# Patient Record
Sex: MALE | Race: WHITE | Employment: FULL TIME | URBAN - METROPOLITAN AREA
[De-identification: names, ages, dates, MRNs, and addresses within clinical notes are randomized per-mention and may not be internally consistent; named-entity substitution may affect disease eponyms.]

---

## 2019-11-05 ENCOUNTER — OFFICE VISIT (OUTPATIENT)
Dept: FAMILY MEDICINE CLINIC | Age: 54
End: 2019-11-05
Payer: COMMERCIAL

## 2019-11-05 VITALS
OXYGEN SATURATION: 98 % | WEIGHT: 232 LBS | SYSTOLIC BLOOD PRESSURE: 130 MMHG | BODY MASS INDEX: 30.75 KG/M2 | DIASTOLIC BLOOD PRESSURE: 70 MMHG | HEART RATE: 76 BPM | HEIGHT: 73 IN | RESPIRATION RATE: 18 BRPM

## 2019-11-05 DIAGNOSIS — Z23 NEED FOR SHINGLES VACCINE: ICD-10-CM

## 2019-11-05 DIAGNOSIS — R53.83 FATIGUE, UNSPECIFIED TYPE: Primary | ICD-10-CM

## 2019-11-05 DIAGNOSIS — R35.0 FREQUENT URINATION: ICD-10-CM

## 2019-11-05 PROCEDURE — 99213 OFFICE O/P EST LOW 20 MIN: CPT | Performed by: FAMILY MEDICINE

## 2019-11-05 PROCEDURE — 81003 URINALYSIS AUTO W/O SCOPE: CPT | Performed by: FAMILY MEDICINE

## 2019-11-05 ASSESSMENT — PATIENT HEALTH QUESTIONNAIRE - PHQ9
2. FEELING DOWN, DEPRESSED OR HOPELESS: 0
SUM OF ALL RESPONSES TO PHQ QUESTIONS 1-9: 0
1. LITTLE INTEREST OR PLEASURE IN DOING THINGS: 0
SUM OF ALL RESPONSES TO PHQ9 QUESTIONS 1 & 2: 0
SUM OF ALL RESPONSES TO PHQ QUESTIONS 1-9: 0

## 2019-11-06 ASSESSMENT — ENCOUNTER SYMPTOMS
SINUS PRESSURE: 0
RHINORRHEA: 0
VOICE CHANGE: 0
EYE REDNESS: 0
COUGH: 0
STRIDOR: 0
EYE PAIN: 0
COLOR CHANGE: 0
SORE THROAT: 0
CHEST TIGHTNESS: 0
ANAL BLEEDING: 0
PHOTOPHOBIA: 0
EYE DISCHARGE: 0
EYE ITCHING: 0
CHOKING: 0
DIARRHEA: 0
RECTAL PAIN: 0
VOMITING: 0
WHEEZING: 0
BLOOD IN STOOL: 0
ABDOMINAL DISTENTION: 0
FACIAL SWELLING: 0
TROUBLE SWALLOWING: 0
APNEA: 0
NAUSEA: 0
BACK PAIN: 0
CONSTIPATION: 0
SHORTNESS OF BREATH: 0
ABDOMINAL PAIN: 0

## 2019-12-20 ENCOUNTER — APPOINTMENT (OUTPATIENT)
Dept: ULTRASOUND IMAGING | Age: 54
End: 2019-12-20
Payer: COMMERCIAL

## 2019-12-20 ENCOUNTER — HOSPITAL ENCOUNTER (EMERGENCY)
Age: 54
Discharge: HOME | End: 2019-12-20
Attending: NURSE PRACTITIONER
Payer: COMMERCIAL

## 2019-12-20 VITALS
HEART RATE: 92 BPM | OXYGEN SATURATION: 98 % | DIASTOLIC BLOOD PRESSURE: 85 MMHG | BODY MASS INDEX: 30.48 KG/M2 | RESPIRATION RATE: 18 BRPM | WEIGHT: 230 LBS | SYSTOLIC BLOOD PRESSURE: 136 MMHG | HEIGHT: 73 IN | TEMPERATURE: 97.9 F

## 2019-12-20 DIAGNOSIS — M79.661 RIGHT CALF PAIN: Primary | ICD-10-CM

## 2019-12-20 PROCEDURE — 99212 OFFICE O/P EST SF 10 MIN: CPT

## 2019-12-20 PROCEDURE — 93971 EXTREMITY STUDY: CPT

## 2019-12-20 ASSESSMENT — PAIN DESCRIPTION - ONSET: ONSET: GRADUAL

## 2019-12-20 ASSESSMENT — PAIN SCALES - GENERAL: PAINLEVEL_OUTOF10: 7

## 2019-12-20 ASSESSMENT — PAIN DESCRIPTION - FREQUENCY: FREQUENCY: CONTINUOUS

## 2019-12-20 ASSESSMENT — PAIN DESCRIPTION - LOCATION: LOCATION: LEG

## 2019-12-20 ASSESSMENT — PAIN DESCRIPTION - ORIENTATION: ORIENTATION: RIGHT

## 2019-12-20 ASSESSMENT — PAIN DESCRIPTION - PAIN TYPE: TYPE: ACUTE PAIN

## 2019-12-20 ASSESSMENT — PAIN DESCRIPTION - PROGRESSION: CLINICAL_PROGRESSION: GRADUALLY WORSENING

## 2019-12-20 ASSESSMENT — PAIN DESCRIPTION - DESCRIPTORS: DESCRIPTORS: ACHING;SORE;RADIATING

## 2020-02-03 ENCOUNTER — HOSPITAL ENCOUNTER (OUTPATIENT)
Age: 55
Discharge: HOME | End: 2020-02-05
Payer: COMMERCIAL

## 2020-02-03 LAB
ALBUMIN SERPL-MCNC: 4.2 G/DL (ref 3.5–5.2)
ALP BLD-CCNC: 73 U/L (ref 40–129)
ALT SERPL-CCNC: 23 U/L (ref 0–40)
ANION GAP SERPL CALCULATED.3IONS-SCNC: 17 MMOL/L (ref 7–16)
AST SERPL-CCNC: 24 U/L (ref 0–39)
BASOPHILS ABSOLUTE: 0.07 E9/L (ref 0–0.2)
BASOPHILS RELATIVE PERCENT: 1.1 % (ref 0–2)
BILIRUB SERPL-MCNC: 1.1 MG/DL (ref 0–1.2)
BILIRUBIN URINE: NEGATIVE
BLOOD, URINE: NEGATIVE
BUN BLDV-MCNC: 16 MG/DL (ref 6–20)
CALCIUM SERPL-MCNC: 9.5 MG/DL (ref 8.6–10.2)
CHLORIDE BLD-SCNC: 108 MMOL/L (ref 98–107)
CHOLESTEROL, TOTAL: 171 MG/DL (ref 0–199)
CLARITY: CLEAR
CO2: 19 MMOL/L (ref 22–29)
COLOR: YELLOW
CREAT SERPL-MCNC: 1 MG/DL (ref 0.7–1.2)
EOSINOPHILS ABSOLUTE: 0.22 E9/L (ref 0.05–0.5)
EOSINOPHILS RELATIVE PERCENT: 3.5 % (ref 0–6)
GFR AFRICAN AMERICAN: >60
GFR NON-AFRICAN AMERICAN: >60 ML/MIN/1.73
GLUCOSE BLD-MCNC: 96 MG/DL (ref 74–99)
GLUCOSE URINE: NEGATIVE MG/DL
HBA1C MFR BLD: 6.2 % (ref 4–5.6)
HCT VFR BLD CALC: 47 % (ref 37–54)
HDLC SERPL-MCNC: 39 MG/DL
HEMOGLOBIN: 14.6 G/DL (ref 12.5–16.5)
IMMATURE GRANULOCYTES #: 0.02 E9/L
IMMATURE GRANULOCYTES %: 0.3 % (ref 0–5)
KETONES, URINE: NEGATIVE MG/DL
LDL CHOLESTEROL CALCULATED: 116 MG/DL (ref 0–99)
LEUKOCYTE ESTERASE, URINE: NEGATIVE
LYMPHOCYTES ABSOLUTE: 1.86 E9/L (ref 1.5–4)
LYMPHOCYTES RELATIVE PERCENT: 29.2 % (ref 20–42)
MCH RBC QN AUTO: 26.7 PG (ref 26–35)
MCHC RBC AUTO-ENTMCNC: 31.1 % (ref 32–34.5)
MCV RBC AUTO: 86.1 FL (ref 80–99.9)
MONOCYTES ABSOLUTE: 0.69 E9/L (ref 0.1–0.95)
MONOCYTES RELATIVE PERCENT: 10.8 % (ref 2–12)
NEUTROPHILS ABSOLUTE: 3.5 E9/L (ref 1.8–7.3)
NEUTROPHILS RELATIVE PERCENT: 55.1 % (ref 43–80)
NITRITE, URINE: NEGATIVE
PDW BLD-RTO: 13.2 FL (ref 11.5–15)
PH UA: 7 (ref 5–9)
PLATELET # BLD: 325 E9/L (ref 130–450)
PMV BLD AUTO: 10.7 FL (ref 7–12)
POTASSIUM SERPL-SCNC: 4.4 MMOL/L (ref 3.5–5)
PROSTATE SPECIFIC ANTIGEN: 4.47 NG/ML (ref 0–4)
PROTEIN UA: NEGATIVE MG/DL
RBC # BLD: 5.46 E12/L (ref 3.8–5.8)
RHEUMATOID FACTOR: <10 IU/ML (ref 0–13)
SODIUM BLD-SCNC: 144 MMOL/L (ref 132–146)
SPECIFIC GRAVITY UA: 1.02 (ref 1–1.03)
T4 FREE: 1.08 NG/DL (ref 0.93–1.7)
TOTAL PROTEIN: 7.4 G/DL (ref 6.4–8.3)
TRIGL SERPL-MCNC: 81 MG/DL (ref 0–149)
TSH SERPL DL<=0.05 MIU/L-ACNC: 1.52 UIU/ML (ref 0.27–4.2)
UROBILINOGEN, URINE: 0.2 E.U./DL
VLDLC SERPL CALC-MCNC: 16 MG/DL
WBC # BLD: 6.4 E9/L (ref 4.5–11.5)

## 2020-02-03 PROCEDURE — 36415 COLL VENOUS BLD VENIPUNCTURE: CPT

## 2020-02-03 PROCEDURE — 86431 RHEUMATOID FACTOR QUANT: CPT

## 2020-02-03 PROCEDURE — 84439 ASSAY OF FREE THYROXINE: CPT

## 2020-02-03 PROCEDURE — 84443 ASSAY THYROID STIM HORMONE: CPT

## 2020-02-03 PROCEDURE — 80053 COMPREHEN METABOLIC PANEL: CPT

## 2020-02-03 PROCEDURE — 86803 HEPATITIS C AB TEST: CPT

## 2020-02-03 PROCEDURE — 81003 URINALYSIS AUTO W/O SCOPE: CPT

## 2020-02-03 PROCEDURE — G0103 PSA SCREENING: HCPCS

## 2020-02-03 PROCEDURE — 86200 CCP ANTIBODY: CPT

## 2020-02-03 PROCEDURE — 80061 LIPID PANEL: CPT

## 2020-02-03 PROCEDURE — 83036 HEMOGLOBIN GLYCOSYLATED A1C: CPT

## 2020-02-03 PROCEDURE — 85025 COMPLETE CBC W/AUTO DIFF WBC: CPT

## 2020-02-04 LAB — HEPATITIS C ANTIBODY INTERPRETATION: NORMAL

## 2020-02-05 ENCOUNTER — OFFICE VISIT (OUTPATIENT)
Dept: FAMILY MEDICINE CLINIC | Age: 55
End: 2020-02-05
Payer: COMMERCIAL

## 2020-02-05 VITALS
SYSTOLIC BLOOD PRESSURE: 120 MMHG | HEART RATE: 60 BPM | BODY MASS INDEX: 30.88 KG/M2 | RESPIRATION RATE: 18 BRPM | WEIGHT: 233 LBS | DIASTOLIC BLOOD PRESSURE: 78 MMHG | HEIGHT: 73 IN | OXYGEN SATURATION: 97 %

## 2020-02-05 PROCEDURE — 99213 OFFICE O/P EST LOW 20 MIN: CPT | Performed by: FAMILY MEDICINE

## 2020-02-05 RX ORDER — FINASTERIDE 5 MG/1
5 TABLET, FILM COATED ORAL DAILY
Qty: 30 TABLET | Refills: 5 | Status: SHIPPED
Start: 2020-02-05 | End: 2020-09-05 | Stop reason: SDUPTHER

## 2020-02-05 RX ORDER — MONTELUKAST SODIUM 10 MG/1
10 TABLET ORAL NIGHTLY
Qty: 30 TABLET | Refills: 5 | Status: SHIPPED
Start: 2020-02-05 | End: 2020-10-13 | Stop reason: SDUPTHER

## 2020-02-05 ASSESSMENT — PATIENT HEALTH QUESTIONNAIRE - PHQ9
SUM OF ALL RESPONSES TO PHQ9 QUESTIONS 1 & 2: 0
1. LITTLE INTEREST OR PLEASURE IN DOING THINGS: 0
2. FEELING DOWN, DEPRESSED OR HOPELESS: 0
SUM OF ALL RESPONSES TO PHQ QUESTIONS 1-9: 0
SUM OF ALL RESPONSES TO PHQ QUESTIONS 1-9: 0

## 2020-02-06 LAB — CCP IGG ANTIBODIES: 6 UNITS (ref 0–19)

## 2020-02-07 ASSESSMENT — ENCOUNTER SYMPTOMS
BACK PAIN: 0
NAUSEA: 0
RHINORRHEA: 1
VOICE CHANGE: 0
SHORTNESS OF BREATH: 0
PHOTOPHOBIA: 0
ABDOMINAL PAIN: 1
CONSTIPATION: 0
COLOR CHANGE: 0
WHEEZING: 0
RECTAL PAIN: 0
CHOKING: 0
ABDOMINAL DISTENTION: 0
ANAL BLEEDING: 0
SORE THROAT: 0
STRIDOR: 0
TROUBLE SWALLOWING: 0
APNEA: 0
VOMITING: 0
CHEST TIGHTNESS: 0
SINUS PRESSURE: 1
EYE ITCHING: 0
EYE REDNESS: 0
COUGH: 1
FACIAL SWELLING: 0
EYE PAIN: 0
EYE DISCHARGE: 0
BLOOD IN STOOL: 0
SINUS PAIN: 1
DIARRHEA: 0

## 2020-02-07 NOTE — PROGRESS NOTES
Occupational History    Not on file   Social Needs    Financial resource strain: Not on file    Food insecurity:     Worry: Not on file     Inability: Not on file    Transportation needs:     Medical: Not on file     Non-medical: Not on file   Tobacco Use    Smoking status: Never Smoker    Smokeless tobacco: Never Used   Substance and Sexual Activity    Alcohol use: No     Comment: beer once a year    Drug use: No    Sexual activity: Not on file   Lifestyle    Physical activity:     Days per week: Not on file     Minutes per session: Not on file    Stress: Not on file   Relationships    Social connections:     Talks on phone: Not on file     Gets together: Not on file     Attends Sikhism service: Not on file     Active member of club or organization: Not on file     Attends meetings of clubs or organizations: Not on file     Relationship status: Not on file    Intimate partner violence:     Fear of current or ex partner: Not on file     Emotionally abused: Not on file     Physically abused: Not on file     Forced sexual activity: Not on file   Other Topics Concern    Not on file   Social History Narrative    Not on file       No family history on file. No current outpatient medications on file prior to visit. No current facility-administered medications on file prior to visit. No Known Allergies    I have reviewed his allergies, medications, problem list, medical,social and family history and have updated as needed in the electronic medical record. Objective:     Physical Exam  Vitals signs and nursing note reviewed. Constitutional:       General: He is not in acute distress. Appearance: He is well-developed. He is not diaphoretic. HENT:      Head: Normocephalic and atraumatic. Right Ear: External ear normal.      Left Ear: External ear normal.      Nose: Nose normal.      Mouth/Throat:      Pharynx: No oropharyngeal exudate. Eyes:      General: No scleral icterus. Right eye: No discharge. Left eye: No discharge. Conjunctiva/sclera: Conjunctivae normal.      Pupils: Pupils are equal, round, and reactive to light. Neck:      Musculoskeletal: Normal range of motion and neck supple. Thyroid: No thyromegaly. Vascular: No JVD. Trachea: No tracheal deviation. Cardiovascular:      Rate and Rhythm: Normal rate and regular rhythm. Heart sounds: Normal heart sounds. No murmur. No friction rub. No gallop. Pulmonary:      Effort: Pulmonary effort is normal. No respiratory distress. Breath sounds: Normal breath sounds. No stridor. No wheezing or rales. Chest:      Chest wall: No tenderness. Abdominal:      General: Bowel sounds are normal. There is no distension. Palpations: Abdomen is soft. There is no mass. Tenderness: There is no abdominal tenderness. There is no guarding or rebound. Genitourinary:     Comments: Deferred by patient  Musculoskeletal: Normal range of motion. General: No tenderness. Lymphadenopathy:      Cervical: No cervical adenopathy. Skin:     General: Skin is warm and dry. Coloration: Skin is not pale. Findings: No erythema or rash. Neurological:      Mental Status: He is alert and oriented to person, place, and time. Cranial Nerves: No cranial nerve deficit. Motor: No abnormal muscle tone. Coordination: Coordination normal.      Deep Tendon Reflexes: Reflexes are normal and symmetric. Reflexes normal.   Psychiatric:         Behavior: Behavior normal.         Thought Content: Thought content normal.         Judgment: Judgment normal.         Assessment / Plan:   Fede Garber was seen today for 3 month follow-up. Diagnoses and all orders for this visit:    Elevated PSA  -     External Referral To Urology  -     finasteride (PROSCAR) 5 MG tablet;  Take 1 tablet by mouth daily    IFG (impaired fasting glucose)  -     Hemoglobin A1C; Future    Seasonal allergic rhinitis

## 2020-05-06 ENCOUNTER — HOSPITAL ENCOUNTER (OUTPATIENT)
Age: 55
Discharge: HOME | End: 2020-05-08
Payer: COMMERCIAL

## 2020-05-06 LAB — HBA1C MFR BLD: 6 % (ref 4–5.6)

## 2020-05-06 PROCEDURE — 36415 COLL VENOUS BLD VENIPUNCTURE: CPT

## 2020-05-06 PROCEDURE — 86003 ALLG SPEC IGE CRUDE XTRC EA: CPT

## 2020-05-06 PROCEDURE — 82785 ASSAY OF IGE: CPT

## 2020-05-06 PROCEDURE — 83036 HEMOGLOBIN GLYCOSYLATED A1C: CPT

## 2020-05-11 LAB
Lab: NORMAL
REPORT: NORMAL
THIS TEST SENT TO: NORMAL

## 2020-07-09 ENCOUNTER — HOSPITAL ENCOUNTER (OUTPATIENT)
Age: 55
Discharge: HOME | End: 2020-07-11
Payer: COMMERCIAL

## 2020-07-09 LAB
CALCIUM SERPL-MCNC: 9.4 MG/DL (ref 8.6–10.2)
PHOSPHORUS: 2.3 MG/DL (ref 2.5–4.5)
URIC ACID, SERUM: 5.2 MG/DL (ref 3.4–7)

## 2020-07-09 PROCEDURE — 84550 ASSAY OF BLOOD/URIC ACID: CPT

## 2020-07-09 PROCEDURE — 84153 ASSAY OF PSA TOTAL: CPT

## 2020-07-09 PROCEDURE — 84100 ASSAY OF PHOSPHORUS: CPT

## 2020-07-09 PROCEDURE — 36415 COLL VENOUS BLD VENIPUNCTURE: CPT

## 2020-07-09 PROCEDURE — 82310 ASSAY OF CALCIUM: CPT

## 2020-07-09 PROCEDURE — G0103 PSA SCREENING: HCPCS

## 2020-07-16 LAB
PROSTATE SPECIFIC ANTIGEN: 4.28 NG/ML (ref 0–4)
PROSTATE SPECIFIC ANTIGEN: ABNORMAL NG/ML (ref 0–4)

## 2020-09-08 RX ORDER — FINASTERIDE 5 MG/1
5 TABLET, FILM COATED ORAL DAILY
Qty: 30 TABLET | Refills: 5 | Status: SHIPPED
Start: 2020-09-08 | End: 2021-03-28 | Stop reason: SDUPTHER

## 2020-09-09 ENCOUNTER — TELEPHONE (OUTPATIENT)
Dept: ADMINISTRATIVE | Age: 55
End: 2020-09-09

## 2020-09-09 NOTE — TELEPHONE ENCOUNTER
Called and spoke with wife and told her that Dr. Eusebio Gallegos advises ER, may be cardiac related. She said she will call her  and tell him. She had additional questions about pt not going to ER if he is no longer having any problems. Informed her that encounter would be sent back to the office. Please have a staff member call pt's wife as we are unable to answer any clinical questions.

## 2020-09-09 NOTE — TELEPHONE ENCOUNTER
Pt's wife called and requested to schedule an appt, no availability this week or next week. Pt is having abdominal discomfort under his sternum x 1 week. Most times it is a dull ache, but sometimes is a strong pain. It is not constant, but he feels pressure. Please contact pt's wife with further recommendations.

## 2020-09-10 NOTE — TELEPHONE ENCOUNTER
Per Dr. Janel Paulino patient has to be evaluated by emergency room and if having no problems now then needs to schedule next available to discuss with Dr. Janel Paulino

## 2020-10-06 ENCOUNTER — HOSPITAL ENCOUNTER (OUTPATIENT)
Age: 55
Discharge: HOME | End: 2020-10-08
Payer: COMMERCIAL

## 2020-10-06 LAB — PROSTATE SPECIFIC ANTIGEN: 3.66 NG/ML (ref 0–4)

## 2020-10-06 PROCEDURE — 84153 ASSAY OF PSA TOTAL: CPT

## 2020-10-06 PROCEDURE — 36415 COLL VENOUS BLD VENIPUNCTURE: CPT

## 2020-10-13 ENCOUNTER — OFFICE VISIT (OUTPATIENT)
Dept: FAMILY MEDICINE CLINIC | Age: 55
End: 2020-10-13
Payer: COMMERCIAL

## 2020-10-13 VITALS
BODY MASS INDEX: 30.22 KG/M2 | SYSTOLIC BLOOD PRESSURE: 126 MMHG | OXYGEN SATURATION: 99 % | WEIGHT: 228 LBS | HEIGHT: 73 IN | DIASTOLIC BLOOD PRESSURE: 80 MMHG | RESPIRATION RATE: 18 BRPM | HEART RATE: 56 BPM

## 2020-10-13 PROCEDURE — 99213 OFFICE O/P EST LOW 20 MIN: CPT | Performed by: FAMILY MEDICINE

## 2020-10-13 PROCEDURE — 90686 IIV4 VACC NO PRSV 0.5 ML IM: CPT | Performed by: FAMILY MEDICINE

## 2020-10-13 PROCEDURE — 90471 IMMUNIZATION ADMIN: CPT | Performed by: FAMILY MEDICINE

## 2020-10-13 RX ORDER — MONTELUKAST SODIUM 10 MG/1
10 TABLET ORAL NIGHTLY
Qty: 30 TABLET | Refills: 5 | Status: SHIPPED
Start: 2020-10-13 | End: 2021-04-22 | Stop reason: SINTOL

## 2020-10-17 ASSESSMENT — ENCOUNTER SYMPTOMS
DIARRHEA: 0
RHINORRHEA: 0
EYE DISCHARGE: 0
WHEEZING: 0
APNEA: 0
STRIDOR: 0
TROUBLE SWALLOWING: 0
COLOR CHANGE: 0
VOICE CHANGE: 0
ABDOMINAL PAIN: 0
VOMITING: 0
RECTAL PAIN: 0
SINUS COMPLAINT: 1
BACK PAIN: 0
EYE REDNESS: 0
CHOKING: 0
CHEST TIGHTNESS: 0
PHOTOPHOBIA: 0
EYE PAIN: 0
SORE THROAT: 0
EYE ITCHING: 0
ABDOMINAL DISTENTION: 0
FACIAL SWELLING: 0
NAUSEA: 0
COUGH: 0
BLOOD IN STOOL: 0
SHORTNESS OF BREATH: 0
SINUS PRESSURE: 0
ANAL BLEEDING: 0
CONSTIPATION: 0

## 2020-10-18 NOTE — PROGRESS NOTES
Valorie Martinez is a 63 y.o. male  . Subjective:      Shoulder Pain    The pain is present in the right shoulder. This is a recurrent problem. The current episode started more than 1 month ago. The problem occurs daily. The problem has been gradually worsening. The quality of the pain is described as aching and burning. The pain is at a severity of 5/10. The pain is moderate. Pertinent negatives include no fever or numbness. He has tried NSAIDS for the symptoms. The treatment provided mild relief. His past medical history is significant for osteoarthritis. Hand Pain    The incident occurred more than 1 week ago (left trigger finger , ring). There was no injury mechanism. The pain is present in the left fingers and left hand. The quality of the pain is described as aching and burning. The pain does not radiate. The pain is at a severity of 2/10. The pain is mild. The pain has been intermittent since the incident. Pertinent negatives include no chest pain or numbness. Nothing aggravates the symptoms. He has tried nothing for the symptoms. The treatment provided no relief. Sinus Problem   This is a recurrent (seasonal allergic rhinitis) problem. The current episode started more than 1 month ago. The problem has been waxing and waning since onset. Pertinent negatives include no chills, congestion, coughing, diaphoresis, ear pain, headaches, neck pain, shortness of breath, sinus pressure, sneezing or sore throat. Past treatments include nothing. The treatment provided no relief. Review of Systems   Constitutional: Negative for activity change, appetite change, chills, diaphoresis, fatigue, fever and unexpected weight change. HENT: Negative for congestion, dental problem, drooling, ear discharge, ear pain, facial swelling, hearing loss, mouth sores, nosebleeds, postnasal drip, rhinorrhea, sinus pressure, sneezing, sore throat, tinnitus, trouble swallowing and voice change.     Eyes: Negative for photophobia, pain, discharge, redness, itching and visual disturbance. Respiratory: Negative for apnea, cough, choking, chest tightness, shortness of breath, wheezing and stridor. Cardiovascular: Negative for chest pain, palpitations and leg swelling. Gastrointestinal: Negative for abdominal distention, abdominal pain, anal bleeding, blood in stool, constipation, diarrhea, nausea, rectal pain and vomiting. Endocrine: Negative for cold intolerance, heat intolerance, polydipsia, polyphagia and polyuria. Genitourinary: Negative for decreased urine volume, difficulty urinating, discharge, dysuria, enuresis, flank pain, frequency, genital sores, hematuria, penile pain, penile swelling, scrotal swelling, testicular pain and urgency. Musculoskeletal: Positive for arthralgias and myalgias. Negative for back pain, gait problem, joint swelling, neck pain and neck stiffness. Skin: Negative for color change, pallor, rash and wound. Allergic/Immunologic: Negative for environmental allergies, food allergies and immunocompromised state. Neurological: Negative for dizziness, tremors, seizures, syncope, facial asymmetry, speech difficulty, weakness, light-headedness, numbness and headaches. Hematological: Negative for adenopathy. Does not bruise/bleed easily. Psychiatric/Behavioral: Negative for agitation, behavioral problems, confusion, decreased concentration, dysphoric mood, hallucinations, self-injury, sleep disturbance and suicidal ideas. The patient is not nervous/anxious and is not hyperactive. No past medical history on file.     Social History     Socioeconomic History    Marital status:      Spouse name: Not on file    Number of children: Not on file    Years of education: Not on file    Highest education level: Not on file   Occupational History    Not on file   Social Needs    Financial resource strain: Not on file    Food insecurity     Worry: Not on file     Inability: Not on file   Annalee Lopez Transportation needs     Medical: Not on file     Non-medical: Not on file   Tobacco Use    Smoking status: Never Smoker    Smokeless tobacco: Never Used   Substance and Sexual Activity    Alcohol use: No     Comment: beer once a year    Drug use: No    Sexual activity: Not on file   Lifestyle    Physical activity     Days per week: Not on file     Minutes per session: Not on file    Stress: Not on file   Relationships    Social connections     Talks on phone: Not on file     Gets together: Not on file     Attends Uatsdin service: Not on file     Active member of club or organization: Not on file     Attends meetings of clubs or organizations: Not on file     Relationship status: Not on file    Intimate partner violence     Fear of current or ex partner: Not on file     Emotionally abused: Not on file     Physically abused: Not on file     Forced sexual activity: Not on file   Other Topics Concern    Not on file   Social History Narrative    Not on file       No family history on file. Current Outpatient Medications on File Prior to Visit   Medication Sig Dispense Refill    finasteride (PROSCAR) 5 MG tablet Take 1 tablet by mouth daily 30 tablet 5     No current facility-administered medications on file prior to visit. No Known Allergies    I have reviewed his allergies, medications, problem list, medical,social and family history and have updated as needed in the electronic medical record. Objective:     Physical Exam  Vitals signs and nursing note reviewed. Constitutional:       General: He is not in acute distress. Appearance: He is well-developed. He is not diaphoretic. HENT:      Head: Normocephalic and atraumatic. Right Ear: External ear normal.      Left Ear: External ear normal.      Nose: Nose normal.      Mouth/Throat:      Pharynx: No oropharyngeal exudate. Eyes:      General: No scleral icterus. Right eye: No discharge. Left eye: No discharge. MICROALBUMIN, UR; Future    Seasonal allergic rhinitis due to pollen  -     montelukast (SINGULAIR) 10 MG tablet; Take 1 tablet by mouth nightly  -     CBC Auto Differential; Future  -     Comprehensive Metabolic Panel; Future  -     TSH without Reflex; Future  -     Hemoglobin A1C; Future  -     Lipid Panel; Future  -     MICROALBUMIN, UR; Future    IFG (impaired fasting glucose)  -     CBC Auto Differential; Future  -     Comprehensive Metabolic Panel; Future  -     TSH without Reflex; Future  -     Hemoglobin A1C; Future  -     Lipid Panel; Future  -     MICROALBUMIN, UR; Future    Mixed hyperlipidemia  -     CBC Auto Differential; Future  -     Comprehensive Metabolic Panel; Future  -     TSH without Reflex; Future  -     Hemoglobin A1C; Future  -     Lipid Panel; Future  -     MICROALBUMIN, UR; Future    Trigger finger, left ring finger  -     Fernanda Mancuso DO, [facility] [place]    Chronic right shoulder pain  -     Nino Davila, DO, [facility], [place]    Need for influenza vaccination  -     INFLUENZA, QUADV, 3 YRS AND OLDER, IM PF, PREFILL SYR OR SDV, 0.5ML (AFLURIA QUADV, PF)        Reviewed healthmaintenance report. Patient is aware of deficiencies and suggested preventative tests.

## 2021-02-12 DIAGNOSIS — M65.342 TRIGGER FINGER, LEFT RING FINGER: Primary | ICD-10-CM

## 2021-02-27 DIAGNOSIS — M72.2 PLANTAR FASCIITIS: Primary | ICD-10-CM

## 2021-03-04 ENCOUNTER — OFFICE VISIT (OUTPATIENT)
Dept: PODIATRY | Age: 56
End: 2021-03-04
Payer: COMMERCIAL

## 2021-03-04 VITALS — BODY MASS INDEX: 30.48 KG/M2 | HEIGHT: 73 IN | WEIGHT: 230 LBS | RESPIRATION RATE: 18 BRPM

## 2021-03-04 DIAGNOSIS — R26.2 DIFFICULTY WALKING: ICD-10-CM

## 2021-03-04 DIAGNOSIS — M79.672 PAIN IN BOTH FEET: ICD-10-CM

## 2021-03-04 DIAGNOSIS — M79.671 PAIN IN BOTH FEET: ICD-10-CM

## 2021-03-04 DIAGNOSIS — M72.2 PLANTAR FASCIITIS: Primary | ICD-10-CM

## 2021-03-04 PROCEDURE — 99243 OFF/OP CNSLTJ NEW/EST LOW 30: CPT | Performed by: PODIATRIST

## 2021-03-04 NOTE — LETTER
[address] [place] [address] [place]   Phone: [phone]  Fax: [phone]    Kayleigh [place] Christy  <[id number]>   1965  3/5/2021      Dear Sonia Fay,    I would like to thank you for the kind referral of Ebenezer Christy. He presented to the office today for evaluation regarding plantar fascial issues to both lower extremities. We did discuss multiple conservative treatment options with patient in detail today for care. We did discuss shoe gear recommendations, appropriate OTC insoles to purchase, and daily stretching and strengthening exercises. We will have continued follow-up until issues are improved. If you should have any questions concerning his visit today, please do not hesitate to contact me.     Sincerely,    Adilia Harrington DPM

## 2021-03-04 NOTE — PROGRESS NOTES
Patient is here for bilateral plantar fasciitis. More so on left. Patient was referred by Jessy Dye DO. Patient got orthotics insoles and slippers. Patient also rolled feet on water bottles. Patient has a split on left great toe. Would like it checked out.      LOV  Jessy Dye DO was 6 months ago         Electronically signed by Carie Vidal LPN on 1/4/8179 at 76:49 AM

## 2021-03-05 PROBLEM — M79.671 PAIN IN BOTH FEET: Status: ACTIVE | Noted: 2021-03-05

## 2021-03-05 PROBLEM — M79.672 PAIN IN BOTH FEET: Status: ACTIVE | Noted: 2021-03-05

## 2021-03-05 PROBLEM — M72.2 PLANTAR FASCIITIS: Status: ACTIVE | Noted: 2021-03-05

## 2021-03-05 PROBLEM — R26.2 DIFFICULTY WALKING: Status: ACTIVE | Noted: 2021-03-05

## 2021-03-05 NOTE — PROGRESS NOTES
3/5/21     Lili Powell    : 1965 Sex: male   Age: 63 y.o. Patient was referred by: Terrance Dobbs DO  Patient's PCP/Provider is:  Terrance Dobbs DO    Subjective:    Patient is seen today for evaluation regarding bilateral heel and arch pain. Chief Complaint   Patient presents with    Other     plantar fasciitis     Nail Problem       HPI: Patient stated that the issues have been going on for several months and have progressively getting worse. He has difficulty upon awakening in the morning and after periods of rest starting to walk comfortably. Patient has tried some store-bought insoles which have not helped his current symptoms. He denies any recent injuries or changes in activities. No other additional abnormalities noted at this time. ROS:  Const: Positives and pertinent negatives as per HPI. Musculo: Denies symptoms other than stated above. Neuro: Denies symptoms other than stated above. Skin: Denies symptoms other than stated above. Current Medications:    Current Outpatient Medications:     montelukast (SINGULAIR) 10 MG tablet, Take 1 tablet by mouth nightly, Disp: 30 tablet, Rfl: 5    finasteride (PROSCAR) 5 MG tablet, Take 1 tablet by mouth daily, Disp: 30 tablet, Rfl: 5    Allergies:  No Known Allergies    Vitals:    21 1127   Resp: 18   Weight: 230 lb (104.3 kg)   Height: 6' 1\" (1.854 m)        No past medical history on file. No family history on file. Past Surgical History:   Procedure Laterality Date    CYST REMOVAL Left     as kid ganglion    HERNIA REPAIR      TONSILLECTOMY       Social History     Tobacco Use    Smoking status: Never Smoker    Smokeless tobacco: Never Used   Substance Use Topics    Alcohol use: No     Comment: beer once a year    Drug use: No           Diagnostic studies:    No results found. Procedures:    None    Exam:  VASCULAR: Pedal pulses palpable bilateral foot.   Capillary fill time brisk digits 1 through 5 bilateral No acute transfusion indication and no active bleeding  Monitor daily CBC and cont folic acid  Conservative transfusion protocol   foot.  NEUROLOGICAL: Epicritic sensations intact and symmetrical.  No paresthesias noted upon percussion tarsal tunnel region bilateral foot. DERMATOLOGICAL: No edema or ecchymotic skin changes present present plantar aspect right heel and arch region. MUSCULOSKELETAL: Tenderness noted to palpation into the plantar heel and arch regions to palpation left greater than right. Mild antalgic gait noted upon evaluation. Plan Per Assessment  Beth Matos was seen today for other and nail problem. Diagnoses and all orders for this visit:    Plantar fasciitis  -     Amb External Referral For Orthotics    Pain in both feet    Difficulty walking        1. New patient consultation and management  2. We did review previous x-ray studies and visit notes prior to patient's appointment today. No acute bone abnormalities noted. 3. We did recommend purchasing OTC insoles which were recommended for patient to purchase. We did discuss shoe gear options for both work and at home to accommodate the insoles and reduce current issues. Appropriate stretching and strengthening exercises were discussed in detail today as well. 4. Patient will be followed up in 1 month's time or sooner if needed for continued evaluation and management. He was advised to call the office with any questions or concerns in the interim. Seen By:    Marlyn Barraza DPM    Electronically signed by Marlyn Barraza DPM on 3/5/2021 at 7:44 AM      This note was created using voice recognition software. The note was reviewed however may contain grammatical errors.

## 2021-03-08 ENCOUNTER — OFFICE VISIT (OUTPATIENT)
Dept: ORTHOPEDIC SURGERY | Age: 56
End: 2021-03-08
Payer: COMMERCIAL

## 2021-03-08 VITALS — WEIGHT: 230 LBS | HEIGHT: 73 IN | BODY MASS INDEX: 30.48 KG/M2

## 2021-03-08 DIAGNOSIS — M25.511 RIGHT SHOULDER PAIN, UNSPECIFIED CHRONICITY: Primary | ICD-10-CM

## 2021-03-08 DIAGNOSIS — M65.342 TRIGGER FINGER, LEFT RING FINGER: ICD-10-CM

## 2021-03-08 DIAGNOSIS — M75.41 SHOULDER IMPINGEMENT, RIGHT: ICD-10-CM

## 2021-03-08 PROCEDURE — 99204 OFFICE O/P NEW MOD 45 MIN: CPT | Performed by: ORTHOPAEDIC SURGERY

## 2021-03-08 NOTE — PROGRESS NOTES
Chief Complaint   Patient presents with    Shoulder Pain     Right shoulder pain from a fall about a year ago.  Hand Pain     Left hand ring triggering for the past year. Panfilo Lopez returns today for follow up of his right shoulder pain. he reports that the pain in the shoulder is worse. He reports an injury when he fell about a year ago. he has not been going to physical therapy. he is also complaining of left hand trigger finger in the ring finger. The patient is right dominant. The patient's pain level is a 5/10. The patient did respond to treatment. No past medical history on file.   Past Surgical History:   Procedure Laterality Date    CYST REMOVAL Left     as kid ganglion    HERNIA REPAIR      TONSILLECTOMY         Current Outpatient Medications:     montelukast (SINGULAIR) 10 MG tablet, Take 1 tablet by mouth nightly, Disp: 30 tablet, Rfl: 5    finasteride (PROSCAR) 5 MG tablet, Take 1 tablet by mouth daily, Disp: 30 tablet, Rfl: 5  No Known Allergies  Social History     Socioeconomic History    Marital status:      Spouse name: Not on file    Number of children: Not on file    Years of education: Not on file    Highest education level: Not on file   Occupational History    Not on file   Social Needs    Financial resource strain: Not on file    Food insecurity     Worry: Not on file     Inability: Not on file    Transportation needs     Medical: Not on file     Non-medical: Not on file   Tobacco Use    Smoking status: Never Smoker    Smokeless tobacco: Never Used   Substance and Sexual Activity    Alcohol use: No     Comment: beer once a year    Drug use: No    Sexual activity: Not on file   Lifestyle    Physical activity     Days per week: Not on file     Minutes per session: Not on file    Stress: Not on file   Relationships    Social connections     Talks on phone: Not on file     Gets together: Not on file     Attends Evangelical service: Not on file     Active member of club or organization: Not on file     Attends meetings of clubs or organizations: Not on file     Relationship status: Not on file    Intimate partner violence     Fear of current or ex partner: Not on file     Emotionally abused: Not on file     Physically abused: Not on file     Forced sexual activity: Not on file   Other Topics Concern    Not on file   Social History Narrative    Not on file     No family history on file. REVIEW OF SYSTEMS:     General/Constitution:  (-)weight loss, (-)fever, (-)chills, (-)weakness. Skin: (-) rash,(-) psoriasis,(-) eczema, (-)skin cancer. Musculoskeletal: (-) fractures,  (-) dislocations,(-) collagen vascular disease, (-) fibromyalgia, (-) multiple sclerosis, (-) muscular dystrophy, (-) RSD,(-) joint pain (-)swelling, (-) joint pain,swelling. Neurologic: (-) epilepsy, (-)seizures,(-) brain tumor,(-) TIA, (-)stroke, (-)headaches, (-)Parkinson disease,(-) memory loss, (-) LOC. Cardiovascular: (-) Chest pain, (-) swelling in legs/feet, (-) SOB, (-) cramping in legs/feet with walking. Respiratory: (-) SOB, (-) Coughing, (-) night sweats. GI: (-) nausea, (-) vomiting, (-) diarrhea, (-) blood in stool, (-) gastric ulcer. Psychiatric: (-) Depression, (-) Anxiety, (-) bipolar disease, (-) Alzheimer's Disease  Allergic/Immunologic: (-) allergies latex, (-) allergies metal, (-) skin sensitivity. Hematlogic: (-) anemia, (-) blood transfusion, (-) DVT/PE, (-) Clotting disorders    SUBJECTIVE:  _Ht 6' 1\" (1.854 m)   Wt 230 lb (104.3 kg)   BMI 30.34 kg/m²  Vital signs are stable. In general, patient is awake, alert and oriented X3, in no apparent distress. Examination of HENT reveals normocephalic, atraumatic. PERRLA/EOMI sclera are white. Conjunctivae are clear. TM's are intact. Pharynx is pink and moist.  Uvula and tongue are midline. Heart: Positive S1 and positive S2 with regular rate and rhythm.   Lungs: Clear to auscultation bilaterally without rales, rhonchi or wheezes. Abdomen: soft, nontender. Positive bowel sounds. No organomegaly. No guarding or rigidity. Constitution:  The patient is alert and oriented x 3, appears to be stated age and in no distress. Ht 6' 1\" (1.854 m)   Wt 230 lb (104.3 kg)   BMI 30.34 kg/m²       Skin:  Upon inspection: the skin appears warm, dry and intact. There is not a previous scar over the affected area. There is not any cellulitis, lymphedema or cutaneous lesions noted in the lower extremities. Upon palpation there is no induration noted. Neurologic:  Gait: normal;  Motor exam of the upper extremities show: The reflexes in biceps/triceps/brachioradialis are equal and symmetric. Sensory exam C5-T1 are normal bilaterally. .        Cardiovascular: The vascular exam is normal and is well perfused to distal extremities. There are 2+ radial pulses bilaterally, and motor and sensation is intact to median, ulnar, and radial, musclocutaneus, and axillary nerve distribution and grossly symmetric bilaterally. There is cap refill noted less than two seconds in all digits. There is not edema of the bilateral upper extremities. There is not varicosities noted in the distal extremities. Lymph:  Upon palpation,  there is no lymphadenopathy noted in bilateral upper extremities. Musculoskeletal:  Gait: normal; examination of the nails and digits reveal no cyanosis or clubbing. Cervical Exam:  On physical exam, Jimmy Archuleta is well-developed, well-nourished, oriented to person, place and time. his gait is normal.  On evaluation of his cervical spine, he has full range of motion of the cervical spine without pain. There is no cervical tenderness to palpation. Shoulder Exam:   On evaluation of his bilaterally upper extremities, his right shoulder has no deformity. There is tenderness upon palpation of the anterior and lateral shoulder. There is not evidence of scapular dyskinesis.   There is not muscle atrophy in shoulder girdle. The range of motion for the Right Shoulder is 140/45/t10 and for the Left shoulder is 15/045/t8. Right shoulder Motor strength is 5/5 in the supraspinatus, 5/5 internal rotation and 5/5 in external rotation, and Left shoulder motor strength 5/5 in supraspinatus, 5/5 in internal rotation, 5/5 in external rotation. Right shoulder:  positive Impingement , positive Robertson ,negative  Speeds,negative  Apprehension ,negative Hurley Load Shift, negative Suly manuver, negative Cross arm test.     Left shoulder:  negative Impingement , negative Robertson ,negative  Speeds,negative  Apprehension ,negative Hurley Load Shift, negative Suly manuver, negative Cross arm test.     Hand exam:  The skin overlying the hand is  intact. There is not evidence of scar, lesion, laceration, or abrasion. The motion in the small joints of the hand are intact with no stiffness or deformity. The ROM in the MCP flexion 90/ extension 0 , PIP flexion 90/ extension 0, DIP flexion 70/ extension 0. There is not rotational deformity. There is no masses or adenopathy in bilateral upper extremities. Radial pulses are 2+ and symmetric bilaterally. Capillary refill is intact and < 2 seconds. Motor strength is 5/5 with flexion and extension of the small finger joints. + locking left ring finger    Right:  Phallens sign(-), Tinnells sign (-), Median nerve compression test (-),  Finklesteins (-), [facility] Grind test (-), [employer](-). Left:    Phallens sign(-), Tinnells sign (-), Median nerve compression test (-),  Finklesteins (-), [facility] Grind test (-), [employer](-). X-ray:  Mild glenohumeral arthritis    MRI:  n/a    Radiographic findings reviewed with patient        Impression:       Encounter Diagnoses   Name Primary?  Right shoulder pain, unspecified chronicity Yes    Shoulder impingement, right     Trigger finger, left ring finger        Plan:  Natural history and expected course discussed. Questions answered. Educational material distributed. Reduction in offending activity. Gentle ROM exercises  We will perform a Left trigger finger release of the ring finger, will call with date. The risks and benefits were reviewed with the patient such as:  DVT, infection,  injuries to blood vessels and nerves, non relief of symptoms, continued pain, worsening of symptoms. At least 25 minutes was spent discussing the diagnosis and treatment options with the patient with at least 50% of the time was spent with decision making and counseling the patient. The patient was counseled at length about the risks of hannah Covid-19 during their perioperative period and any recovery window from their procedure. The patient was made aware that hannah Covid-19  may worsen their prognosis for recovering from their procedure  and lend to a higher morbidity and/or mortality risk. All material risks, benefits, and reasonable alternatives including postponing the procedure were discussed. The patient does wish to proceed with the procedure at this time.     Ok to return to work quickly after surgery , will apply soft dressing

## 2021-03-09 DIAGNOSIS — B35.1 ONYCHOMYCOSIS: Primary | ICD-10-CM

## 2021-03-28 DIAGNOSIS — R97.20 ELEVATED PSA: ICD-10-CM

## 2021-03-29 RX ORDER — FINASTERIDE 5 MG/1
5 TABLET, FILM COATED ORAL DAILY
Qty: 30 TABLET | Refills: 5 | Status: SHIPPED
Start: 2021-03-29 | End: 2021-04-25 | Stop reason: SDUPTHER

## 2021-04-22 ENCOUNTER — OFFICE VISIT (OUTPATIENT)
Dept: FAMILY MEDICINE CLINIC | Age: 56
End: 2021-04-22
Payer: COMMERCIAL

## 2021-04-22 ENCOUNTER — OFFICE VISIT (OUTPATIENT)
Dept: PODIATRY | Age: 56
End: 2021-04-22
Payer: COMMERCIAL

## 2021-04-22 VITALS — BODY MASS INDEX: 31.14 KG/M2 | HEIGHT: 73 IN | WEIGHT: 235 LBS

## 2021-04-22 VITALS
RESPIRATION RATE: 18 BRPM | HEART RATE: 59 BPM | OXYGEN SATURATION: 98 % | BODY MASS INDEX: 31.14 KG/M2 | WEIGHT: 235 LBS | HEIGHT: 73 IN | SYSTOLIC BLOOD PRESSURE: 118 MMHG | DIASTOLIC BLOOD PRESSURE: 80 MMHG

## 2021-04-22 DIAGNOSIS — J30.1 SEASONAL ALLERGIC RHINITIS DUE TO POLLEN: ICD-10-CM

## 2021-04-22 DIAGNOSIS — M79.671 PAIN IN BOTH FEET: ICD-10-CM

## 2021-04-22 DIAGNOSIS — E78.2 MIXED HYPERLIPIDEMIA: ICD-10-CM

## 2021-04-22 DIAGNOSIS — R73.01 IFG (IMPAIRED FASTING GLUCOSE): ICD-10-CM

## 2021-04-22 DIAGNOSIS — R26.2 DIFFICULTY WALKING: ICD-10-CM

## 2021-04-22 DIAGNOSIS — M79.672 PAIN IN BOTH FEET: ICD-10-CM

## 2021-04-22 DIAGNOSIS — R97.20 ELEVATED PSA: ICD-10-CM

## 2021-04-22 DIAGNOSIS — M72.2 PLANTAR FASCIITIS: Primary | ICD-10-CM

## 2021-04-22 DIAGNOSIS — R53.83 FATIGUE, UNSPECIFIED TYPE: ICD-10-CM

## 2021-04-22 DIAGNOSIS — R53.83 FATIGUE, UNSPECIFIED TYPE: Primary | ICD-10-CM

## 2021-04-22 LAB
ALBUMIN SERPL-MCNC: 4.5 G/DL (ref 3.5–5.2)
ALP BLD-CCNC: 67 U/L (ref 40–129)
ALT SERPL-CCNC: 42 U/L (ref 0–40)
ANION GAP SERPL CALCULATED.3IONS-SCNC: 14 MMOL/L (ref 7–16)
AST SERPL-CCNC: 30 U/L (ref 0–39)
BASOPHILS ABSOLUTE: 0.05 E9/L (ref 0–0.2)
BASOPHILS RELATIVE PERCENT: 0.8 % (ref 0–2)
BILIRUB SERPL-MCNC: 1.2 MG/DL (ref 0–1.2)
BUN BLDV-MCNC: 16 MG/DL (ref 6–20)
CALCIUM SERPL-MCNC: 9.7 MG/DL (ref 8.6–10.2)
CHLORIDE BLD-SCNC: 110 MMOL/L (ref 98–107)
CHOLESTEROL, TOTAL: 204 MG/DL (ref 0–199)
CO2: 21 MMOL/L (ref 22–29)
CREAT SERPL-MCNC: 1 MG/DL (ref 0.7–1.2)
EOSINOPHILS ABSOLUTE: 0.17 E9/L (ref 0.05–0.5)
EOSINOPHILS RELATIVE PERCENT: 2.7 % (ref 0–6)
GFR AFRICAN AMERICAN: >60
GFR NON-AFRICAN AMERICAN: >60 ML/MIN/1.73
GLUCOSE BLD-MCNC: 113 MG/DL (ref 74–99)
HBA1C MFR BLD: 6.1 % (ref 4–5.6)
HCT VFR BLD CALC: 49.8 % (ref 37–54)
HDLC SERPL-MCNC: 41 MG/DL
HEMOGLOBIN: 16.2 G/DL (ref 12.5–16.5)
IMMATURE GRANULOCYTES #: 0.04 E9/L
IMMATURE GRANULOCYTES %: 0.6 % (ref 0–5)
LDL CHOLESTEROL CALCULATED: 137 MG/DL (ref 0–99)
LYMPHOCYTES ABSOLUTE: 1.88 E9/L (ref 1.5–4)
LYMPHOCYTES RELATIVE PERCENT: 29.3 % (ref 20–42)
MCH RBC QN AUTO: 27.9 PG (ref 26–35)
MCHC RBC AUTO-ENTMCNC: 32.5 % (ref 32–34.5)
MCV RBC AUTO: 85.7 FL (ref 80–99.9)
MONOCYTES ABSOLUTE: 0.59 E9/L (ref 0.1–0.95)
MONOCYTES RELATIVE PERCENT: 9.2 % (ref 2–12)
NEUTROPHILS ABSOLUTE: 3.68 E9/L (ref 1.8–7.3)
NEUTROPHILS RELATIVE PERCENT: 57.4 % (ref 43–80)
PDW BLD-RTO: 13.5 FL (ref 11.5–15)
PLATELET # BLD: 302 E9/L (ref 130–450)
PMV BLD AUTO: 10.6 FL (ref 7–12)
POTASSIUM SERPL-SCNC: 4.8 MMOL/L (ref 3.5–5)
PROSTATE SPECIFIC ANTIGEN: 3.41 NG/ML (ref 0–4)
RBC # BLD: 5.81 E12/L (ref 3.8–5.8)
SODIUM BLD-SCNC: 145 MMOL/L (ref 132–146)
T4 FREE: 1.12 NG/DL (ref 0.93–1.7)
TOTAL PROTEIN: 7.3 G/DL (ref 6.4–8.3)
TRIGL SERPL-MCNC: 130 MG/DL (ref 0–149)
TSH SERPL DL<=0.05 MIU/L-ACNC: 1.59 UIU/ML (ref 0.27–4.2)
URIC ACID, SERUM: 6.1 MG/DL (ref 3.4–7)
VLDLC SERPL CALC-MCNC: 26 MG/DL
WBC # BLD: 6.4 E9/L (ref 4.5–11.5)

## 2021-04-22 PROCEDURE — 99213 OFFICE O/P EST LOW 20 MIN: CPT | Performed by: FAMILY MEDICINE

## 2021-04-22 PROCEDURE — 99213 OFFICE O/P EST LOW 20 MIN: CPT | Performed by: PODIATRIST

## 2021-04-22 RX ORDER — FLUTICASONE PROPIONATE 50 MCG
2 SPRAY, SUSPENSION (ML) NASAL DAILY
Qty: 1 BOTTLE | Refills: 5 | Status: SHIPPED
Start: 2021-04-22 | End: 2021-10-31 | Stop reason: SDUPTHER

## 2021-04-22 ASSESSMENT — PATIENT HEALTH QUESTIONNAIRE - PHQ9
2. FEELING DOWN, DEPRESSED OR HOPELESS: 0
SUM OF ALL RESPONSES TO PHQ9 QUESTIONS 1 & 2: 0
SUM OF ALL RESPONSES TO PHQ QUESTIONS 1-9: 0

## 2021-04-22 NOTE — PROGRESS NOTES
Patient is here for follow up insoles. Patient states that they are great!  Ragini Vick DO 4/22/2021  Electronically signed by Donald Smith LPN on 9/96/9806 at 6:51 PM

## 2021-04-23 NOTE — PROGRESS NOTES
21     Christine Leon    : 1965   Sex: male    Age: 63 y.o. Patient's PCP/Provider is:  Eugenio Billingsley DO    Subjective:  Patient is seen today for follow-up regarding continued evaluation regarding plantar fascial issues bilateral foot. Stated the OTC insoles have helped out a lot with his previous symptoms. Patient did purchase new shoe gear which have also helped out with his previous issues. He denies any additional issues at this time. Chief Complaint   Patient presents with    Follow-up     insoles       ROS:  Const: Positives and pertinent negatives as per HPI. Musculo: Denies symptoms other than stated above. Neuro: Denies symptoms other than stated above. Skin: Denies symptoms other than stated above. Current Medications:    Current Outpatient Medications:     fluticasone (FLONASE) 50 MCG/ACT nasal spray, 2 sprays by Each Nostril route daily, Disp: 1 Bottle, Rfl: 5    finasteride (PROSCAR) 5 MG tablet, Take 1 tablet by mouth daily, Disp: 30 tablet, Rfl: 5    ciclopirox (PENLAC) 8 % solution, Apply topically daily to affected nails. , Disp: 6 mL, Rfl: 2    Allergies:  No Known Allergies    Vitals:    21 1359   Weight: 235 lb (106.6 kg)   Height: 6' 1\" (1.854 m)       Exam:  Vascular status unchanged. Minimal tenderness noted into the plantar heel and arch regions bilaterally. No other additional abnormalities noted at this time. Increased range of motion noted ankle, subtalar joint, and MTPJ regions bilateral lower extremities. Diagnostic Studies:     No results found. Procedures:    None    Plan Per Assessment  Jaye So was seen today for follow-up. Diagnoses and all orders for this visit:    Plantar fasciitis    Pain in both feet    Difficulty walking      1. Evaluation and management  2. We did discuss continued treatment options with patient in detail today regarding continued use of his OTC insoles and supportive shoe gear.   3. Patient was advised daily stretching and strengthening exercises to prevent reoccurrence of symptoms. 4. Patient will be followed up at a later date if any further Podiatric issues arise. Seen By:    Ana Montes DPM    Electronically signed by Ana Montes DPM on 4/23/2021 at 10:18 AM    This note was created using voice recognition software. The note was reviewed however may contain grammatical errors.

## 2021-04-25 RX ORDER — FINASTERIDE 5 MG/1
5 TABLET, FILM COATED ORAL DAILY
Qty: 30 TABLET | Refills: 5 | Status: SHIPPED
Start: 2021-04-25 | End: 2021-10-31 | Stop reason: SDUPTHER

## 2021-04-25 ASSESSMENT — ENCOUNTER SYMPTOMS
STRIDOR: 0
VOICE CHANGE: 0
ABDOMINAL DISTENTION: 0
VOMITING: 0
COUGH: 0
EYE PAIN: 0
EYE ITCHING: 0
CHOKING: 0
ANAL BLEEDING: 0
COLOR CHANGE: 0
SORE THROAT: 0
BACK PAIN: 0
NAUSEA: 0
APNEA: 0
FACIAL SWELLING: 0
EYE DISCHARGE: 0
ABDOMINAL PAIN: 0
WHEEZING: 0
CHEST TIGHTNESS: 0
RECTAL PAIN: 0
RHINORRHEA: 0
PHOTOPHOBIA: 0
EYE REDNESS: 0
TROUBLE SWALLOWING: 0
BLOOD IN STOOL: 0
SHORTNESS OF BREATH: 0
DIARRHEA: 0
SINUS PRESSURE: 0
CONSTIPATION: 0

## 2021-04-25 NOTE — PROGRESS NOTES
Earl Guevara is a 63 y.o. male  . Subjective:      Benign Prostatic Hypertrophy  This is a new problem. The current episode started more than 1 month ago. The problem has been waxing and waning since onset. Irritative symptoms include frequency. Irritative symptoms do not include urgency. Obstructive symptoms include a weak stream. Associated symptoms include hesitancy. Pertinent negatives include no chills, dysuria, hematuria, nausea or vomiting. AUA score is 0-7. Nothing aggravates the symptoms. Past treatments include nothing. The treatment provided no relief. Review of Systems   Constitutional: Negative for activity change, appetite change, chills, diaphoresis, fatigue, fever and unexpected weight change. HENT: Negative for congestion, dental problem, drooling, ear discharge, ear pain, facial swelling, hearing loss, mouth sores, nosebleeds, postnasal drip, rhinorrhea, sinus pressure, sneezing, sore throat, tinnitus, trouble swallowing and voice change. Eyes: Negative for photophobia, pain, discharge, redness, itching and visual disturbance. Respiratory: Negative for apnea, cough, choking, chest tightness, shortness of breath, wheezing and stridor. Cardiovascular: Negative for chest pain, palpitations and leg swelling. Gastrointestinal: Negative for abdominal distention, abdominal pain, anal bleeding, blood in stool, constipation, diarrhea, nausea, rectal pain and vomiting. Endocrine: Negative for cold intolerance, heat intolerance, polydipsia, polyphagia and polyuria. Genitourinary: Positive for frequency and hesitancy. Negative for decreased urine volume, difficulty urinating, discharge, dysuria, enuresis, flank pain, genital sores, hematuria, penile pain, penile swelling, scrotal swelling, testicular pain and urgency. Musculoskeletal: Negative for arthralgias, back pain, gait problem, joint swelling, myalgias, neck pain and neck stiffness.    Skin: Negative for color change, pallor, rash and wound. Allergic/Immunologic: Negative for environmental allergies, food allergies and immunocompromised state. Neurological: Negative for dizziness, tremors, seizures, syncope, facial asymmetry, speech difficulty, weakness, light-headedness, numbness and headaches. Hematological: Negative for adenopathy. Does not bruise/bleed easily. Psychiatric/Behavioral: Negative for agitation, behavioral problems, confusion, decreased concentration, dysphoric mood, hallucinations, self-injury, sleep disturbance and suicidal ideas. The patient is not nervous/anxious and is not hyperactive. No past medical history on file.     Social History     Socioeconomic History    Marital status:      Spouse name: Not on file    Number of children: Not on file    Years of education: Not on file    Highest education level: Not on file   Occupational History    Not on file   Social Needs    Financial resource strain: Not on file    Food insecurity     Worry: Not on file     Inability: Not on file    Transportation needs     Medical: Not on file     Non-medical: Not on file   Tobacco Use    Smoking status: Never Smoker    Smokeless tobacco: Never Used   Substance and Sexual Activity    Alcohol use: No     Comment: beer once a year    Drug use: No    Sexual activity: Not on file   Lifestyle    Physical activity     Days per week: Not on file     Minutes per session: Not on file    Stress: Not on file   Relationships    Social connections     Talks on phone: Not on file     Gets together: Not on file     Attends Anabaptist service: Not on file     Active member of club or organization: Not on file     Attends meetings of clubs or organizations: Not on file     Relationship status: Not on file    Intimate partner violence     Fear of current or ex partner: Not on file     Emotionally abused: Not on file     Physically abused: Not on file     Forced sexual activity: Not on file   Other Topics Concern  Not on file   Social History Narrative    Not on file       No family history on file. Current Outpatient Medications on File Prior to Visit   Medication Sig Dispense Refill    finasteride (PROSCAR) 5 MG tablet Take 1 tablet by mouth daily 30 tablet 5    ciclopirox (PENLAC) 8 % solution Apply topically daily to affected nails. 6 mL 2     No current facility-administered medications on file prior to visit. No Known Allergies    I have reviewed his allergies, medications, problem list, medical,social and family history and have updated as needed in the electronic medical record. Objective:     Physical Exam  Constitutional:       General: He is not in acute distress. Appearance: Normal appearance. He is normal weight. He is not ill-appearing, toxic-appearing or diaphoretic. HENT:      Head: Normocephalic and atraumatic. Right Ear: Tympanic membrane normal.      Left Ear: Tympanic membrane normal.      Nose: Nose normal.      Mouth/Throat:      Mouth: Mucous membranes are dry. Pharynx: Oropharynx is clear. Eyes:      Extraocular Movements: Extraocular movements intact. Conjunctiva/sclera: Conjunctivae normal.      Pupils: Pupils are equal, round, and reactive to light. Neck:      Musculoskeletal: Normal range of motion and neck supple. Cardiovascular:      Rate and Rhythm: Normal rate and regular rhythm. Pulses: Normal pulses. Heart sounds: Normal heart sounds. Pulmonary:      Effort: Pulmonary effort is normal. No respiratory distress. Breath sounds: Normal breath sounds. No stridor. No wheezing, rhonchi or rales. Chest:      Chest wall: No tenderness. Genitourinary:     Comments: Deferred by patient  Musculoskeletal: Normal range of motion. Skin:     General: Skin is warm and dry. Neurological:      General: No focal deficit present. Mental Status: He is alert and oriented to person, place, and time.    Psychiatric:         Mood and Affect:

## 2021-05-28 DIAGNOSIS — R53.83 FATIGUE, UNSPECIFIED TYPE: ICD-10-CM

## 2021-05-28 DIAGNOSIS — E78.2 MIXED HYPERLIPIDEMIA: ICD-10-CM

## 2021-05-28 DIAGNOSIS — J30.1 SEASONAL ALLERGIC RHINITIS DUE TO POLLEN: ICD-10-CM

## 2021-05-28 DIAGNOSIS — R73.01 IFG (IMPAIRED FASTING GLUCOSE): ICD-10-CM

## 2021-05-28 LAB
ALBUMIN SERPL-MCNC: 4.5 G/DL (ref 3.5–5.2)
ALP BLD-CCNC: 69 U/L (ref 40–129)
ALT SERPL-CCNC: 30 U/L (ref 0–40)
ANION GAP SERPL CALCULATED.3IONS-SCNC: 13 MMOL/L (ref 7–16)
AST SERPL-CCNC: 27 U/L (ref 0–39)
BASOPHILS ABSOLUTE: 0.04 E9/L (ref 0–0.2)
BASOPHILS RELATIVE PERCENT: 0.6 % (ref 0–2)
BILIRUB SERPL-MCNC: 1.4 MG/DL (ref 0–1.2)
BUN BLDV-MCNC: 17 MG/DL (ref 6–20)
CALCIUM SERPL-MCNC: 9.7 MG/DL (ref 8.6–10.2)
CHLORIDE BLD-SCNC: 105 MMOL/L (ref 98–107)
CHOLESTEROL, TOTAL: 166 MG/DL (ref 0–199)
CO2: 24 MMOL/L (ref 22–29)
CREAT SERPL-MCNC: 0.9 MG/DL (ref 0.7–1.2)
EOSINOPHILS ABSOLUTE: 0.18 E9/L (ref 0.05–0.5)
EOSINOPHILS RELATIVE PERCENT: 2.9 % (ref 0–6)
GFR AFRICAN AMERICAN: >60
GFR NON-AFRICAN AMERICAN: >60 ML/MIN/1.73
GLUCOSE BLD-MCNC: 104 MG/DL (ref 74–99)
HBA1C MFR BLD: 6.2 % (ref 4–5.6)
HCT VFR BLD CALC: 49.4 % (ref 37–54)
HDLC SERPL-MCNC: 42 MG/DL
HEMOGLOBIN: 15.6 G/DL (ref 12.5–16.5)
IMMATURE GRANULOCYTES #: 0.02 E9/L
IMMATURE GRANULOCYTES %: 0.3 % (ref 0–5)
LDL CHOLESTEROL CALCULATED: 104 MG/DL (ref 0–99)
LYMPHOCYTES ABSOLUTE: 2.14 E9/L (ref 1.5–4)
LYMPHOCYTES RELATIVE PERCENT: 34.3 % (ref 20–42)
MCH RBC QN AUTO: 28 PG (ref 26–35)
MCHC RBC AUTO-ENTMCNC: 31.6 % (ref 32–34.5)
MCV RBC AUTO: 88.5 FL (ref 80–99.9)
MICROALBUMIN UR-MCNC: 20 MG/L
MONOCYTES ABSOLUTE: 0.53 E9/L (ref 0.1–0.95)
MONOCYTES RELATIVE PERCENT: 8.5 % (ref 2–12)
NEUTROPHILS ABSOLUTE: 3.33 E9/L (ref 1.8–7.3)
NEUTROPHILS RELATIVE PERCENT: 53.4 % (ref 43–80)
PDW BLD-RTO: 13.4 FL (ref 11.5–15)
PLATELET # BLD: 282 E9/L (ref 130–450)
PMV BLD AUTO: 10.9 FL (ref 7–12)
POTASSIUM SERPL-SCNC: 4.7 MMOL/L (ref 3.5–5)
RBC # BLD: 5.58 E12/L (ref 3.8–5.8)
SODIUM BLD-SCNC: 142 MMOL/L (ref 132–146)
TOTAL PROTEIN: 7.3 G/DL (ref 6.4–8.3)
TRIGL SERPL-MCNC: 102 MG/DL (ref 0–149)
TSH SERPL DL<=0.05 MIU/L-ACNC: 1.48 UIU/ML (ref 0.27–4.2)
VLDLC SERPL CALC-MCNC: 20 MG/DL
WBC # BLD: 6.2 E9/L (ref 4.5–11.5)

## 2021-06-03 ENCOUNTER — OFFICE VISIT (OUTPATIENT)
Dept: PODIATRY | Age: 56
End: 2021-06-03
Payer: COMMERCIAL

## 2021-06-03 DIAGNOSIS — M72.2 PLANTAR FASCIITIS: Primary | ICD-10-CM

## 2021-06-03 DIAGNOSIS — R26.2 DIFFICULTY WALKING: ICD-10-CM

## 2021-06-03 PROBLEM — M79.672 PAIN IN BOTH FEET: Status: RESOLVED | Noted: 2021-03-05 | Resolved: 2021-06-03

## 2021-06-03 PROBLEM — M79.671 PAIN IN BOTH FEET: Status: RESOLVED | Noted: 2021-03-05 | Resolved: 2021-06-03

## 2021-06-03 PROCEDURE — 99213 OFFICE O/P EST LOW 20 MIN: CPT | Performed by: PODIATRIST

## 2021-06-03 NOTE — PROGRESS NOTES
Patient is in today for 6 week follow up. Patient says his feet are doing much better and has been wearing his inserts and better shoes when walking long distances. Patient also has been using the Penlac for the split toenail.  pcp is Eugenio Billingsley DO  Last ov 4/22/21

## 2021-06-03 NOTE — PROGRESS NOTES
6/3/21     Tita Odor    : 1965   Sex: male    Age: 63 y.o. Patient's PCP/Provider is:  Suresh Patel DO    Subjective:  Patient is seen today for follow-up regarding continued evaluation regarding plantar fascial issues to both feet. Patient also presents to discuss treatment regarding a fungal nail issue to his left great toe. Patient is doing much better at this time with use of the OTC insoles recommended and shoe gear recommendations. Patient has noticed improvement in the left great toenail as well with current use of the topical nail lacquer. No other additional abnormalities noted at this time. Chief Complaint   Patient presents with    Foot Pain     bilateral     Nail Problem     left great toenail        ROS:  Const: Positives and pertinent negatives as per HPI. Musculo: Denies symptoms other than stated above. Neuro: Denies symptoms other than stated above. Skin: Denies symptoms other than stated above. Current Medications:    Current Outpatient Medications:     finasteride (PROSCAR) 5 MG tablet, Take 1 tablet by mouth daily, Disp: 30 tablet, Rfl: 5    fluticasone (FLONASE) 50 MCG/ACT nasal spray, 2 sprays by Each Nostril route daily, Disp: 1 Bottle, Rfl: 5    ciclopirox (PENLAC) 8 % solution, Apply topically daily to affected nails. , Disp: 6 mL, Rfl: 2    Allergies:  No Known Allergies    There were no vitals filed for this visit. Exam:  Neurovascular status unchanged. Increased range of motion noted bilateral ankle and subtalar joints. No paresthesias noted upon percussion tarsal tunnel regions bilateral foot. No edema or ecchymotic skin changes present into the plantar heel and arch regions bilaterally. Left great toe digital nail is improved to the nail bed region. No ingrown nail issues noted. No signs of infection noted left great toe. Diagnostic Studies:     No results found.       Procedures:    None    Plan Per Assessment  Halima Salinas was seen today for foot pain and nail problem. Diagnoses and all orders for this visit:    Plantar fasciitis    Difficulty walking      1. Evaluation and management  2. We did discuss continued treatment options for patient in detail today. Patient was to continue with his current OTC insoles and new shoe gear with all ambulatory activities. 3. We did discuss additional nail care techniques to prevent reoccurrence. 4. Patient will be followed up at a later date if any further Podiatric issues arise. Seen By:    Matt Hadley DPM    Electronically signed by Matt Hadley DPM on 6/3/2021 at 1:45 PM    This note was created using voice recognition software. The note was reviewed however may contain grammatical errors.

## 2021-06-04 LAB — PROSTATE SPECIFIC ANTIGEN: 5.9 NG/ML (ref 0–4)

## 2021-08-11 DIAGNOSIS — E78.2 MIXED HYPERLIPIDEMIA: ICD-10-CM

## 2021-08-11 DIAGNOSIS — R73.01 IFG (IMPAIRED FASTING GLUCOSE): ICD-10-CM

## 2021-08-11 DIAGNOSIS — R97.20 PSA ELEVATION: Primary | ICD-10-CM

## 2021-09-17 LAB
ALBUMIN SERPL-MCNC: 4.3 G/DL (ref 3.5–5.2)
ALP BLD-CCNC: 61 U/L (ref 40–129)
ALT SERPL-CCNC: 30 U/L (ref 0–40)
ANION GAP SERPL CALCULATED.3IONS-SCNC: 14 MMOL/L (ref 7–16)
AST SERPL-CCNC: 29 U/L (ref 0–39)
BASOPHILS ABSOLUTE: 0.05 E9/L (ref 0–0.2)
BASOPHILS RELATIVE PERCENT: 0.9 % (ref 0–2)
BILIRUB SERPL-MCNC: 2 MG/DL (ref 0–1.2)
BUN BLDV-MCNC: 16 MG/DL (ref 6–20)
CALCIUM SERPL-MCNC: 9.3 MG/DL (ref 8.6–10.2)
CHLORIDE BLD-SCNC: 103 MMOL/L (ref 98–107)
CHOLESTEROL, TOTAL: 189 MG/DL (ref 0–199)
CO2: 22 MMOL/L (ref 22–29)
CREAT SERPL-MCNC: 0.9 MG/DL (ref 0.7–1.2)
EOSINOPHILS ABSOLUTE: 0.21 E9/L (ref 0.05–0.5)
EOSINOPHILS RELATIVE PERCENT: 3.6 % (ref 0–6)
GFR AFRICAN AMERICAN: >60
GFR NON-AFRICAN AMERICAN: >60 ML/MIN/1.73
GLUCOSE BLD-MCNC: 102 MG/DL (ref 74–99)
HBA1C MFR BLD: 5.9 % (ref 4–5.6)
HCT VFR BLD CALC: 45 % (ref 37–54)
HDLC SERPL-MCNC: 41 MG/DL
HEMOGLOBIN: 14.8 G/DL (ref 12.5–16.5)
IMMATURE GRANULOCYTES #: 0.03 E9/L
IMMATURE GRANULOCYTES %: 0.5 % (ref 0–5)
LDL CHOLESTEROL CALCULATED: 131 MG/DL (ref 0–99)
LYMPHOCYTES ABSOLUTE: 1.95 E9/L (ref 1.5–4)
LYMPHOCYTES RELATIVE PERCENT: 33.4 % (ref 20–42)
MCH RBC QN AUTO: 28.2 PG (ref 26–35)
MCHC RBC AUTO-ENTMCNC: 32.9 % (ref 32–34.5)
MCV RBC AUTO: 85.7 FL (ref 80–99.9)
MONOCYTES ABSOLUTE: 0.58 E9/L (ref 0.1–0.95)
MONOCYTES RELATIVE PERCENT: 9.9 % (ref 2–12)
NEUTROPHILS ABSOLUTE: 3.02 E9/L (ref 1.8–7.3)
NEUTROPHILS RELATIVE PERCENT: 51.7 % (ref 43–80)
PDW BLD-RTO: 13.8 FL (ref 11.5–15)
PLATELET # BLD: 278 E9/L (ref 130–450)
PMV BLD AUTO: 10.6 FL (ref 7–12)
POTASSIUM SERPL-SCNC: 4.5 MMOL/L (ref 3.5–5)
PROSTATE SPECIFIC ANTIGEN: 3.79 NG/ML (ref 0–4)
RBC # BLD: 5.25 E12/L (ref 3.8–5.8)
SODIUM BLD-SCNC: 139 MMOL/L (ref 132–146)
TOTAL PROTEIN: 6.8 G/DL (ref 6.4–8.3)
TRIGL SERPL-MCNC: 84 MG/DL (ref 0–149)
TSH SERPL DL<=0.05 MIU/L-ACNC: 1.56 UIU/ML (ref 0.27–4.2)
VLDLC SERPL CALC-MCNC: 17 MG/DL
WBC # BLD: 5.8 E9/L (ref 4.5–11.5)

## 2021-10-01 ENCOUNTER — HOSPITAL ENCOUNTER (EMERGENCY)
Age: 56
Discharge: HOME | DRG: 871 | End: 2021-10-01
Payer: COMMERCIAL

## 2021-10-01 ENCOUNTER — APPOINTMENT (OUTPATIENT)
Dept: GENERAL RADIOLOGY | Age: 56
DRG: 871 | End: 2021-10-01
Payer: COMMERCIAL

## 2021-10-01 VITALS
OXYGEN SATURATION: 97 % | DIASTOLIC BLOOD PRESSURE: 59 MMHG | SYSTOLIC BLOOD PRESSURE: 100 MMHG | BODY MASS INDEX: 28.89 KG/M2 | HEART RATE: 82 BPM | HEIGHT: 73 IN | RESPIRATION RATE: 16 BRPM | TEMPERATURE: 99.2 F | WEIGHT: 218 LBS

## 2021-10-01 DIAGNOSIS — J18.9 PNEUMONIA DUE TO INFECTIOUS ORGANISM, UNSPECIFIED LATERALITY, UNSPECIFIED PART OF LUNG: Primary | ICD-10-CM

## 2021-10-01 LAB
ALBUMIN SERPL-MCNC: 4.1 G/DL (ref 3.5–5.2)
ALP BLD-CCNC: 54 U/L (ref 40–129)
ALT SERPL-CCNC: 36 U/L (ref 0–40)
ANION GAP SERPL CALCULATED.3IONS-SCNC: 10 MMOL/L (ref 7–16)
AST SERPL-CCNC: 55 U/L (ref 0–39)
BACTERIA: ABNORMAL /HPF
BASOPHILS ABSOLUTE: 0.02 E9/L (ref 0–0.2)
BASOPHILS RELATIVE PERCENT: 0.3 % (ref 0–2)
BILIRUB SERPL-MCNC: 0.7 MG/DL (ref 0–1.2)
BILIRUBIN URINE: NEGATIVE
BLOOD, URINE: ABNORMAL
BUN BLDV-MCNC: 15 MG/DL (ref 6–20)
CALCIUM SERPL-MCNC: 8.6 MG/DL (ref 8.6–10.2)
CHLORIDE BLD-SCNC: 102 MMOL/L (ref 98–107)
CLARITY: CLEAR
CO2: 27 MMOL/L (ref 22–29)
COLOR: YELLOW
CREAT SERPL-MCNC: 1 MG/DL (ref 0.7–1.2)
EOSINOPHILS ABSOLUTE: 0 E9/L (ref 0.05–0.5)
EOSINOPHILS RELATIVE PERCENT: 0 % (ref 0–6)
GFR AFRICAN AMERICAN: >60
GFR NON-AFRICAN AMERICAN: >60 ML/MIN/1.73
GLUCOSE BLD-MCNC: 122 MG/DL (ref 74–99)
GLUCOSE URINE: NEGATIVE MG/DL
HCT VFR BLD CALC: 49.5 % (ref 37–54)
HEMOGLOBIN: 16 G/DL (ref 12.5–16.5)
IMMATURE GRANULOCYTES #: 0.02 E9/L
IMMATURE GRANULOCYTES %: 0.3 % (ref 0–5)
KETONES, URINE: NEGATIVE MG/DL
LACTIC ACID: 1.7 MMOL/L (ref 0.5–2.2)
LACTIC ACID: 2.8 MMOL/L (ref 0.5–2.2)
LEUKOCYTE ESTERASE, URINE: NEGATIVE
LIPASE: 39 U/L (ref 13–60)
LYMPHOCYTES ABSOLUTE: 2.39 E9/L (ref 1.5–4)
LYMPHOCYTES RELATIVE PERCENT: 36.2 % (ref 20–42)
MCH RBC QN AUTO: 28.2 PG (ref 26–35)
MCHC RBC AUTO-ENTMCNC: 32.3 % (ref 32–34.5)
MCV RBC AUTO: 87.3 FL (ref 80–99.9)
MONOCYTES ABSOLUTE: 0.41 E9/L (ref 0.1–0.95)
MONOCYTES RELATIVE PERCENT: 6.2 % (ref 2–12)
NEUTROPHILS ABSOLUTE: 3.77 E9/L (ref 1.8–7.3)
NEUTROPHILS RELATIVE PERCENT: 57 % (ref 43–80)
NITRITE, URINE: NEGATIVE
PDW BLD-RTO: 13.9 FL (ref 11.5–15)
PH UA: 5 (ref 5–9)
PLATELET # BLD: 194 E9/L (ref 130–450)
PMV BLD AUTO: 10.1 FL (ref 7–12)
POTASSIUM REFLEX MAGNESIUM: 4.5 MMOL/L (ref 3.5–5)
PROTEIN UA: 100 MG/DL
RBC # BLD: 5.67 E12/L (ref 3.8–5.8)
RBC UA: ABNORMAL /HPF (ref 0–2)
SODIUM BLD-SCNC: 139 MMOL/L (ref 132–146)
SPECIFIC GRAVITY UA: >=1.03 (ref 1–1.03)
TOTAL PROTEIN: 7.1 G/DL (ref 6.4–8.3)
UROBILINOGEN, URINE: 0.2 E.U./DL
WBC # BLD: 6.6 E9/L (ref 4.5–11.5)
WBC UA: ABNORMAL /HPF (ref 0–5)

## 2021-10-01 PROCEDURE — 71046 X-RAY EXAM CHEST 2 VIEWS: CPT

## 2021-10-01 PROCEDURE — 85025 COMPLETE CBC W/AUTO DIFF WBC: CPT

## 2021-10-01 PROCEDURE — 2580000003 HC RX 258: Performed by: NURSE PRACTITIONER

## 2021-10-01 PROCEDURE — 99284 EMERGENCY DEPT VISIT MOD MDM: CPT

## 2021-10-01 PROCEDURE — 6360000002 HC RX W HCPCS: Performed by: NURSE PRACTITIONER

## 2021-10-01 PROCEDURE — 96374 THER/PROPH/DIAG INJ IV PUSH: CPT

## 2021-10-01 PROCEDURE — 83605 ASSAY OF LACTIC ACID: CPT

## 2021-10-01 PROCEDURE — 81001 URINALYSIS AUTO W/SCOPE: CPT

## 2021-10-01 PROCEDURE — 80053 COMPREHEN METABOLIC PANEL: CPT

## 2021-10-01 PROCEDURE — 94664 DEMO&/EVAL PT USE INHALER: CPT

## 2021-10-01 PROCEDURE — 83690 ASSAY OF LIPASE: CPT

## 2021-10-01 PROCEDURE — 6370000000 HC RX 637 (ALT 250 FOR IP): Performed by: NURSE PRACTITIONER

## 2021-10-01 RX ORDER — 0.9 % SODIUM CHLORIDE 0.9 %
1000 INTRAVENOUS SOLUTION INTRAVENOUS ONCE
Status: COMPLETED | OUTPATIENT
Start: 2021-10-01 | End: 2021-10-01

## 2021-10-01 RX ORDER — KETOROLAC TROMETHAMINE 30 MG/ML
30 INJECTION, SOLUTION INTRAMUSCULAR; INTRAVENOUS ONCE
Status: COMPLETED | OUTPATIENT
Start: 2021-10-01 | End: 2021-10-01

## 2021-10-01 RX ORDER — BENZONATATE 100 MG/1
100 CAPSULE ORAL 2 TIMES DAILY PRN
Qty: 10 CAPSULE | Refills: 0 | Status: SHIPPED | OUTPATIENT
Start: 2021-10-01 | End: 2021-10-06

## 2021-10-01 RX ORDER — DOXYCYCLINE HYCLATE 100 MG
100 TABLET ORAL 2 TIMES DAILY
Qty: 20 TABLET | Refills: 0 | Status: ON HOLD | OUTPATIENT
Start: 2021-10-01 | End: 2021-10-06 | Stop reason: HOSPADM

## 2021-10-01 RX ORDER — IPRATROPIUM BROMIDE AND ALBUTEROL SULFATE 2.5; .5 MG/3ML; MG/3ML
1 SOLUTION RESPIRATORY (INHALATION) ONCE
Status: COMPLETED | OUTPATIENT
Start: 2021-10-01 | End: 2021-10-01

## 2021-10-01 RX ADMIN — IPRATROPIUM BROMIDE AND ALBUTEROL SULFATE 1 AMPULE: .5; 3 SOLUTION RESPIRATORY (INHALATION) at 16:03

## 2021-10-01 RX ADMIN — KETOROLAC TROMETHAMINE 30 MG: 30 INJECTION, SOLUTION INTRAMUSCULAR at 16:42

## 2021-10-01 RX ADMIN — SODIUM CHLORIDE 1000 ML: 9 INJECTION, SOLUTION INTRAVENOUS at 16:43

## 2021-10-01 RX ADMIN — SODIUM CHLORIDE 1000 ML: 9 INJECTION, SOLUTION INTRAVENOUS at 17:32

## 2021-10-01 ASSESSMENT — PAIN SCALES - GENERAL: PAINLEVEL_OUTOF10: 8

## 2021-10-01 NOTE — ED PROVIDER NOTES
[facility]     Department of Emergency Medicine   ED  Provider Note  Admit Date/RoomTime: 10/1/2021  3:28 PM  ED Room: DES/DES      10/1/21  3:56 PM EDT      HPI: Carlos Massey is a 61 y.o. male with a past medical history of  has no past medical history on file. presenting with complaints of a cough, diarrhea, vomiting rib cage pain anteriorly, and decreased urine output. Patient reports his symptoms have been going on for about the last 3 days. Patient reports about 10 episodes of diarrhea within the last 2 days. He states that he had one episode of vomiting. Patient reports that he is drinking fluids somewhat adequately and able to eat solid foods. The patient states that these symptoms began gradually. The history is obtained from the patient. Patient does complain of a mild cough associated with it that is nonproductive. Patient denies excessive fatigue or sleeping greater than 18 hours a day. Patient denies exposure to mononucleosis. The patient denies any abdominal pain, nausea, left upper quadrant fullness, or early satiety. The patient also denies difficulty breathing, hemoptysis, neck pain/stiffness, or blurry vision, fever, chills, shortness of breath or chest pain. Denies ageusia or anosmia. Denies exposure to any sick contacts. Denies any other complaints today. Patient states that he was tested for COVID-19 at a pharmacy just prior to arrival however his results would not be ready for 2 to 3 days. Patient states he has tried over-the-counter cough syrup which has not relieved his symptoms. Patient has no other complaints currently. Review of Systems:   Pertinent positives and negatives are stated within HPI, all other systems reviewed and are negative.           --------------------------------------------- PAST HISTORY ---------------------------------------------  Past Medical History:  has no past medical history on file.     Past Surgical History:  has a past surgical history that includes hernia repair; Tonsillectomy; and cyst removal (Left). Social History:  reports that he has never smoked. He has never used smokeless tobacco. He reports that he does not drink alcohol and does not use drugs. Family History: family history is not on file. The patients home medications have been reviewed. Allergies: Patient has no known allergies. Physical exam:  Constitutional: Vital signs are reviewed the patient is comfortable. The patient is alert and oriented and conversant. Pleasant & cooperative. Well appearing & nontoxic. Speech clear. Head: The head is atraumatic and normocephalic. Face symmetrical. No audible nasal congestion. Eyes: No discharge or tearing from the eyes. The sclera are normal.  Ears: TM pearly gray bilaterally demonstrate no erythema, no bulging and no evidence of infection. Ear canal without cerumen. Mouth: Mucus membranes moist without ulcerations. No tongue/lip swelling. The oropharynx without erythema. There is no tonsillar enlargement nor is there any exudate present. No uvular deviation or edema. No tonsillary asymmetry. Floor of the mouth soft, no trismus, handling secretions. Neck: Normal range of motion is achieved in the neck. There is no JVD present. No meningeal signs are present. No cervical adenopathy. Respiratory: The chest is nontender. Lungs with the wheezing to right lobe. No rales or rhonchi. There is no respiratory distress. Respirations easy and unlabored. No audible wheezing. No stridor. Audible dry cough. Cardiovascular: S1S2, RRR, without murmurs, rubs, clicks or gallops. Abdominal exam: The abdomen is non tender without evidence of peritoneal signs. Specific attention to the left upper quadrant with palpation of the spleen demonstrates no organomegaly or tenderness. Non distended. BS x 4 quadrants. No palpable masses. Musculoskeletal: Moves all extremities x 4. Warm and well perfused. No joint swelling.   Skin: warm and dry, without rash. No lesions or open wounds. No cyanosis, jaundice or ecchymosis noted.   Neurologic: GCS 15, no tremor, no focal deficits  Psych: Normal Affect  -------------------------------------------------- RESULTS -------------------------------------------------    LABS:  Results for orders placed or performed during the hospital encounter of 10/01/21   CBC Auto Differential   Result Value Ref Range    WBC 6.6 4.5 - 11.5 E9/L    RBC 5.67 3.80 - 5.80 E12/L    Hemoglobin 16.0 12.5 - 16.5 g/dL    Hematocrit 49.5 37.0 - 54.0 %    MCV 87.3 80.0 - 99.9 fL    MCH 28.2 26.0 - 35.0 pg    MCHC 32.3 32.0 - 34.5 %    RDW 13.9 11.5 - 15.0 fL    Platelets 974 628 - 801 E9/L    MPV 10.1 7.0 - 12.0 fL    Neutrophils % 57.0 43.0 - 80.0 %    Immature Granulocytes % 0.3 0.0 - 5.0 %    Lymphocytes % 36.2 20.0 - 42.0 %    Monocytes % 6.2 2.0 - 12.0 %    Eosinophils % 0.0 0.0 - 6.0 %    Basophils % 0.3 0.0 - 2.0 %    Neutrophils Absolute 3.77 1.80 - 7.30 E9/L    Immature Granulocytes # 0.02 E9/L    Lymphocytes Absolute 2.39 1.50 - 4.00 E9/L    Monocytes Absolute 0.41 0.10 - 0.95 E9/L    Eosinophils Absolute 0.00 (L) 0.05 - 0.50 E9/L    Basophils Absolute 0.02 0.00 - 0.20 E9/L   Comprehensive Metabolic Panel w/ Reflex to MG   Result Value Ref Range    Sodium 139 132 - 146 mmol/L    Potassium reflex Magnesium 4.5 3.5 - 5.0 mmol/L    Chloride 102 98 - 107 mmol/L    CO2 27 22 - 29 mmol/L    Anion Gap 10 7 - 16 mmol/L    Glucose 122 (H) 74 - 99 mg/dL    BUN 15 6 - 20 mg/dL    CREATININE 1.0 0.7 - 1.2 mg/dL    GFR Non-African American >60 >=60 mL/min/1.73    GFR African American >60     Calcium 8.6 8.6 - 10.2 mg/dL    Total Protein 7.1 6.4 - 8.3 g/dL    Albumin 4.1 3.5 - 5.2 g/dL    Total Bilirubin 0.7 0.0 - 1.2 mg/dL    Alkaline Phosphatase 54 40 - 129 U/L    ALT 36 0 - 40 U/L    AST 55 (H) 0 - 39 U/L   Lipase   Result Value Ref Range    Lipase 39 13 - 60 U/L   Urinalysis, reflex to microscopic   Result Value Ref Range    Color, UA Yellow Straw/Yellow    Clarity, UA Clear Clear    Glucose, Ur Negative Negative mg/dL    Bilirubin Urine Negative Negative    Ketones, Urine Negative Negative mg/dL    Specific Gravity, UA >=1.030 1.005 - 1.030    Blood, Urine LARGE (A) Negative    pH, UA 5.0 5.0 - 9.0    Protein,  (A) Negative mg/dL    Urobilinogen, Urine 0.2 <2.0 E.U./dL    Nitrite, Urine Negative Negative    Leukocyte Esterase, Urine Negative Negative   Lactic Acid, Plasma   Result Value Ref Range    Lactic Acid 2.8 (H) 0.5 - 2.2 mmol/L   Microscopic Urinalysis   Result Value Ref Range    WBC, UA NONE 0 - 5 /HPF    RBC, UA NONE 0 - 2 /HPF    Bacteria, UA RARE (A) None Seen /HPF   LACTIC ACID, PLASMA   Result Value Ref Range    Lactic Acid 1.7 0.5 - 2.2 mmol/L       RADIOLOGY:  Interpreted by Radiologist.  XR CHEST (2 VW)   Final Result   Findings consistent with multifocal pneumonia.                   ------------------------- NURSING NOTES AND VITALS REVIEWED ---------------------------   The nursing notes within the ED encounter and vital signs as below have been reviewed. BP (!) 100/59   Pulse 82   Temp 99.2 °F (37.3 °C)   Resp 16   Ht 6' 1\" (1.854 m)   Wt 218 lb (98.9 kg)   SpO2 97%   BMI 28.76 kg/m²   Oxygen Saturation Interpretation: Normal    The patients available past medical records and past encounters were reviewed.         ------------------------------ ED COURSE/MEDICAL DECISION MAKING----------------------  Medications   0.9 % sodium chloride bolus (0 mLs IntraVENous Stopped 10/1/21 1732)   ketorolac (TORADOL) injection 30 mg (30 mg IntraVENous Given 10/1/21 1642)   ipratropium-albuterol (DUONEB) nebulizer solution 1 ampule (1 ampule Inhalation Given 10/1/21 1603)   0.9 % sodium chloride bolus (0 mLs IntraVENous Stopped 10/1/21 1816)       Ed Course:  Lactic elevated at 2.8, will give an additional liter of IV normal saline and repeat lactic acid  Repeat lactic 1.7  Patient feeling better, wants to go home      Medical Decision Making: Patient presenting to the emergency department today for cough, diarrhea, and possible dehydration. Patient is well appearing, without hypoxia, and appears to be non toxic. Physical examination within normal limits. XRay obtained which reveals multi focal pneumonia. Patient will be treated with Doxycycline. Patient's Covid test is pending through [address] and he should have the results within the next 2 to 3 days. Patient educated to remain on isolation until those results are obtained, per current cdc guidelines. Encouraged ample fluid intake. Patient can take tylenol/motrin for pain relief or fever if necessary. Patient will return to ED for any new symptoms or worsening of symptoms. Patient to follow up with PCP if symptoms persist.              This patient's ED course included: a personal history and physicial eaxmination and re-evaluation prior to disposition    This patient has remained hemodynamically stable during their ED course. Counseling: The emergency provider has spoken with the patient and discussed todays results, in addition to providing specific details for the plan of care and counseling regarding the diagnosis and prognosis. Questions are answered at this time and they are agreeable with the plan.       --------------------------------- IMPRESSION AND DISPOSITION ---------------------------------    IMPRESSION  1.  Pneumonia due to infectious organism, unspecified laterality, unspecified part of lung        DISPOSITION  Disposition: Discharge to home  Patient condition is good         Robert Whitmore, DEBRA - CNP  10/02/21 6894

## 2021-10-03 ENCOUNTER — APPOINTMENT (OUTPATIENT)
Dept: CT IMAGING | Age: 56
DRG: 871 | End: 2021-10-03
Payer: COMMERCIAL

## 2021-10-03 ENCOUNTER — HOSPITAL ENCOUNTER (INPATIENT)
Age: 56
LOS: 3 days | Discharge: HOME | DRG: 871 | End: 2021-10-06
Attending: EMERGENCY MEDICINE | Admitting: INTERNAL MEDICINE
Payer: COMMERCIAL

## 2021-10-03 DIAGNOSIS — U07.1 COVID: ICD-10-CM

## 2021-10-03 DIAGNOSIS — U07.1 COVID-19: ICD-10-CM

## 2021-10-03 DIAGNOSIS — J96.01 ACUTE RESPIRATORY FAILURE WITH HYPOXIA (HCC): Primary | ICD-10-CM

## 2021-10-03 PROBLEM — A08.39 DIARRHEA DUE TO COVID-19: Status: ACTIVE | Noted: 2021-10-03

## 2021-10-03 LAB
ALBUMIN SERPL-MCNC: 3.3 G/DL (ref 3.5–5.2)
ALP BLD-CCNC: 43 U/L (ref 40–129)
ALT SERPL-CCNC: 37 U/L (ref 0–40)
ANION GAP SERPL CALCULATED.3IONS-SCNC: 9 MMOL/L (ref 7–16)
AST SERPL-CCNC: 61 U/L (ref 0–39)
BASOPHILS ABSOLUTE: 0 E9/L (ref 0–0.2)
BASOPHILS RELATIVE PERCENT: 0.3 % (ref 0–2)
BILIRUB SERPL-MCNC: 0.7 MG/DL (ref 0–1.2)
BUN BLDV-MCNC: 14 MG/DL (ref 6–20)
C-REACTIVE PROTEIN: 7.6 MG/DL (ref 0–0.4)
CALCIUM SERPL-MCNC: 8.4 MG/DL (ref 8.6–10.2)
CHLORIDE BLD-SCNC: 105 MMOL/L (ref 98–107)
CO2: 23 MMOL/L (ref 22–29)
CREAT SERPL-MCNC: 0.7 MG/DL (ref 0.7–1.2)
D DIMER: 890 NG/ML DDU
EOSINOPHILS ABSOLUTE: 0 E9/L (ref 0.05–0.5)
EOSINOPHILS RELATIVE PERCENT: 0 % (ref 0–6)
FERRITIN: 720 NG/ML
FIBRINOGEN: 688 MG/DL (ref 225–540)
GFR AFRICAN AMERICAN: >60
GFR NON-AFRICAN AMERICAN: >60 ML/MIN/1.73
GLUCOSE BLD-MCNC: 118 MG/DL (ref 74–99)
HCT VFR BLD CALC: 42.6 % (ref 37–54)
HEMOGLOBIN: 14 G/DL (ref 12.5–16.5)
LACTATE DEHYDROGENASE: 444 U/L (ref 135–225)
LACTIC ACID: 1 MMOL/L (ref 0.5–2.2)
LYMPHOCYTES ABSOLUTE: 0.41 E9/L (ref 1.5–4)
LYMPHOCYTES RELATIVE PERCENT: 7 % (ref 20–42)
MCH RBC QN AUTO: 28.4 PG (ref 26–35)
MCHC RBC AUTO-ENTMCNC: 32.9 % (ref 32–34.5)
MCV RBC AUTO: 86.4 FL (ref 80–99.9)
METAMYELOCYTES RELATIVE PERCENT: 0.9 % (ref 0–1)
MONOCYTES ABSOLUTE: 0.12 E9/L (ref 0.1–0.95)
MONOCYTES RELATIVE PERCENT: 1.7 % (ref 2–12)
NEUTROPHILS ABSOLUTE: 5.28 E9/L (ref 1.8–7.3)
NEUTROPHILS RELATIVE PERCENT: 90.4 % (ref 43–80)
PDW BLD-RTO: 13.8 FL (ref 11.5–15)
PLATELET # BLD: 193 E9/L (ref 130–450)
PMV BLD AUTO: 10.2 FL (ref 7–12)
POTASSIUM REFLEX MAGNESIUM: 3.6 MMOL/L (ref 3.5–5)
PRO-BNP: 274 PG/ML (ref 0–125)
PROCALCITONIN: 0.19 NG/ML (ref 0–0.08)
PROCALCITONIN: 0.19 NG/ML (ref 0–0.08)
RBC # BLD: 4.93 E12/L (ref 3.8–5.8)
SARS-COV-2, NAAT: DETECTED
SODIUM BLD-SCNC: 137 MMOL/L (ref 132–146)
TOTAL PROTEIN: 5.9 G/DL (ref 6.4–8.3)
TROPONIN, HIGH SENSITIVITY: 7 NG/L (ref 0–11)
WBC # BLD: 5.8 E9/L (ref 4.5–11.5)

## 2021-10-03 PROCEDURE — 83615 LACTATE (LD) (LDH) ENZYME: CPT

## 2021-10-03 PROCEDURE — 36415 COLL VENOUS BLD VENIPUNCTURE: CPT

## 2021-10-03 PROCEDURE — 82728 ASSAY OF FERRITIN: CPT

## 2021-10-03 PROCEDURE — 87635 SARS-COV-2 COVID-19 AMP PRB: CPT

## 2021-10-03 PROCEDURE — 83880 ASSAY OF NATRIURETIC PEPTIDE: CPT

## 2021-10-03 PROCEDURE — 6360000004 HC RX CONTRAST MEDICATION: Performed by: RADIOLOGY

## 2021-10-03 PROCEDURE — 93005 ELECTROCARDIOGRAM TRACING: CPT | Performed by: STUDENT IN AN ORGANIZED HEALTH CARE EDUCATION/TRAINING PROGRAM

## 2021-10-03 PROCEDURE — 6360000002 HC RX W HCPCS: Performed by: STUDENT IN AN ORGANIZED HEALTH CARE EDUCATION/TRAINING PROGRAM

## 2021-10-03 PROCEDURE — 99284 EMERGENCY DEPT VISIT MOD MDM: CPT

## 2021-10-03 PROCEDURE — 6370000000 HC RX 637 (ALT 250 FOR IP): Performed by: INTERNAL MEDICINE

## 2021-10-03 PROCEDURE — 84145 PROCALCITONIN (PCT): CPT

## 2021-10-03 PROCEDURE — 6360000002 HC RX W HCPCS: Performed by: INTERNAL MEDICINE

## 2021-10-03 PROCEDURE — 71275 CT ANGIOGRAPHY CHEST: CPT

## 2021-10-03 PROCEDURE — 2060000000 HC ICU INTERMEDIATE R&B

## 2021-10-03 PROCEDURE — 84484 ASSAY OF TROPONIN QUANT: CPT

## 2021-10-03 PROCEDURE — 6370000000 HC RX 637 (ALT 250 FOR IP): Performed by: STUDENT IN AN ORGANIZED HEALTH CARE EDUCATION/TRAINING PROGRAM

## 2021-10-03 PROCEDURE — 85378 FIBRIN DEGRADE SEMIQUANT: CPT

## 2021-10-03 PROCEDURE — 80053 COMPREHEN METABOLIC PANEL: CPT

## 2021-10-03 PROCEDURE — 85025 COMPLETE CBC W/AUTO DIFF WBC: CPT

## 2021-10-03 PROCEDURE — 85384 FIBRINOGEN ACTIVITY: CPT

## 2021-10-03 PROCEDURE — 2580000003 HC RX 258: Performed by: INTERNAL MEDICINE

## 2021-10-03 PROCEDURE — 86140 C-REACTIVE PROTEIN: CPT

## 2021-10-03 PROCEDURE — 87449 NOS EACH ORGANISM AG IA: CPT

## 2021-10-03 PROCEDURE — 83605 ASSAY OF LACTIC ACID: CPT

## 2021-10-03 RX ORDER — DEXAMETHASONE SODIUM PHOSPHATE 10 MG/ML
6 INJECTION INTRAMUSCULAR; INTRAVENOUS ONCE
Status: COMPLETED | OUTPATIENT
Start: 2021-10-03 | End: 2021-10-03

## 2021-10-03 RX ORDER — ONDANSETRON 2 MG/ML
4 INJECTION INTRAMUSCULAR; INTRAVENOUS EVERY 6 HOURS PRN
Status: DISCONTINUED | OUTPATIENT
Start: 2021-10-03 | End: 2021-10-06 | Stop reason: HOSPADM

## 2021-10-03 RX ORDER — ACETAMINOPHEN 325 MG/1
650 TABLET ORAL EVERY 6 HOURS PRN
Status: DISCONTINUED | OUTPATIENT
Start: 2021-10-03 | End: 2021-10-06 | Stop reason: HOSPADM

## 2021-10-03 RX ORDER — SODIUM CHLORIDE 0.9 % (FLUSH) 0.9 %
5-40 SYRINGE (ML) INJECTION EVERY 12 HOURS SCHEDULED
Status: DISCONTINUED | OUTPATIENT
Start: 2021-10-03 | End: 2021-10-06 | Stop reason: HOSPADM

## 2021-10-03 RX ORDER — SODIUM CHLORIDE 0.9 % (FLUSH) 0.9 %
5-40 SYRINGE (ML) INJECTION PRN
Status: DISCONTINUED | OUTPATIENT
Start: 2021-10-03 | End: 2021-10-06 | Stop reason: HOSPADM

## 2021-10-03 RX ORDER — ZINC SULFATE 50(220)MG
50 CAPSULE ORAL DAILY
Status: DISCONTINUED | OUTPATIENT
Start: 2021-10-03 | End: 2021-10-06 | Stop reason: HOSPADM

## 2021-10-03 RX ORDER — DEXAMETHASONE SODIUM PHOSPHATE 10 MG/ML
6 INJECTION INTRAMUSCULAR; INTRAVENOUS EVERY 24 HOURS
Status: DISCONTINUED | OUTPATIENT
Start: 2021-10-04 | End: 2021-10-06 | Stop reason: HOSPADM

## 2021-10-03 RX ORDER — POLYETHYLENE GLYCOL 3350 17 G/17G
17 POWDER, FOR SOLUTION ORAL DAILY PRN
Status: DISCONTINUED | OUTPATIENT
Start: 2021-10-03 | End: 2021-10-06 | Stop reason: HOSPADM

## 2021-10-03 RX ORDER — ASCORBIC ACID 500 MG
500 TABLET ORAL DAILY
Status: DISCONTINUED | OUTPATIENT
Start: 2021-10-03 | End: 2021-10-06 | Stop reason: HOSPADM

## 2021-10-03 RX ORDER — ONDANSETRON 4 MG/1
4 TABLET, ORALLY DISINTEGRATING ORAL EVERY 8 HOURS PRN
Status: DISCONTINUED | OUTPATIENT
Start: 2021-10-03 | End: 2021-10-06 | Stop reason: HOSPADM

## 2021-10-03 RX ORDER — BUDESONIDE AND FORMOTEROL FUMARATE DIHYDRATE 160; 4.5 UG/1; UG/1
2 AEROSOL RESPIRATORY (INHALATION) 2 TIMES DAILY
Status: DISCONTINUED | OUTPATIENT
Start: 2021-10-03 | End: 2021-10-06 | Stop reason: HOSPADM

## 2021-10-03 RX ORDER — ACETAMINOPHEN 650 MG/1
650 SUPPOSITORY RECTAL EVERY 6 HOURS PRN
Status: DISCONTINUED | OUTPATIENT
Start: 2021-10-03 | End: 2021-10-06 | Stop reason: HOSPADM

## 2021-10-03 RX ORDER — FLUTICASONE PROPIONATE 50 MCG
2 SPRAY, SUSPENSION (ML) NASAL DAILY
Status: DISCONTINUED | OUTPATIENT
Start: 2021-10-04 | End: 2021-10-06 | Stop reason: HOSPADM

## 2021-10-03 RX ORDER — FINASTERIDE 5 MG/1
5 TABLET, FILM COATED ORAL DAILY
Status: DISCONTINUED | OUTPATIENT
Start: 2021-10-04 | End: 2021-10-06 | Stop reason: HOSPADM

## 2021-10-03 RX ORDER — BENZONATATE 100 MG/1
100 CAPSULE ORAL 2 TIMES DAILY PRN
Status: DISCONTINUED | OUTPATIENT
Start: 2021-10-03 | End: 2021-10-06 | Stop reason: HOSPADM

## 2021-10-03 RX ORDER — SODIUM CHLORIDE 9 MG/ML
25 INJECTION, SOLUTION INTRAVENOUS PRN
Status: DISCONTINUED | OUTPATIENT
Start: 2021-10-03 | End: 2021-10-06 | Stop reason: HOSPADM

## 2021-10-03 RX ORDER — VITAMIN E 268 MG
400 CAPSULE ORAL DAILY
Status: DISCONTINUED | OUTPATIENT
Start: 2021-10-03 | End: 2021-10-04 | Stop reason: CLARIF

## 2021-10-03 RX ADMIN — BARICITINIB 4 MG: 2 TABLET, FILM COATED ORAL at 22:53

## 2021-10-03 RX ADMIN — ENOXAPARIN SODIUM 30 MG: 30 INJECTION SUBCUTANEOUS at 21:29

## 2021-10-03 RX ADMIN — DEXAMETHASONE SODIUM PHOSPHATE 6 MG: 10 INJECTION INTRAMUSCULAR; INTRAVENOUS at 14:13

## 2021-10-03 RX ADMIN — Medication 10 ML: at 21:36

## 2021-10-03 RX ADMIN — OXYCODONE HYDROCHLORIDE AND ACETAMINOPHEN 500 MG: 500 TABLET ORAL at 17:52

## 2021-10-03 RX ADMIN — ZINC SULFATE 220 MG (50 MG) CAPSULE 50 MG: CAPSULE at 17:52

## 2021-10-03 RX ADMIN — IOPAMIDOL 75 ML: 755 INJECTION, SOLUTION INTRAVENOUS at 11:45

## 2021-10-03 RX ADMIN — Medication 400 UNITS: at 17:52

## 2021-10-03 ASSESSMENT — ENCOUNTER SYMPTOMS
ABDOMINAL PAIN: 0
EYE PAIN: 0
WHEEZING: 0
EYE REDNESS: 0
SHORTNESS OF BREATH: 1
NAUSEA: 1
EYE DISCHARGE: 0
DIARRHEA: 0
VOMITING: 0
SORE THROAT: 0
BACK PAIN: 0
COUGH: 1
SINUS PRESSURE: 0

## 2021-10-03 NOTE — H&P
Department of Internal Medicine  History and Physical    Primary Care Physician: Naveed Simmons DO   Admitting Physician:  Elijah Camejo DO  Admission date and time: 10/3/2021  9:56 AM    Room:  [address] [place] [address]  Admitting diagnosis: Acute respiratory failure with hypoxia ([facility] [J96.01]  COVID [U07.1]    Patient Name: Romero Bertrand  MRN: [id number]    Date of Service: 10/3/2021       CHIEF COMPLAINT: Shortness of breath, fatigue, and myalgias    HISTORY OF PRESENT ILLNESS:    The patient is a 58-year-old male who presented to the emergency department with a complaint of cough, shortness of breath, generalized fatigue and myalgias that it started approximately 5 to 7 days ago. Patient states that on approximately October 1 he came to the emergency department for the same complaints as well as diarrhea. He states that since that visit that the diarrhea has resolved. Patient states that over the past couple of days that all of his symptoms have worsened. Denies fever but admits to chills. Again patient states that nausea and diarrhea have resolved. Does admit to loss of appetite. Patient reports he has not had the Covid vaccination. As a result of worsening symptoms he presented to the emergency department for further evaluation. After evaluation in the emergency department-see emergency room notes-it was determined that the patient needed further evaluation and treatment was admitted to the services of Dr. Ovi Gamble and Dr. Justin Nowak. PAST MEDICAL Hx:  History reviewed. No pertinent past medical history. PAST SURGICAL Hx:   Past Surgical History:   Procedure Laterality Date    CYST REMOVAL Left     as kid ganglion    HERNIA REPAIR Left     Inguinal    TONSILLECTOMY         FAMILY Hx:  History reviewed. No pertinent family history. HOME MEDICATIONS:  Prior to Admission medications    Medication Sig Start Date End Date Taking?  Authorizing Provider   doxycycline hyclate (VIBRA-TABS) 100 MG tablet Take 1 tablet by mouth 2 times daily for 10 days 10/1/21 10/11/21 Yes Meri Herzoglexie Leonard, APRN - CNP   benzonatate (TESSALON) 100 MG capsule Take 1 capsule by mouth 2 times daily as needed for Cough 10/1/21 10/6/21 Yes DEBRA Ritchie CNP   finasteride (PROSCAR) 5 MG tablet Take 1 tablet by mouth daily 4/25/21  Yes Maurice To DO   fluticasone [facility]) 50 MCG/ACT nasal spray 2 sprays by Each Nostril route daily 4/22/21  Yes Maurice To DO   ciclopirox (PENLAC) 8 % solution Apply topically daily to affected nails. 3/9/21   Angela Aguilar DPM       ALLERGIES:  Patient has no known allergies. SOCIAL Hx:  Social History     Socioeconomic History    Marital status:      Spouse name: Not on file    Number of children: 0    Years of education: Not on file    Highest education level: Not on file   Occupational History    Not on file   Tobacco Use    Smoking status: Never Smoker    Smokeless tobacco: Never Used   Vaping Use    Vaping Use: Never used   Substance and Sexual Activity    Alcohol use: No     Comment: beer once a year    Drug use: No    Sexual activity: Yes     Partners: Female   Other Topics Concern    Not on file   Social History Narrative    Not on file     Social Determinants of Health     Financial Resource Strain:     Difficulty of Paying Living Expenses:    Food Insecurity:     Worried About [employer] [employer] in the Last Year:     Ran Out of Food in the Last Year:    Transportation Needs:     Lack of Transportation (Medical):     Lack of Transportation (Non-Medical):    Physical Activity:     Days of Exercise per Week:     Minutes of Exercise per Session:    Stress:     Feeling of Stress :    Social Connections:     Frequency of Communication with Friends and Family:     Frequency of Social Gatherings with Friends and Family:     Attends Jainism Services:      Active Member of Clubs or Organizations:     Attends Club or Organization Meetings:     Marital Status:    Intimate Partner Violence:     Fear of Current or Ex-Partner:     Emotionally Abused:     Physically Abused:     Sexually Abused:        ROS:  General:   Positive for chills, fatigue, denies fever, positive for malaise, denies night sweats or weight loss    Psychological:   Denies anxiety, disorientation or hallucinations    ENT:    Denies epistaxis, headaches, vertigo or visual changes    Cardiovascular:   Denies any chest pain, irregular heartbeats, or palpitations. No paroxysmal nocturnal dyspnea. Respiratory:   Positive for shortness of breath, coughing, denies sputum production, hemoptysis, or wheezing. No orthopnea. Gastrointestinal:   Resolved nausea, vomiting, diarrhea. Denies constipation. Denies any abdominal pain. Genito-Urinary:    Denies any urgency, frequency, hematuria. Voiding without difficulty. Musculoskeletal:   Denies joint pain, joint stiffness, joint swelling    Neurology:    Denies any headache or focal neurological deficits. No weakness or paresthesia. Derm:    Denies any rashes, ulcers, or excoriations. Denies bruising. Extremities:   Denies any lower extremity swelling or edema. PHYSICAL EXAM:  VITALS:  Blood pressure (!) 120/58, pulse 77, temperature 97.7 °F (36.5 °C), resp. rate 16, weight 218 lb (98.9 kg), SpO2 96 %. CONSTITUTIONAL:    Awake, alert, cooperative, no apparent distress, and appears stated age. Ill appearing. EYES:    PERRL, EOMI, sclera clear, conjunctiva normal    ENT:    Normocephalic, atraumatic, sinuses nontender on palpation. External ears without lesions. Oral pharynx with moist mucus membranes. Oxygen on via nasal cannula. NECK:    Supple, symmetrical, trachea midline, no adenopathy, thyroid symmetric, not enlarged and no tenderness, skin normal, no bruits, no JVD    HEMATOLOGIC/LYMPHATICS:    No cervical lymphadenopathy and no supraclavicular lymphadenopathy    LUNGS:    Symmetric.  No increased work of breathing, good air exchange, clear to auscultation bilaterally, no wheezes, rhonchi, or rales, barky dry cough with deep inspiration. CARDIOVASCULAR:    Normal apical impulse, regular rate and rhythm, normal S1 and S2, no S3 or S4, and no murmur noted    ABDOMEN:    No scars, normal bowel sounds, soft, non-distended, non-tender, no masses palpated, no hepatosplenomegaly, no rebound or guarding elicited on palpation     MUSCULOSKELETAL:    There is no redness, warmth, or swelling of the joints. Full range of motion noted. Motor strength is 5 out of 5 all extremities bilaterally. Tone is normal.    NEUROLOGIC:    Awake, alert, oriented to name, place and time. Cranial nerves II-XII are grossly intact. Motor is 5 out of 5 bilaterally. SKIN:    No bruising or bleeding. No redness, warmth, or swelling    EXTREMITIES:    Peripheral pulses present. No edema, cyanosis, or swelling. OSTEOPATHIC:    Examined in seated and supine positions. Normal thoracic kyphosis and lumbar lordosis. No acute somatic dysfunction.     LABORATORY DATA:  CBC with Differential:    Lab Results   Component Value Date    WBC 5.8 10/03/2021    RBC 4.93 10/03/2021    HGB 14.0 10/03/2021    HCT 42.6 10/03/2021     10/03/2021    MCV 86.4 10/03/2021    MCH 28.4 10/03/2021    MCHC 32.9 10/03/2021    RDW 13.8 10/03/2021    METASPCT 0.9 10/03/2021    LYMPHOPCT 7.0 10/03/2021    MONOPCT 1.7 10/03/2021    BASOPCT 0.3 10/03/2021    MONOSABS 0.12 10/03/2021    LYMPHSABS 0.41 10/03/2021    EOSABS 0.00 10/03/2021    BASOSABS 0.00 10/03/2021     CMP:    Lab Results   Component Value Date     10/03/2021    K 3.6 10/03/2021     10/03/2021    CO2 23 10/03/2021    BUN 14 10/03/2021    CREATININE 0.7 10/03/2021    GFRAA >60 10/03/2021    LABGLOM >60 10/03/2021    GLUCOSE 118 10/03/2021    GLUCOSE 104 10/18/2010    PROT 5.9 10/03/2021    LABALBU 3.3 10/03/2021    LABALBU 4.4 10/18/2010    CALCIUM 8.4 10/03/2021    BILITOT 0.7 10/03/2021    ALKPHOS 43 10/03/2021    AST 61 10/03/2021    ALT 37 10/03/2021       ASSESSMENT:    Acute respiratory failure with hypoxia  COVID-19 pneumonia  Diarrhea due to Covid 19    Plan: We will admit the patient to [facility]. Supportive oxygen therapy to maintain O2 saturation above 90%-wean/titrate as warranted. Dexamethasone as prescribed. Monitor inflammatory markers. Anticoagulation: Lovenox. Tonics for GI prophylaxis. Antibiotic regimen: As per infectious disease. Activity as tolerated  Incentive spirometry as instructed. Sit up in chair as often as tolerated and prone as often as tolerated but at least every 2 hours. Continue current respiratory treatments. Maximum COVID protocol is being undertaken  Monitor blood glucose levels and treat accordingly  We will continue to address underlying comorbidities during the hospitalization. Continue current therapy. See orders for further plan of care. The patient was seen, examined and then discussed with Dr. Pacheco Marshall. DEBRA Sauceda CNP,   4:12 PM  10/3/2021    Electronically signed by DEBRA Sauceda CNP on 10/3/21 at 4:12 PM EDT      I have personally participated in the history and medical decision making with the nurse practitioner on the date of service and I agree with all the pertinent clinical information unless otherwise noted. I have also reviewed and agree with the past medical, family, and social history unless otherwise noted.       Hannah Ulloa DO, D.O., FACOI  4:25 PM  10/3/2021

## 2021-10-03 NOTE — ED PROVIDER NOTES
Chief Complaint   Patient presents with    Shortness of Breath     pending Covid results from [address] , done at [facility] aid    Fatigue     with generalized weakness       Patient is a 58-year-old male presents today for cough, shortness of breath generalized fatigue and myalgias. Symptoms started 5 to 7 days ago and have continued to progress. He states he was seen here in the department several days ago, at that time had a full work-up including labs and chest x-ray, was diagnosed with multifocal pneumonia and was discharged home on antibiotics. States his symptoms have continued to worsen. He feels like he is more short of breath than usual.  No history of MI or stent. No history of asthma or COPD. Does not wear oxygen at baseline. On arrival to the department he is 88% on room air. Did not receive his Covid vaccine. Denies recent sick contacts. Endorses generalized myalgias, fatigue, cough and shortness of breath. Symptoms not made better or worse by anything specific. He has not tried any medication other than the antibiotics he was prescribed at the day. No recent travel or surgery. No history of DVT or PE. He is not on blood thinning medications. The history is provided by the patient. No  was used. Review of Systems   Constitutional: Positive for chills and fatigue. Negative for fever. HENT: Negative for ear pain, sinus pressure and sore throat. Eyes: Negative for pain, discharge and redness. Respiratory: Positive for cough and shortness of breath. Negative for wheezing. Cardiovascular: Positive for chest pain. Gastrointestinal: Positive for nausea. Negative for abdominal pain, diarrhea and vomiting. Genitourinary: Negative for dysuria and frequency. Musculoskeletal: Negative for arthralgias and back pain. Skin: Negative for rash and wound. Neurological: Negative for weakness and headaches. Hematological: Negative for adenopathy.    All other systems reviewed and are negative. Physical Exam  Vitals and nursing note reviewed. Constitutional:       Appearance: He is well-developed. He is ill-appearing. HENT:      Head: Normocephalic and atraumatic. Eyes:      Pupils: Pupils are equal, round, and reactive to light. Cardiovascular:      Rate and Rhythm: Normal rate and regular rhythm. Pulses: Normal pulses. Heart sounds: Normal heart sounds. No murmur heard. Pulmonary:      Effort: Pulmonary effort is normal. No respiratory distress. Breath sounds: Normal breath sounds. No wheezing or rales. Abdominal:      General: Bowel sounds are normal.      Palpations: Abdomen is soft. Tenderness: There is no abdominal tenderness. There is no guarding or rebound. Musculoskeletal:      Cervical back: Normal range of motion and neck supple. No rigidity or tenderness. Skin:     General: Skin is warm and dry. Capillary Refill: Capillary refill takes less than 2 seconds. Neurological:      General: No focal deficit present. Mental Status: He is alert and oriented to person, place, and time. Cranial Nerves: No cranial nerve deficit.       Coordination: Coordination normal.          Procedures     Labs Reviewed   COVID-19, RAPID - Abnormal; Notable for the following components:       Result Value    SARS-CoV-2, NAAT DETECTED (*)     All other components within normal limits   BRAIN NATRIURETIC PEPTIDE - Abnormal; Notable for the following components:    Pro- (*)     All other components within normal limits   CBC WITH AUTO DIFFERENTIAL - Abnormal; Notable for the following components:    Neutrophils % 90.4 (*)     Lymphocytes % 7.0 (*)     Monocytes % 1.7 (*)     Lymphocytes Absolute 0.41 (*)     Eosinophils Absolute 0.00 (*)     All other components within normal limits   COMPREHENSIVE METABOLIC PANEL W/ REFLEX TO MG FOR LOW K - Abnormal; Notable for the following components:    Glucose 118 (*)     Calcium 8.4 (*)     Total Protein 5.9 (*)     Albumin 3.3 (*)     AST 61 (*)     All other components within normal limits   D-DIMER, QUANTITATIVE   LACTIC ACID, PLASMA   TROPONIN   C-REACTIVE PROTEIN   LACTATE DEHYDROGENASE   FERRITIN   FIBRINOGEN   PROCALCITONIN     CTA PULMONARY W CONTRAST   Final Result   1. There is no evidence of a pulmonary embolus   2. Multifocal bilateral ground-glass and semi solid pulmonary infiltrates   most consistent with COVID pneumonia. EKG #1:  I personally interpreted this EKG  Time:  1018    Rate: 79  Rhythm: Sinus. Interpretation: Normal sinus rhythm, normal axis, no ST elevation, T wave inversion in lead III      MDM  Number of Diagnoses or Management Options  Acute respiratory failure with hypoxia (HCC)  COVID  Diagnosis management comments: Patient is a 57-year male presents today for cough, shortness of breath and fatigue. He is noted to be hypoxic on arrival with O2 saturation of 87% at rest.  Patient was placed on nasal cannula. Her lungs are clear to auscultation. Labs and imaging obtained. Covid testing is positive. Troponin of 7, EKG shows no acute ischemic changes, unlikely ACS in etiology. CTA was obtained which shows no evidence of pulmonary embolism, does note bilateral multifocal groundglass opacities consistent with COVID-19 virus. With patient being hypoxic he was given Decadron, started on oxygen. Amount and/or Complexity of Data Reviewed  Clinical lab tests: reviewed  Tests in the radiology section of CPT®: reviewed                  --------------------------------------------- PAST HISTORY ---------------------------------------------  Past Medical History:  has no past medical history on file. Past Surgical History:  has a past surgical history that includes hernia repair; Tonsillectomy; and cyst removal (Left). Social History:  reports that he has never smoked.  He has never used smokeless tobacco. He reports that he does not drink alcohol and does not use drugs. Family History: family history is not on file. The patients home medications have been reviewed. Allergies: Patient has no known allergies.     -------------------------------------------------- RESULTS -------------------------------------------------    LABS:  Results for orders placed or performed during the hospital encounter of 10/03/21   COVID-19, Rapid    Specimen: Nasopharyngeal Swab   Result Value Ref Range    SARS-CoV-2, NAAT DETECTED (A) Not Detected   Brain Natriuretic Peptide   Result Value Ref Range    Pro- (H) 0 - 125 pg/mL   CBC Auto Differential   Result Value Ref Range    WBC 5.8 4.5 - 11.5 E9/L    RBC 4.93 3.80 - 5.80 E12/L    Hemoglobin 14.0 12.5 - 16.5 g/dL    Hematocrit 42.6 37.0 - 54.0 %    MCV 86.4 80.0 - 99.9 fL    MCH 28.4 26.0 - 35.0 pg    MCHC 32.9 32.0 - 34.5 %    RDW 13.8 11.5 - 15.0 fL    Platelets 498 840 - 177 E9/L    MPV 10.2 7.0 - 12.0 fL    Neutrophils % 90.4 (H) 43.0 - 80.0 %    Lymphocytes % 7.0 (L) 20.0 - 42.0 %    Monocytes % 1.7 (L) 2.0 - 12.0 %    Eosinophils % 0.0 0.0 - 6.0 %    Basophils % 0.3 0.0 - 2.0 %    Neutrophils Absolute 5.28 1.80 - 7.30 E9/L    Lymphocytes Absolute 0.41 (L) 1.50 - 4.00 E9/L    Monocytes Absolute 0.12 0.10 - 0.95 E9/L    Eosinophils Absolute 0.00 (L) 0.05 - 0.50 E9/L    Basophils Absolute 0.00 0.00 - 0.20 E9/L    Metamyelocytes Relative 0.9 0.0 - 1.0 %   Comprehensive Metabolic Panel w/ Reflex to MG   Result Value Ref Range    Sodium 137 132 - 146 mmol/L    Potassium reflex Magnesium 3.6 3.5 - 5.0 mmol/L    Chloride 105 98 - 107 mmol/L    CO2 23 22 - 29 mmol/L    Anion Gap 9 7 - 16 mmol/L    Glucose 118 (H) 74 - 99 mg/dL    BUN 14 6 - 20 mg/dL    CREATININE 0.7 0.7 - 1.2 mg/dL    GFR Non-African American >60 >=60 mL/min/1.73    GFR African American >60     Calcium 8.4 (L) 8.6 - 10.2 mg/dL    Total Protein 5.9 (L) 6.4 - 8.3 g/dL    Albumin 3.3 (L) 3.5 - 5.2 g/dL    Total Bilirubin 0.7 0.0 - 1.2 mg/dL Alkaline Phosphatase 43 40 - 129 U/L    ALT 37 0 - 40 U/L    AST 61 (H) 0 - 39 U/L   D-Dimer, Quantitative   Result Value Ref Range    D-Dimer, Quant 890 ng/mL DDU   Lactic Acid, Plasma   Result Value Ref Range    Lactic Acid 1.0 0.5 - 2.2 mmol/L   Troponin   Result Value Ref Range    Troponin, High Sensitivity 7 0 - 11 ng/L   EKG 12 Lead   Result Value Ref Range    Ventricular Rate 79 BPM    Atrial Rate 79 BPM    P-R Interval 156 ms    QRS Duration 100 ms    Q-T Interval 356 ms    QTc Calculation (Bazett) 408 ms    P Axis 51 degrees    R Axis -173 degrees    T Axis 9 degrees       RADIOLOGY:  CTA PULMONARY W CONTRAST   Final Result   1. There is no evidence of a pulmonary embolus   2. Multifocal bilateral ground-glass and semi solid pulmonary infiltrates   most consistent with COVID pneumonia.                 ------------------------- NURSING NOTES AND VITALS REVIEWED ---------------------------  Date / Time Roomed:  10/3/2021  9:56 AM  ED Bed Assignment:  06/06    The nursing notes within the ED encounter and vital signs as below have been reviewed. Patient Vitals for the past 24 hrs:   BP Temp Pulse Resp SpO2 Weight   10/03/21 1026 135/76 -- -- (!) 34 93 % 218 lb (98.9 kg)   10/03/21 0956 -- 97.1 °F (36.2 °C) 90 -- (!) 88 % --       Oxygen Saturation Interpretation: Abnormal    ------------------------------------------ PROGRESS NOTES ------------------------------------------    Counseling:  I have spoken with the patient and discussed todays results, in addition to providing specific details for the plan of care and counseling regarding the diagnosis and prognosis. Their questions are answered at this time and they are agreeable with the plan of admission.    --------------------------------- ADDITIONAL PROVIDER NOTES ---------------------------------  Consultations:  Spoke with Dr. Alma Garcia. Discussed case. They will admit the patient.   This patient's ED course included: a personal history and physicial examination    This patient has remained hemodynamically stable during their ED course. Medications   dexamethasone (DECADRON) injection 6 mg (has no administration in time range)   iopamidol (ISOVUE-370) 76 % injection 75 mL (75 mLs IntraVENous Given 10/3/21 4456)         Diagnosis:  1. Acute respiratory failure with hypoxia (HCC)    2. COVID        Disposition:  Patient's disposition: Admit to telemetry  Patient's condition is stable.              Jonnathan Alex, DO  Resident  10/03/21 9419

## 2021-10-03 NOTE — CONSULTS
10/4/2021] fluticasone (FLONASE) 50 MCG/ACT nasal spray 2 spray, 2 spray, Each Nostril, Daily, Fabian Morgan DO    zinc sulfate (ZINCATE) capsule 50 mg, 50 mg, Oral, Daily, Fabian Morgan DO, 50 mg at 10/03/21 1752    ascorbic acid (VITAMIN C) tablet 500 mg, 500 mg, Oral, Daily, Fabian Morgan DO, 500 mg at 10/03/21 1752    vitamin E capsule 400 Units, 400 Units, Oral, Daily, Carla Marko Morgan DO, 400 Units at 10/03/21 1752    budesonide-formoterol (SYMBICORT) 160-4.5 MCG/ACT inhaler 2 puff, 2 puff, Inhalation, BID, Fabian Morgan DO    albuterol-ipratropium (COMBIVENT RESPIMAT)  MCG/ACT inhaler 1 puff, 1 puff, Inhalation, Q6H, Fabian Morgan DO  ALLERGIES     Patient has no known allergies. Immunization History   Administered Date(s) Administered    Influenza Virus Vaccine 10/30/2014    Influenza, Quadv, IM, PF (6 mo and older Fluzone, Flulaval, Fluarix, and 3 yrs and older Afluria) 11/03/2017, 10/13/2020    Pneumococcal Polysaccharide ([id number]) 09/23/2015      Internal Administration   First Dose      Second Dose           Last COVID Lab SARS-CoV-2, NAAT (no units)   Date Value   10/03/2021 DETECTED (A)            PAST MEDICAL HISTORY   History reviewed. No pertinent past medical history. SURGICAL HISTORY       Past Surgical History:   Procedure Laterality Date    CYST REMOVAL Left     as kid ganglion    HERNIA REPAIR Left     Inguinal    TONSILLECTOMY       FAMILY HISTORY     History reviewed. No pertinent family history.   SOCIAL HISTORY       Social History     Socioeconomic History    Marital status:      Spouse name: None    Number of children: 0    Years of education: None    Highest education level: None   Occupational History    None   Tobacco Use    Smoking status: Never Smoker    Smokeless tobacco: Never Used   Vaping Use    Vaping Use: Never used   Substance and Sexual Activity    Alcohol use: No     Comment: beer once a year    Drug use: No    Sexual activity: Yes     Partners: Female   Other Topics Concern    None   Social History Narrative    None     Social Determinants of Health     Financial Resource Strain:     Difficulty of Paying Living Expenses:    Food Insecurity:     Worried About Running Out of Food in the Last Year:     [address] in the Last Year:    Transportation Needs:     Lack of Transportation (Medical):  Lack of Transportation (Non-Medical):    Physical Activity:     Days of Exercise per Week:     Minutes of Exercise per Session:    Stress:     Feeling of Stress :    Social Connections:     Frequency of Communication with Friends and Family:     Frequency of Social Gatherings with Friends and Family:     Attends [employer] Services:     Active Member of Clubs or Organizations:     Attends Club or Organization Meetings:     Marital Status:    Intimate Partner Violence:     Fear of Current or Ex-Partner:     Emotionally Abused:     Physically Abused:     Sexually Abused:      ·      PHYSICAL EXAM       ED Triage Vitals   BP Temp Temp Source Pulse Resp SpO2 Height Weight   10/03/21 1026 10/03/21 0956 10/03/21 1500 10/03/21 0956 10/03/21 1026 10/03/21 0956 -- 10/03/21 1026   135/76 97.1 °F (36.2 °C) Oral 90 (!) 34 (!) 88 %  218 lb (98.9 kg)     Vitals:    Vitals:    10/03/21 1026 10/03/21 1500 10/03/21 1545 10/03/21 1745   BP: 135/76  (!) 120/58    Pulse:   77    Resp: (!) 34  16    Temp:  98.5 °F (36.9 °C) 97.7 °F (36.5 °C)    TempSrc:  Oral     SpO2: 93%  96% 90%   Weight: 218 lb (98.9 kg)        Physical Exam   Constitutional/General: Alert and oriented, NAD  Head: NC/AT  Eyes: PERRL, EOMI  Mouth: Normal mucosa, no thrush   Neck: Supple, full ROM,    Pulmonary: Lungs RALESto auscultation bilaterally. Not in respiratory distress  Cardiovascular:  Regular rate and rhythm, no murmurs, gallops, or rubs. Abdomen: Soft, + BS.    distension. Nontender. Extremities: Moves all extremities x 4.    Warm and well perfused  Pulses:  Distal pulses intact  Skin: Warm and dry without rash  Neurologic:    No focal deficits  Psych: Normal Affect     DIAGNOSTIC RESULTS   RADIOLOGY:   XR CHEST (2 VW)    Result Date: 10/1/2021  EXAMINATION: TWO XRAY VIEWS OF THE CHEST 10/1/2021 4:16 pm COMPARISON: None. HISTORY: ORDERING SYSTEM PROVIDED HISTORY: cough TECHNOLOGIST PROVIDED HISTORY: Reason for exam:->cough FINDINGS: Cardiomediastinal silhouette within normal limits. Multifocal airspace disease throughout the lungs bilaterally most consistent with multifocal pneumonia. No evidence of pleural effusions. Pulmonary vasculature is within normal limits. Findings consistent with multifocal pneumonia. CTA PULMONARY W CONTRAST    Result Date: 10/3/2021  EXAMINATION: CTA OF THE CHEST 10/3/2021 11:45 am TECHNIQUE: CTA of the chest was performed after the administration of intravenous contrast.  Multiplanar reformatted images are provided for review. MIP images are provided for review. Dose modulation, iterative reconstruction, and/or weight based adjustment of the mA/kV was utilized to reduce the radiation dose to as low as reasonably achievable. COMPARISON: None. HISTORY: ORDERING SYSTEM PROVIDED HISTORY: Hypoxia, recent diagnosis of multifocal pneumonia TECHNOLOGIST PROVIDED HISTORY: Reason for exam:->Hypoxia, recent diagnosis of multifocal pneumonia Decision Support Exception - unselect if not a suspected or confirmed emergency medical condition->Emergency Medical Condition (MA) FINDINGS: Pulmonary Arteries: Pulmonary arteries are adequately opacified for evaluation. No evidence of intraluminal filling defect to suggest pulmonary embolism. Main pulmonary artery is normal in caliber. Mediastinum: No evidence of mediastinal lymphadenopathy. The heart and pericardium demonstrate no acute abnormality. There is no acute abnormality of the thoracic aorta.  Lungs/pleura: Multifocal bilateral ground-glass and semi solid pulmonary infiltrates are noted most consistent with COVID pneumonia. There is no pleural effusion or pleural thickening. Martha Jay Upper Abdomen: Limited images of the upper abdomen are unremarkable. Soft Tissues/Bones: No acute bone or soft tissue abnormality. 1. There is no evidence of a pulmonary embolus 2. Multifocal bilateral ground-glass and semi solid pulmonary infiltrates most consistent with COVID pneumonia. LABS  Recent Labs     10/01/21  1617 10/03/21  1021   WBC 6.6 5.8   HGB 16.0 14.0   HCT 49.5 42.6   MCV 87.3 86.4    193     Recent Labs     10/01/21  1617 10/01/21  1617 10/03/21  1021     --  137   K 4.5   < > 3.6      < > 105   CO2 27   < > 23   BUN 15  --  14   CREATININE 1.0  --  0.7   GFRAA >60  --  >60   LABGLOM >60  --  >60   GLUCOSE 122*  --  118*   PROT 7.1  --  5.9*   LABALBU 4.1  --  3.3*   CALCIUM 8.6  --  8.4*   BILITOT 0.7  --  0.7   ALKPHOS 54  --  43   AST 55*  --  61*   ALT 36  --  37    < > = values in this interval not displayed. No results for input(s): PROCAL in the last 72 hours.   Lab Results   Component Value Date    CRP 7.6 (H) 10/03/2021     No results found for: SEDRATE  No results found for: [id number]  Lab Results   Component Value Date    COVID19 DETECTED 10/03/2021     COVID-19/ADITI-COV2 LABS  Recent Labs     10/01/21  1617 10/03/21  1021 10/03/21  1410   CRP  --  7.6*  --    LDH  --   --  444*   DDIMER  --  890  --    FIBRINOGEN  --   --  688*   AST 55* 61*  --    ALT 36 37  --      Lab Results   Component Value Date    CHOL 189 09/17/2021    TRIG 84 09/17/2021    HDL 41 09/17/2021    LDLCALC 131 09/17/2021    LABVLDL 17 09/17/2021         Lab Results   Component Value Date    HCVABI Non-Reactive 02/03/2020     Hep C Ab Interp   Date Value Ref Range Status   02/03/2020 Non-Reactive NON REACT Final        MICROBIOLOGY:          FINAL IMPRESSION    Patient is a 61 y.o. male who presented with   Chief Complaint   Patient presents with    Shortness of Breath pending Covid results from [address] [place] [address] Drive. , done at [place] [facility]    Fatigue     with generalized weakness    and admitted for Acute respiratory failure with hypoxia ([facility] [address] [J96.01]  COVID [U07.1] pneumonia      pharmacy consulted for optimizing covid rx      enoxaparin (LOVENOX) injection 30 mg, BID  [START ON 10/4/2021] dexamethasone (DECADRON) injection 6 mg, Q24H  zinc sulfate (ZINCATE) capsule 50 mg, Daily  ascorbic acid (VITAMIN C) tablet 500 mg, Daily  vitamin E capsule 400 Units, Daily    DISUCSSED IMPORTANCE OF PRONING/IS       Imaging and labs were reviewed per medical records and any ID pertinent labs were addressed with the patient. The patient/FAMILY  was educated about the diagnosis, prognosis, indications, risks and benefits of treatment. An opportunity to ask questions was given to the patient/FAMILY and questions were answered. The patient was evaluated during the [facility] 19 pandemic, and the diagnosis was suspected upon their initial presentation. Their evaluation, treatment and testing was consistent with this facility current covid-19 protocol.guidelines. . It should be noted that this is an evolving field and there have been multiple changes in recommendations over the past few months. We provided care with updated recommendations to the best possible; with good intention; in best interest of the patient. Thank you for involving me in the care of Lea Hernandez. Please do not hesitate to call for any questions or concerns.          Electronically signed by Elmer Wilson MD on 10/3/2021 at 8:10 PM

## 2021-10-03 NOTE — ED NOTES
Report called, patient talked to wife and she is aware of plan.       Amanda Cummings RN  10/03/21 1767

## 2021-10-04 LAB
ALBUMIN SERPL-MCNC: 3.1 G/DL (ref 3.5–5.2)
ALP BLD-CCNC: 44 U/L (ref 40–129)
ALT SERPL-CCNC: 36 U/L (ref 0–40)
ANION GAP SERPL CALCULATED.3IONS-SCNC: 9 MMOL/L (ref 7–16)
APTT: 28.4 SEC (ref 24.5–35.1)
AST SERPL-CCNC: 53 U/L (ref 0–39)
BASOPHILS ABSOLUTE: 0.01 E9/L (ref 0–0.2)
BASOPHILS ABSOLUTE: 0.01 E9/L (ref 0–0.2)
BASOPHILS RELATIVE PERCENT: 0.1 % (ref 0–2)
BASOPHILS RELATIVE PERCENT: 0.2 % (ref 0–2)
BILIRUB SERPL-MCNC: 0.5 MG/DL (ref 0–1.2)
BUN BLDV-MCNC: 15 MG/DL (ref 6–20)
C-REACTIVE PROTEIN: 8.6 MG/DL (ref 0–0.4)
CALCIUM SERPL-MCNC: 8.4 MG/DL (ref 8.6–10.2)
CHLORIDE BLD-SCNC: 106 MMOL/L (ref 98–107)
CO2: 24 MMOL/L (ref 22–29)
CREAT SERPL-MCNC: 0.7 MG/DL (ref 0.7–1.2)
D DIMER: 780 NG/ML DDU
EKG ATRIAL RATE: 79 BPM
EKG P AXIS: 51 DEGREES
EKG P-R INTERVAL: 156 MS
EKG Q-T INTERVAL: 356 MS
EKG QRS DURATION: 100 MS
EKG QTC CALCULATION (BAZETT): 408 MS
EKG R AXIS: -173 DEGREES
EKG T AXIS: 9 DEGREES
EKG VENTRICULAR RATE: 79 BPM
EOSINOPHILS ABSOLUTE: 0 E9/L (ref 0.05–0.5)
EOSINOPHILS ABSOLUTE: 0 E9/L (ref 0.05–0.5)
EOSINOPHILS RELATIVE PERCENT: 0 % (ref 0–6)
EOSINOPHILS RELATIVE PERCENT: 0 % (ref 0–6)
FERRITIN: 739 NG/ML
FIBRINOGEN: >700 MG/DL (ref 225–540)
FIBRINOGEN: >700 MG/DL (ref 225–540)
GFR AFRICAN AMERICAN: >60
GFR NON-AFRICAN AMERICAN: >60 ML/MIN/1.73
GLUCOSE BLD-MCNC: 137 MG/DL (ref 74–99)
HCT VFR BLD CALC: 42.3 % (ref 37–54)
HCT VFR BLD CALC: 44.4 % (ref 37–54)
HEMOGLOBIN: 14 G/DL (ref 12.5–16.5)
HEMOGLOBIN: 14.7 G/DL (ref 12.5–16.5)
IMMATURE GRANULOCYTES #: 0.03 E9/L
IMMATURE GRANULOCYTES #: 0.05 E9/L
IMMATURE GRANULOCYTES %: 0.6 % (ref 0–5)
IMMATURE GRANULOCYTES %: 0.6 % (ref 0–5)
INR BLD: 1.2
LACTATE DEHYDROGENASE: 426 U/L (ref 135–225)
LACTIC ACID: 1.1 MMOL/L (ref 0.5–2.2)
LYMPHOCYTES ABSOLUTE: 0.78 E9/L (ref 1.5–4)
LYMPHOCYTES ABSOLUTE: 0.89 E9/L (ref 1.5–4)
LYMPHOCYTES RELATIVE PERCENT: 10.2 % (ref 20–42)
LYMPHOCYTES RELATIVE PERCENT: 15.6 % (ref 20–42)
MCH RBC QN AUTO: 28.2 PG (ref 26–35)
MCH RBC QN AUTO: 28.5 PG (ref 26–35)
MCHC RBC AUTO-ENTMCNC: 33.1 % (ref 32–34.5)
MCHC RBC AUTO-ENTMCNC: 33.1 % (ref 32–34.5)
MCV RBC AUTO: 85.3 FL (ref 80–99.9)
MCV RBC AUTO: 86 FL (ref 80–99.9)
MONOCYTES ABSOLUTE: 0.24 E9/L (ref 0.1–0.95)
MONOCYTES ABSOLUTE: 0.27 E9/L (ref 0.1–0.95)
MONOCYTES RELATIVE PERCENT: 3.1 % (ref 2–12)
MONOCYTES RELATIVE PERCENT: 4.8 % (ref 2–12)
NEUTROPHILS ABSOLUTE: 3.94 E9/L (ref 1.8–7.3)
NEUTROPHILS ABSOLUTE: 7.5 E9/L (ref 1.8–7.3)
NEUTROPHILS RELATIVE PERCENT: 78.8 % (ref 43–80)
NEUTROPHILS RELATIVE PERCENT: 86 % (ref 43–80)
PDW BLD-RTO: 13.6 FL (ref 11.5–15)
PDW BLD-RTO: 13.7 FL (ref 11.5–15)
PLATELET # BLD: 227 E9/L (ref 130–450)
PLATELET # BLD: 272 E9/L (ref 130–450)
PMV BLD AUTO: 10.4 FL (ref 7–12)
PMV BLD AUTO: 10.5 FL (ref 7–12)
POTASSIUM REFLEX MAGNESIUM: 3.9 MMOL/L (ref 3.5–5)
PROTHROMBIN TIME: 14.2 SEC (ref 9.3–12.4)
RBC # BLD: 4.96 E12/L (ref 3.8–5.8)
RBC # BLD: 5.16 E12/L (ref 3.8–5.8)
SODIUM BLD-SCNC: 139 MMOL/L (ref 132–146)
TOTAL PROTEIN: 5.9 G/DL (ref 6.4–8.3)
TROPONIN, HIGH SENSITIVITY: <6 NG/L (ref 0–11)
VITAMIN D 25-HYDROXY: 20 NG/ML (ref 30–100)
WBC # BLD: 5 E9/L (ref 4.5–11.5)
WBC # BLD: 8.7 E9/L (ref 4.5–11.5)

## 2021-10-04 PROCEDURE — 6370000000 HC RX 637 (ALT 250 FOR IP): Performed by: STUDENT IN AN ORGANIZED HEALTH CARE EDUCATION/TRAINING PROGRAM

## 2021-10-04 PROCEDURE — 6370000000 HC RX 637 (ALT 250 FOR IP): Performed by: INTERNAL MEDICINE

## 2021-10-04 PROCEDURE — 82306 VITAMIN D 25 HYDROXY: CPT

## 2021-10-04 PROCEDURE — 85730 THROMBOPLASTIN TIME PARTIAL: CPT

## 2021-10-04 PROCEDURE — 82728 ASSAY OF FERRITIN: CPT

## 2021-10-04 PROCEDURE — 85378 FIBRIN DEGRADE SEMIQUANT: CPT

## 2021-10-04 PROCEDURE — 83605 ASSAY OF LACTIC ACID: CPT

## 2021-10-04 PROCEDURE — 94640 AIRWAY INHALATION TREATMENT: CPT

## 2021-10-04 PROCEDURE — 93010 ELECTROCARDIOGRAM REPORT: CPT | Performed by: INTERNAL MEDICINE

## 2021-10-04 PROCEDURE — 2580000003 HC RX 258: Performed by: INTERNAL MEDICINE

## 2021-10-04 PROCEDURE — 2060000000 HC ICU INTERMEDIATE R&B

## 2021-10-04 PROCEDURE — 86140 C-REACTIVE PROTEIN: CPT

## 2021-10-04 PROCEDURE — 85610 PROTHROMBIN TIME: CPT

## 2021-10-04 PROCEDURE — 85384 FIBRINOGEN ACTIVITY: CPT

## 2021-10-04 PROCEDURE — 85025 COMPLETE CBC W/AUTO DIFF WBC: CPT

## 2021-10-04 PROCEDURE — 80053 COMPREHEN METABOLIC PANEL: CPT

## 2021-10-04 PROCEDURE — 6360000002 HC RX W HCPCS: Performed by: INTERNAL MEDICINE

## 2021-10-04 PROCEDURE — 84484 ASSAY OF TROPONIN QUANT: CPT

## 2021-10-04 PROCEDURE — 36415 COLL VENOUS BLD VENIPUNCTURE: CPT

## 2021-10-04 PROCEDURE — 83615 LACTATE (LD) (LDH) ENZYME: CPT

## 2021-10-04 RX ORDER — VITAMIN E 268 MG
400 CAPSULE ORAL DAILY
Status: DISCONTINUED | OUTPATIENT
Start: 2021-10-05 | End: 2021-10-06 | Stop reason: HOSPADM

## 2021-10-04 RX ADMIN — BARICITINIB 4 MG: 2 TABLET, FILM COATED ORAL at 09:34

## 2021-10-04 RX ADMIN — ZINC SULFATE 220 MG (50 MG) CAPSULE 50 MG: CAPSULE at 09:33

## 2021-10-04 RX ADMIN — BUDESONIDE AND FORMOTEROL FUMARATE DIHYDRATE 2 PUFF: 160; 4.5 AEROSOL RESPIRATORY (INHALATION) at 19:25

## 2021-10-04 RX ADMIN — Medication 5 ML: at 09:34

## 2021-10-04 RX ADMIN — FINASTERIDE 5 MG: 5 TABLET, FILM COATED ORAL at 09:33

## 2021-10-04 RX ADMIN — Medication 10 ML: at 22:16

## 2021-10-04 RX ADMIN — ENOXAPARIN SODIUM 30 MG: 30 INJECTION SUBCUTANEOUS at 22:17

## 2021-10-04 RX ADMIN — OXYCODONE HYDROCHLORIDE AND ACETAMINOPHEN 500 MG: 500 TABLET ORAL at 09:33

## 2021-10-04 RX ADMIN — Medication 400 UNITS: at 09:33

## 2021-10-04 RX ADMIN — FLUTICASONE PROPIONATE 2 SPRAY: 50 SPRAY, METERED NASAL at 09:34

## 2021-10-04 RX ADMIN — DEXAMETHASONE SODIUM PHOSPHATE 6 MG: 10 INJECTION INTRAMUSCULAR; INTRAVENOUS at 09:34

## 2021-10-04 RX ADMIN — ENOXAPARIN SODIUM 30 MG: 30 INJECTION SUBCUTANEOUS at 09:34

## 2021-10-04 RX ADMIN — IPRATROPIUM BROMIDE AND ALBUTEROL 1 PUFF: 20; 100 SPRAY, METERED RESPIRATORY (INHALATION) at 19:25

## 2021-10-04 ASSESSMENT — ENCOUNTER SYMPTOMS: SHORTNESS OF BREATH: 1

## 2021-10-04 NOTE — PROGRESS NOTES
[facility]     [address] [address]  [place], [address]  Phone [phone]   [phone]      Admit Date: 10/3/2021  9:56 AM  Pt Name: Elizabeth Ojeda  MRN: [id number]  : 1965  Reason for Consult:    Chief Complaint   Patient presents with    Shortness of Breath     pending Covid results from [address] , done at [facility] aid    Fatigue     with generalized weakness     Requesting Physician:  Tim Browne DO  PCP: Yumi Morales DO  History Obtained From:  patient, chart   ID consulted for Acute respiratory failure with hypoxia ([facility] [address] [J96.01]  COVID [U07.1]  on hospital day [address]       Chief Complaint   Patient presents with    Shortness of Breath     pending Covid results from [address] , done at [facility] aid    Fatigue     with generalized weakness     HISTORYOF PRESENT ILLNESS   Elizabeth Ojeda is a 61 y.o. male who presents with   has no past medical history on file. ED TRIAGEVITALS  BP: 123/70, Temp: 97.2 °F (36.2 °C), Pulse: 67, Resp: 22, SpO2: 92 %  HPI  Pt came in with c/o cough. diarrhea/vomiting on 10/1  Pt ha sx for 3 days PTA  Wbc6.6 cr1 lactic acid2.8  cxry multifocal pneumonia   Pt was d/c to home  Pt comes in today with SOB/covid +  cills/fatigue/cough/sob/chst pain /nausea  UNVACCINATED   o2 sat 88%on RA  cta 1. There is no evidence of a pulmonary embolus   2. Multifocal bilateral ground-glass and semi solid pulmonary infiltrates   most consistent with COVID pneumonia.    Afebrile wbc5.8 cr0.7  PT LIVES WITH WIFE   HE HAS GUESTS AT HIS HOUSE  UNSURE OF  HIS EXPOSURE  HE WORKS A FACTORY  PT TOOK HIS O2 OFF TO EAT O2 SAT 85% PLACED BACKON 5L     CURRENT VISIT  10/4/2021  PT IN BED USING IS HE AH'S NO C/O F/C/N/V/D    REVIEW OF SYSTEMS     CONSTITUTIONAL:   No fever, chills, weight loss  ALLERGIES:    No urticaria, hay fever,    EYES:     No blurry vision, loss of vision, eye pain  ENT:      No hearing loss, sore throat  CARDIOVASCULAR:   No chest pain or palpitations  RESPIRATORY:     cough, sob  ENDOCRINE:    No increase thirst, urination   HEME-LYMPH:   No easy bruising or bleeding  GI:      nausea, vomiting or diarrhea  :     No urinary complaints  NEURO:    No seizures, stroke, HA  MUSCULOSKELETAL:  No muscle aches or pain, no joint pain  SKIN:     No rash or itch  PSYCH:    No depression or anxiety    Medications Prior to Admission: doxycycline hyclate (VIBRA-TABS) 100 MG tablet, Take 1 tablet by mouth 2 times daily for 10 days  benzonatate (TESSALON) 100 MG capsule, Take 1 capsule by mouth 2 times daily as needed for Cough  finasteride (PROSCAR) 5 MG tablet, Take 1 tablet by mouth daily  fluticasone (FLONASE) 50 MCG/ACT nasal spray, 2 sprays by Each Nostril route daily  ciclopirox (PENLAC) 8 % solution, Apply topically daily to affected nails.   CURRENT MEDICATIONS     Current Facility-Administered Medications:     sodium chloride flush 0.9 % injection 5-40 mL, 5-40 mL, IntraVENous, 2 times per day, Fabian Morgan DO, 5 mL at 10/04/21 0934    sodium chloride flush 0.9 % injection 5-40 mL, 5-40 mL, IntraVENous, PRN, Fabian Morgan DO    0.9 % sodium chloride infusion, 25 mL, IntraVENous, PRN, Fabian Morgan DO    enoxaparin (LOVENOX) injection 30 mg, 30 mg, SubCUTAneous, BID, Fabian Morgan DO, 30 mg at 10/04/21 0934    ondansetron (ZOFRAN-ODT) disintegrating tablet 4 mg, 4 mg, Oral, Q8H PRN **OR** ondansetron (ZOFRAN) injection 4 mg, 4 mg, IntraVENous, Q6H PRN, Fabian Morgan DO    polyethylene glycol (GLYCOLAX) packet 17 g, 17 g, Oral, Daily PRN, Nas Morgan DO    acetaminophen (TYLENOL) tablet 650 mg, 650 mg, Oral, Q6H PRN **OR** acetaminophen (TYLENOL) suppository 650 mg, 650 mg, Rectal, Q6H PRN, Fabian Morgan DO    dexamethasone (DECADRON) injection 6 mg, 6 mg, IntraVENous, Q24H, Fabian Morgan DO, 6 mg at 10/04/21 0934    benzonatate (TESSALON) capsule 100 mg, 100 mg, Oral, BID PRN, Nas Leigh DO Eddie    finasteride (PROSCAR) tablet 5 mg, 5 mg, Oral, Daily, Fabian Morgan DO, 5 mg at 10/04/21 0933    fluticasone (FLONASE) 50 MCG/ACT nasal spray 2 spray, 2 spray, Each Nostril, Daily, Fabian Morgan DO, 2 spray at 10/04/21 0934    zinc sulfate (ZINCATE) capsule 50 mg, 50 mg, Oral, Daily, Fabian Morgan DO, 50 mg at 10/04/21 0933    ascorbic acid (VITAMIN C) tablet 500 mg, 500 mg, Oral, Daily, Fabian Morgan DO, 500 mg at 10/04/21 6570    vitamin E capsule 400 Units, 400 Units, Oral, Daily, Radha Nguyen DO Eddie, 400 Units at 10/04/21 0933    budesonide-formoterol (SYMBICORT) 160-4.5 MCG/ACT inhaler 2 puff, 2 puff, Inhalation, BID, Fabian Morgan DO    albuterol-ipratropium (COMBIVENT RESPIMAT)  MCG/ACT inhaler 1 puff, 1 puff, Inhalation, Q6H, Fabian Morgan DO    baricitinib (OLUMIANT) tablet 4 mg, 4 mg, Oral, Daily, Tavo Short DO, 4 mg at 10/04/21 2997  ALLERGIES     Patient has no known allergies. Immunization History   Administered Date(s) Administered    Influenza Virus Vaccine 10/30/2014    Influenza, Quadv, IM, PF (6 mo and older Fluzone, Flulaval, Fluarix, and 3 yrs and older Afluria) 11/03/2017, 10/13/2020    Pneumococcal Polysaccharide ([id number]) 09/23/2015      Internal Administration   First Dose      Second Dose           Last COVID Lab SARS-CoV-2, NAAT (no units)   Date Value   10/03/2021 DETECTED (A)            PAST MEDICAL HISTORY   History reviewed. No pertinent past medical history. SURGICAL HISTORY       Past Surgical History:   Procedure Laterality Date    CYST REMOVAL Left     as kid ganglion    HERNIA REPAIR Left     Inguinal    TONSILLECTOMY       FAMILY HISTORY     History reviewed. No pertinent family history.   ·      PHYSICAL EXAM          Vitals:    10/03/21 1545 10/03/21 1745 10/04/21 0815 10/04/21 1300   BP: (!) 120/58  123/70    Pulse: 77  67    Resp: 16  22    Temp: 97.7 °F (36.5 °C)  97.2 °F (36.2 °C)    TempSrc:   Infrared    SpO2: 96% 90% 91% 92%   Weight:         Physical Exam   Constitutional/General: Alert and oriented, NAD  Head: NC/AT  Eyes: PERRL, EOMI  Pulmonary: Lungs RALES to auscultation bilaterally POST . Not in respiratory distress  Cardiovascular:  Regular rate and rhythm,    Abdomen: Soft, + BS.    distension. Nontender. Extremities: Moves all extremities x 4. Pulses:  Distal pulses intact  Skin: Warm and dry without rash  Neurologic:    No focal deficits  Psych: Normal Affect     DIAGNOSTIC RESULTS   RADIOLOGY:   XR CHEST (2 VW)    Result Date: 10/1/2021  EXAMINATION: TWO XRAY VIEWS OF THE CHEST 10/1/2021 4:16 pm COMPARISON: None. HISTORY: ORDERING SYSTEM PROVIDED HISTORY: cough TECHNOLOGIST PROVIDED HISTORY: Reason for exam:->cough FINDINGS: Cardiomediastinal silhouette within normal limits. Multifocal airspace disease throughout the lungs bilaterally most consistent with multifocal pneumonia. No evidence of pleural effusions. Pulmonary vasculature is within normal limits. Findings consistent with multifocal pneumonia. CTA PULMONARY W CONTRAST    Result Date: 10/3/2021  EXAMINATION: CTA OF THE CHEST 10/3/2021 11:45 am TECHNIQUE: CTA of the chest was performed after the administration of intravenous contrast.  Multiplanar reformatted images are provided for review. MIP images are provided for review. Dose modulation, iterative reconstruction, and/or weight based adjustment of the mA/kV was utilized to reduce the radiation dose to as low as reasonably achievable. COMPARISON: None. HISTORY: ORDERING SYSTEM PROVIDED HISTORY: Hypoxia, recent diagnosis of multifocal pneumonia TECHNOLOGIST PROVIDED HISTORY: Reason for exam:->Hypoxia, recent diagnosis of multifocal pneumonia Decision Support Exception - unselect if not a suspected or confirmed emergency medical condition->Emergency Medical Condition (MA) FINDINGS: Pulmonary Arteries: Pulmonary arteries are adequately opacified for evaluation.   No evidence of intraluminal filling defect to suggest pulmonary embolism. Main pulmonary artery is normal in caliber. Mediastinum: No evidence of mediastinal lymphadenopathy. The heart and pericardium demonstrate no acute abnormality. There is no acute abnormality of the thoracic aorta. Lungs/pleura: Multifocal bilateral ground-glass and semi solid pulmonary infiltrates are noted most consistent with COVID pneumonia. There is no pleural effusion or pleural thickening. Kiera Remedies Upper Abdomen: Limited images of the upper abdomen are unremarkable. Soft Tissues/Bones: No acute bone or soft tissue abnormality. 1. There is no evidence of a pulmonary embolus 2. Multifocal bilateral ground-glass and semi solid pulmonary infiltrates most consistent with COVID pneumonia. LABS  Recent Labs     10/03/21  1021 10/04/21  0447 10/04/21  1327   WBC 5.8 5.0 8.7   HGB 14.0 14.0 14.7   HCT 42.6 42.3 44.4   MCV 86.4 85.3 86.0    227 272     Recent Labs     10/03/21  1021 10/03/21  1021 10/04/21  0447     --  139   K 3.6   < > 3.9      < > 106   CO2 23   < > 24   BUN 14  --  15   CREATININE 0.7  --  0.7   GFRAA >60  --  >60   LABGLOM >60  --  >60   GLUCOSE 118*  --  137*   PROT 5.9*  --  5.9*   LABALBU 3.3*  --  3.1*   CALCIUM 8.4*  --  8.4*   BILITOT 0.7  --  0.5   ALKPHOS 43  --  44   AST 61*  --  53*   ALT 37  --  36    < > = values in this interval not displayed.      Recent Labs     10/03/21  1410 10/03/21  1649   PROCAL 0.19* 0.19*     Lab Results   Component Value Date    CRP 8.6 (H) 10/04/2021    CRP 7.6 (H) 10/03/2021        Lab Results   Component Value Date    COVID19 DETECTED 10/03/2021     COVID-19/ADITI-COV2 LABS  Recent Labs     10/03/21  1021 10/03/21  1410 10/03/21  1649 10/04/21  0447 10/04/21  1327   CRP 7.6*  --   --  8.6*  --    PROCAL  --  0.19* 0.19*  --   --    FERRITIN  --  720  --  739  --    LDH  --  444*  --  426*  --    DDIMER 890  --   --  780  --    FIBRINOGEN  --  688*  --  >700* >700*   INR  --   --   --  1.2  -- PROTIME  --   --   --  14.2*  --    AST 61*  --   --  53*  --    ALT 37  --   --  36  --      Lab Results   Component Value Date    CHOL 189 09/17/2021    TRIG 84 09/17/2021    HDL 41 09/17/2021    LDLCALC 131 09/17/2021    LABVLDL 17 09/17/2021     Lab Results   Component Value Date    HCVABI Non-Reactive 02/03/2020     Hep C Ab Interp   Date Value Ref Range Status   02/03/2020 Non-Reactive NON REACT Final        MICROBIOLOGY:       FINAL IMPRESSION    Patient is a 61 y.o. male who presented with   Chief Complaint   Patient presents with    Shortness of Breath     pending Covid results from [address] , done at [facility] aid    Fatigue     with generalized weakness    and admitted for Acute respiratory failure with hypoxia (HCC) [J96.01]  COVID [U07.1] pneumonia        enoxaparin (LOVENOX) injection 30 mg, BID     dexamethasone (DECADRON) injection 6 mg, Q24H     baricitinib (OLUMIANT) tablet 4 mg, Daily    FOLLOW LABS    DISUCSSED IMPORTANCE OF PRONING/IS       Imaging and labs were reviewed per medical records and any ID pertinent labs were addressed with the patient. The patient/FAMILY  was educated about the diagnosis, prognosis, indications, risks and benefits of treatment. An opportunity to ask questions was given to the patient/FAMILY and questions were answered. Thank you for involving me in the care of Kris Dahl. Please do not hesitate to call for any questions or concerns.          Electronically signed by Marlon Sin MD on 10/4/2021 at 5:45 PM

## 2021-10-04 NOTE — PROGRESS NOTES
Internal Medicine Progress Note    [facility]    Ana Harvey. Sara Pinzon., BRYANOIsaiahI. Elie Madrid D.O., ELADIO Rollins, MSN, APRN, NP-C  Cheo Tellez, MSN, APRN-CNP     Primary Care Physician: Partha Begum DO   Admitting Physician:  Marquis Parks DO  Admission date and time: 10/3/2021  9:56 AM    Room:  20/0620-01  Admitting diagnosis: Acute respiratory failure with hypoxia [facility]) [J96.01]  COVID [U07.1]    Patient Name: Kris Dahl  MRN: [id number]    Date of Service: 10/4/2021     Subjective:  Juliana Frias is a 61 y.o. male who was seen and examined today,10/4/2021, at the bedside. Juliana Frias has been weaned to 4 L of nasal cannula oxygen and seems more comfortable today. He continues to exhibit exertional dyspnea. We discussed the need to utilize the incentive spirometer more frequently. We stressed the importance of early ambulation. He is maintained on maximal Covid protocol. We discussed the perils associated with being unvaccinated. Review of System:   Constitutional:   Admits to ongoing, but improving generalized malaise and fatigue. HEENT:   Denies ear pain, sore throat, sinus or eye problems. Admits to mild irritation associated with the nasal cannula oxygen. Cardiovascular:   Denies any chest pain, irregular heartbeats, or palpitations. Respiratory:   Admits to shortness of breath that is most noticeable with exertion. Gastrointestinal:   Admits to improving abdominal symptomatology. Genitourinary:    Denies any urgency, frequency, hematuria. Voiding  without difficulty. Extremities:   Denies lower extremity swelling, edema or cyanosis. Neurology:    Denies any headache or focal neurological deficits, Denies generalized weakness or memory difficulty. Psch:   Denies being anxious or depressed. Musculoskeletal:    Denies  myalgias, joint complaints or back pain.    Integumentary:   Denies any rashes, ulcers, or excoriations. Denies bruising. Hematologic/Lymphatic:  Denies bruising or bleeding. Physical Exam:  I/O this shift:  In: 180 [P.O.:180]  Out: -     Intake/Output Summary (Last 24 hours) at 10/4/2021 1316  Last data filed at 10/4/2021 0921  Gross per 24 hour   Intake 180 ml   Output --   Net 180 ml   No intake/output data recorded. Patient Vitals for the past 96 hrs (Last 3 readings):   Weight   10/03/21 1026 218 lb (98.9 kg)     Vital Signs:   Blood pressure 123/70, pulse 67, temperature 97.2 °F (36.2 °C), temperature source Infrared, resp. rate 22, weight 218 lb (98.9 kg), SpO2 91 %. General appearance:  Alert, responsive, oriented to person, place, and time. Well preserved, alert, no distress. Head:  Normocephalic. No masses, lesions or tenderness. Eyes:  PERRLA. EOMI. Sclera clear. Buccal mucosa moist.  ENT:  Ears normal. Mucosa normal.  4 L of nasal cannula oxygen are in place. Neck:    Supple. Trachea midline. No thyromegaly. No JVD. No bruits. Heart:    Rhythm regular. Rate controlled. No murmurs. Lungs:    Symmetrical. Clear to auscultation bilaterally. No wheezes. No rhonchi. No rales. Abdomen:   Soft. Non-tender. Non-distended. Bowel sounds positive. No organomegaly or masses. No pain on palpation. Extremities:    Peripheral pulses present. No peripheral edema. No ulcers. No cyanosis. No clubbing. Neurologic:    Alert x 3. No focal deficit. Cranial nerves grossly intact. No focal weakness. Psych:   Behavior is normal. Mood appears normal. Speech is not rapid and/or pressured. Musculoskeletal:   Spine ROM normal. Muscular strength intact. Gait not assessed. Integumentary:  No rashes  Skin normal color and texture.   Genitalia/Breast:  Deferred    Medication:  Scheduled Meds:   sodium chloride flush  5-40 mL IntraVENous 2 times per day    enoxaparin  30 mg SubCUTAneous BID    dexamethasone  6 mg IntraVENous Q24H    finasteride  5 mg Oral Daily    fluticasone  2 spray Each Nostril Daily    zinc sulfate  50 mg Oral Daily    ascorbic acid  500 mg Oral Daily    vitamin E  400 Units Oral Daily    budesonide-formoterol  2 puff Inhalation BID    albuterol-ipratropium  1 puff Inhalation Q6H    baricitinib  4 mg Oral Daily     Continuous Infusions:   sodium chloride         Objective Data:  CBC with Differential:    Lab Results   Component Value Date    WBC 5.0 10/04/2021    RBC 4.96 10/04/2021    HGB 14.0 10/04/2021    HCT 42.3 10/04/2021     10/04/2021    MCV 85.3 10/04/2021    MCH 28.2 10/04/2021    MCHC 33.1 10/04/2021    RDW 13.6 10/04/2021    METASPCT 0.9 10/03/2021    LYMPHOPCT 15.6 10/04/2021    MONOPCT 4.8 10/04/2021    BASOPCT 0.2 10/04/2021    MONOSABS 0.24 10/04/2021    LYMPHSABS 0.78 10/04/2021    EOSABS 0.00 10/04/2021    BASOSABS 0.01 10/04/2021     BMP:    Lab Results   Component Value Date     10/04/2021    K 3.9 10/04/2021     10/04/2021    CO2 24 10/04/2021    BUN 15 10/04/2021    LABALBU 3.1 10/04/2021    LABALBU 4.4 10/18/2010    CREATININE 0.7 10/04/2021    CALCIUM 8.4 10/04/2021    GFRAA >60 10/04/2021    LABGLOM >60 10/04/2021    GLUCOSE 137 10/04/2021    GLUCOSE 104 10/18/2010       Assessment:  1. Sepsis secondary to Covid pneumonia resulting in acute respiratory failure with hypoxia in an unvaccinated patient    Plan:   The patient's respiratory status seems to be improving and he has now been weaned to 4 L of nasal cannula oxygen. We will continue with aggressive weaning. Maximal Covid protocol is being employed. We discussed the absolute need to utilize the incentive spirometer more frequently. We also encouraged frequent repositioning. Continue current therapy. See orders for further plan of care. Greater than 40 minutes of critical care time was spent with the patient. This time included chart review, , and discussion with those consultants involved in the patient's care.     More than 50% of my  time was spent at the bedside counseling/coordinating care with the patient and/or family with face to face contact. This time was spent reviewing notes and laboratory data as well as instructing and counseling the patient. Time I spent with the family or surrogate(s) is included only if the patient was incapable of providing the necessary information or participating in medical decisions. I also discussed the differential diagnosis and all of the proposed management plans with the patient and individuals accompanying the patient. Satish Ferreira requires this high level of physician care and nursing on the IMC/Telemetry unit due the complexity of decision management and chance of rapid decline or death. Continued cardiac monitoring and higher level of nursing are required. I am readily available for any further decision-making and intervention.      Cely Nolen DO, F.A.C.O.I.  10/4/2021  1:16 PM

## 2021-10-04 NOTE — CARE COORDINATION
CM Note: 10/4/2021 at 9:33am: COVID POSITIVE - test done on 10/3. CM called and talked to the patient on the phone in his room. States he lives with his wife in a ranch home and is independent with his ADL's and is very active - bicycling at times up to 31 miles. Currently on 4L NC - no hx of home O2 or nebulizer. DME: none. No hx of HHC or SNF. PCP: Dr. Rob Anthony. Pharmacy: [facility] in [place]. States his plan is to return home when discharged and his wife will provide transportation.  Adriel Lomeli RN

## 2021-10-04 NOTE — PROGRESS NOTES
Covid-19 Management Note      Pharmacy consulted by Dr Tigist Guardado to initiate remdesivir or baricitinib in 60 yo covid+ M per facility protocol. Patient currently requiring 3 L/min NC and has elevation of biomarkers including a CRP of 7.6. Patient meets criteria for the initiation of baricitinib therapy. Pharmacy will begin treatment course of baricitinib 4 mg daily for 14 days.       Thank you for this consult,    Helena Sloan, PharmD 10/3/2021 10:15 PM   [phone]

## 2021-10-04 NOTE — ACP (ADVANCE CARE PLANNING)
Advance Care Planning     Advance Care Planning Activator (Inpatient)  Conversation Note      Date of ACP Conversation: 10/4/2021     Conversation Conducted with: Patient with Decision Making Capacity  ACP Activator: Alicia Jason RN    Health Care Decision Maker:     Current Designated Health Care Decision Maker:     Primary Decision Maker: Skye De La Paz - [phone]    Care Preferences    Ventilation: \"If you were in your present state of health and suddenly became very ill and were unable to breathe on your own, what would your preference be about the use of a ventilator (breathing machine) if it were available to you? \"      Would the patient desire the use of ventilator (breathing machine)?: yes    \"If your health worsens and it becomes clear that your chance of recovery is unlikely, what would your preference be about the use of a ventilator (breathing machine) if it were available to you? \"     Would the patient desire the use of ventilator (breathing machine)?: States 'I don't know\"      Resuscitation  \"CPR works best to restart the heart when there is a sudden event, like a heart attack, in someone who is otherwise healthy. Unfortunately, CPR does not typically restart the heart for people who have serious health conditions or who are very sick. \"    \"In the event your heart stopped as a result of an underlying serious health condition, would you want attempts to be made to restart your heart (answer \"yes\" for attempt to resuscitate) or would you prefer a natural death (answer \"no\" for do not attempt to resuscitate)? \" yes       [] Yes   [x] No   Educated Patient / Hannah Vergara regarding differences between Advance Directives and portable DNR orders.     Length of ACP Conversation in minutes:      Conversation Outcomes:  [x] ACP discussion completed  [] Existing advance directive reviewed with patient; no changes to patient's previously recorded wishes  [] New Advance Directive completed  [] Portable Do Not Rescitate prepared for Provider review and signature  [] POLST/POST/MOLST/MOST prepared for Provider review and signature      Follow-up plan:    [] Schedule follow-up conversation to continue planning  [] Referred individual to Provider for additional questions/concerns   [] Advised patient/agent/surrogate to review completed ACP document and update if needed with changes in condition, patient preferences or care setting    [x] This note routed to one or more involved healthcare providers

## 2021-10-05 LAB
ANION GAP SERPL CALCULATED.3IONS-SCNC: 10 MMOL/L (ref 7–16)
BUN BLDV-MCNC: 23 MG/DL (ref 6–20)
CALCIUM SERPL-MCNC: 8.5 MG/DL (ref 8.6–10.2)
CHLORIDE BLD-SCNC: 105 MMOL/L (ref 98–107)
CO2: 26 MMOL/L (ref 22–29)
CREAT SERPL-MCNC: 0.8 MG/DL (ref 0.7–1.2)
GFR AFRICAN AMERICAN: >60
GFR NON-AFRICAN AMERICAN: >60 ML/MIN/1.73
GLUCOSE BLD-MCNC: 133 MG/DL (ref 74–99)
L. PNEUMOPHILA SEROGP 1 UR AG: NORMAL
MAGNESIUM: 2 MG/DL (ref 1.6–2.6)
PHOSPHORUS: 4 MG/DL (ref 2.5–4.5)
POTASSIUM SERPL-SCNC: 4 MMOL/L (ref 3.5–5)
PRO-BNP: 119 PG/ML (ref 0–125)
PROCALCITONIN: 0.12 NG/ML (ref 0–0.08)
SODIUM BLD-SCNC: 141 MMOL/L (ref 132–146)
STREP PNEUMONIAE ANTIGEN, URINE: NORMAL

## 2021-10-05 PROCEDURE — 2060000000 HC ICU INTERMEDIATE R&B

## 2021-10-05 PROCEDURE — 83880 ASSAY OF NATRIURETIC PEPTIDE: CPT

## 2021-10-05 PROCEDURE — 6360000002 HC RX W HCPCS: Performed by: INTERNAL MEDICINE

## 2021-10-05 PROCEDURE — 94640 AIRWAY INHALATION TREATMENT: CPT

## 2021-10-05 PROCEDURE — 2580000003 HC RX 258: Performed by: INTERNAL MEDICINE

## 2021-10-05 PROCEDURE — 80048 BASIC METABOLIC PNL TOTAL CA: CPT

## 2021-10-05 PROCEDURE — 83735 ASSAY OF MAGNESIUM: CPT

## 2021-10-05 PROCEDURE — 6370000000 HC RX 637 (ALT 250 FOR IP): Performed by: INTERNAL MEDICINE

## 2021-10-05 PROCEDURE — 84145 PROCALCITONIN (PCT): CPT

## 2021-10-05 PROCEDURE — 36415 COLL VENOUS BLD VENIPUNCTURE: CPT

## 2021-10-05 PROCEDURE — 6370000000 HC RX 637 (ALT 250 FOR IP): Performed by: STUDENT IN AN ORGANIZED HEALTH CARE EDUCATION/TRAINING PROGRAM

## 2021-10-05 PROCEDURE — 84100 ASSAY OF PHOSPHORUS: CPT

## 2021-10-05 RX ADMIN — FINASTERIDE 5 MG: 5 TABLET, FILM COATED ORAL at 08:55

## 2021-10-05 RX ADMIN — Medication 10 ML: at 21:49

## 2021-10-05 RX ADMIN — BUDESONIDE AND FORMOTEROL FUMARATE DIHYDRATE 2 PUFF: 160; 4.5 AEROSOL RESPIRATORY (INHALATION) at 07:04

## 2021-10-05 RX ADMIN — DEXAMETHASONE SODIUM PHOSPHATE 6 MG: 10 INJECTION INTRAMUSCULAR; INTRAVENOUS at 08:56

## 2021-10-05 RX ADMIN — IPRATROPIUM BROMIDE AND ALBUTEROL 1 PUFF: 20; 100 SPRAY, METERED RESPIRATORY (INHALATION) at 23:41

## 2021-10-05 RX ADMIN — FLUTICASONE PROPIONATE 2 SPRAY: 50 SPRAY, METERED NASAL at 08:56

## 2021-10-05 RX ADMIN — BUDESONIDE AND FORMOTEROL FUMARATE DIHYDRATE 2 PUFF: 160; 4.5 AEROSOL RESPIRATORY (INHALATION) at 18:57

## 2021-10-05 RX ADMIN — ZINC SULFATE 220 MG (50 MG) CAPSULE 50 MG: CAPSULE at 08:55

## 2021-10-05 RX ADMIN — BARICITINIB 4 MG: 2 TABLET, FILM COATED ORAL at 08:55

## 2021-10-05 RX ADMIN — IPRATROPIUM BROMIDE AND ALBUTEROL 1 PUFF: 20; 100 SPRAY, METERED RESPIRATORY (INHALATION) at 00:13

## 2021-10-05 RX ADMIN — IPRATROPIUM BROMIDE AND ALBUTEROL 1 PUFF: 20; 100 SPRAY, METERED RESPIRATORY (INHALATION) at 12:09

## 2021-10-05 RX ADMIN — ENOXAPARIN SODIUM 30 MG: 30 INJECTION SUBCUTANEOUS at 08:56

## 2021-10-05 RX ADMIN — BENZONATATE 100 MG: 100 CAPSULE ORAL at 08:55

## 2021-10-05 RX ADMIN — IPRATROPIUM BROMIDE AND ALBUTEROL 1 PUFF: 20; 100 SPRAY, METERED RESPIRATORY (INHALATION) at 07:03

## 2021-10-05 RX ADMIN — OXYCODONE HYDROCHLORIDE AND ACETAMINOPHEN 500 MG: 500 TABLET ORAL at 08:55

## 2021-10-05 RX ADMIN — Medication 10 ML: at 08:56

## 2021-10-05 RX ADMIN — ENOXAPARIN SODIUM 30 MG: 30 INJECTION SUBCUTANEOUS at 21:35

## 2021-10-05 RX ADMIN — IPRATROPIUM BROMIDE AND ALBUTEROL 1 PUFF: 20; 100 SPRAY, METERED RESPIRATORY (INHALATION) at 18:57

## 2021-10-05 NOTE — PROGRESS NOTES
Pulse ox was_90___% on room air at rest.  Ambulated patient on room air. Oxygen saturation was _86____% on room air while ambulating. Oxygen applied. Recovery pulse ox was __90___% on __2___liters of oxygen while ambulating.

## 2021-10-05 NOTE — PROGRESS NOTES
[address] [place] [address] [facility]  Progress Note    Chief complain:  covid 19    SUBJECTIVE:    HISTORY  Pt came in with c/o cough. diarrhea/vomiting on 10/1  Pt had sx for 3 days PTA  Wbc6.6 cr1 lactic acid2.8  cxry multifocal pneumonia   Pt was d/c to home  Pt comes in today with SOB/covid + 9/30 cills/fatigue/cough/sob/chst pain /nausea  UNVACCINATED   o2 sat 88%on RA  cta 1. There is no evidence of a pulmonary embolus   2. Multifocal bilateral ground-glass and semi solid pulmonary infiltrates   most consistent with COVID pneumonia.    Afebrile wbc5.8 cr0.7  PT LIVES WITH WIFE   HE HAS GUESTS AT HIS HOUSE  UNSURE OF  HIS EXPOSURE  HE WORKS A FACTORY  PT TOOK HIS O2 OFF TO EAT O2 SAT 85% PLACED BACKON 5L     10/5/21   Seen at bedside  Sitting up in chair   Awake and alert   Dry cough   No SOB with exertion   Tolerating medications, no side effects  No n/v/d/f/c/rash    ROS:  Negative except as above     OBJECTIVE:    Vitals:    10/05/21 0645 10/05/21 0704 10/05/21 0900 10/05/21 1209   BP: 126/74  128/72    Pulse: 65  69    Resp: 16  18    Temp: 97.4 °F (36.3 °C)  96.8 °F (36 °C)    TempSrc: Infrared  Infrared    SpO2: 96% 95% 92% 95%   Weight:           HEENT :         unremarkable   Heart:             RRR,  No murmurs, no gallops  Lungs:            clear to auscultation, no wheezing , no rales +2 L via NC   Abdomen:       soft, non tender, bowel sounds present  Extremites:     No edema, no ulcers,  Skin:                Normal turgor,normal texture  Lines :             piv              Current Facility-Administered Medications   Medication Dose Route Frequency Provider Last Rate Last Admin    vitamin E capsule 400 Units  400 Units Oral Daily Fabian Morgan DO        sodium chloride flush 0.9 % injection 5-40 mL  5-40 mL IntraVENous 2 times per day Fabian Morgan DO   10 mL at 10/05/21 0856    sodium chloride flush 0.9 % injection 5-40 mL  5-40 mL IntraVENous PRN Susan Morgan DO  0.9 % sodium chloride infusion  25 mL IntraVENous PRN Fabian Morgan DO        enoxaparin (LOVENOX) injection 30 mg  30 mg SubCUTAneous BID Fabian Morgan DO   30 mg at 10/05/21 0856    ondansetron (ZOFRAN-ODT) disintegrating tablet 4 mg  4 mg Oral Q8H PRN Fabian Morgan DO        Or    ondansetron (ZOFRAN) injection 4 mg  4 mg IntraVENous Q6H PRN Fabian Morgan DO        polyethylene glycol (GLYCOLAX) packet 17 g  17 g Oral Daily PRN Rodríguez Gauze Bisel, DO        acetaminophen (TYLENOL) tablet 650 mg  650 mg Oral Q6H PRN Rodríguez Gauze Bisel, DO        Or    acetaminophen (TYLENOL) suppository 650 mg  650 mg Rectal Q6H PRN Rodríguez Gauze Antoineel, DO        dexamethasone (DECADRON) injection 6 mg  6 mg IntraVENous Q24H Fabian Morgan DO   6 mg at 10/05/21 0856    benzonatate (TESSALON) capsule 100 mg  100 mg Oral BID PRN Rodríguezkiko Morgan, DO   100 mg at 10/05/21 0855    finasteride (PROSCAR) tablet 5 mg  5 mg Oral Daily Fabian Morgan DO   5 mg at 10/05/21 0855    fluticasone (FLONASE) 50 MCG/ACT nasal spray 2 spray  2 spray Each Nostril Daily Fabian Morgan DO   2 spray at 10/05/21 0856    zinc sulfate (ZINCATE) capsule 50 mg  50 mg Oral Daily Fabian Morgan DO   50 mg at 10/05/21 0855    ascorbic acid (VITAMIN C) tablet 500 mg  500 mg Oral Daily Fabian Morgan DO   500 mg at 10/05/21 0855    budesonide-formoterol (SYMBICORT) 160-4.5 MCG/ACT inhaler 2 puff  2 puff Inhalation BID Rodríguez Hector Morgan DO   2 puff at 10/05/21 0704    albuterol-ipratropium (COMBIVENT RESPIMAT)  MCG/ACT inhaler 1 puff  1 puff Inhalation Q6H Fabian Morgan DO   1 puff at 10/05/21 1209    baricitinib (OLUMIANT) tablet 4 mg  4 mg Oral Daily Pixie Lout, DO   4 mg at 10/05/21 0855        LABS    CBC:     WBC   Date Value Ref Range Status   10/04/2021 8.7 4.5 - 11.5 E9/L Final   10/04/2021 5.0 4.5 - 11.5 E9/L Final   10/03/2021 5.8 4.5 - 11.5 E9/L Final     Hematocrit   Date Value Ref Range Status   10/04/2021 44.4 37.0 - 54.0 % Final   10/04/2021 42.3 37.0 - 54.0 % Final   10/03/2021 42.6 37.0 - 54.0 % Final     Platelets   Date Value Ref Range Status   10/04/2021 272 130 - 450 E9/L Final   10/04/2021 227 130 - 450 E9/L Final   10/03/2021 193 130 - 450 E9/L Final         BMP:      Sodium   Date Value Ref Range Status   10/05/2021 141 132 - 146 mmol/L Final   10/04/2021 139 132 - 146 mmol/L Final   10/03/2021 137 132 - 146 mmol/L Final     Potassium   Date Value Ref Range Status   10/05/2021 4.0 3.5 - 5.0 mmol/L Final     Potassium reflex Magnesium   Date Value Ref Range Status   10/04/2021 3.9 3.5 - 5.0 mmol/L Final   10/03/2021 3.6 3.5 - 5.0 mmol/L Final     Chloride   Date Value Ref Range Status   10/05/2021 105 98 - 107 mmol/L Final   10/04/2021 106 98 - 107 mmol/L Final   10/03/2021 105 98 - 107 mmol/L Final     CO2   Date Value Ref Range Status   10/05/2021 26 22 - 29 mmol/L Final   10/04/2021 24 22 - 29 mmol/L Final   10/03/2021 23 22 - 29 mmol/L Final     BUN   Date Value Ref Range Status   10/05/2021 23 (H) 6 - 20 mg/dL Final   10/04/2021 15 6 - 20 mg/dL Final   10/03/2021 14 6 - 20 mg/dL Final     CREATININE   Date Value Ref Range Status   10/05/2021 0.8 0.7 - 1.2 mg/dL Final   10/04/2021 0.7 0.7 - 1.2 mg/dL Final   10/03/2021 0.7 0.7 - 1.2 mg/dL Final     Glucose   Date Value Ref Range Status   10/05/2021 133 (H) 74 - 99 mg/dL Final   10/04/2021 137 (H) 74 - 99 mg/dL Final   10/03/2021 118 (H) 74 - 99 mg/dL Final         Hepatic Function Panel:    Lab Results   Component Value Date    ALKPHOS 44 10/04/2021    ALT 36 10/04/2021    AST 53 10/04/2021    PROT 5.9 10/04/2021    BILITOT 0.5 10/04/2021    LABALBU 3.1 10/04/2021    LABALBU 4.4 10/18/2010        No results found for: SEDRATE    Lab Results   Component Value Date/Time    CRP 8.6 (H) 10/04/2021 04:47 AM       Microbiology :  No results found for: BC  No results found for: BLOODCULT2  No results found for: LABURIN  No results found for: CULTRESP  No results found for:

## 2021-10-05 NOTE — PROGRESS NOTES
Internal Medicine Progress Note    [facility]    Chung Dyson. Osa Lennox., F.A.CIsaiahOIsaiahIIsaiah Mejia D.O., EMI.CIsaiahOLEILA Barrow D.O. Zak Torre, MSN, APRN, NP-C  Yanira Garcia. Jose Alfredo Antunez, MSN, APRN-CNP     Primary Care Physician: Hillis Landau, DO   Admitting Physician:  Simon Grace DO  Admission date and time: 10/3/2021  9:56 AM    Room:  [address] [place] [address]  Admitting diagnosis: Acute respiratory failure with hypoxia [facility]) [J96.01]  COVID [U07.1]    Patient Name: Lexi Maldonado  MRN: [id number]    Date of Service: 10/5/2021     Subjective:  Charo Cid is a 61 y.o. male who was seen and examined today,10/5/2021, at the bedside. Charo Cid has been weaned to 2 L of nasal cannula oxygen and is maintaining an oxygen saturation of 90%. He is consuming lunch and we discussed the need to become more active today. He is utilizing the incentive spirometer without complication. Discharge planning has been discussed. Review of System:   Constitutional:   Improving malaise and fatigue. HEENT:   Denies ear pain, sore throat, sinus or eye problems. Admits to mild irritation associated with the nasal cannula oxygen. Cardiovascular:   Denies any chest pain, irregular heartbeats, or palpitations. Respiratory:   Admits to shortness of breath that is most noticeable with exertion. Gastrointestinal:   Admits to improving abdominal symptomatology. Genitourinary:    Denies any urgency, frequency, hematuria. Voiding  without difficulty. Extremities:   Denies lower extremity swelling, edema or cyanosis. Neurology:    Denies any headache or focal neurological deficits, Denies generalized weakness or memory difficulty. Psch:   Denies being anxious or depressed. Musculoskeletal:    Denies  myalgias, joint complaints or back pain. Integumentary:   Denies any rashes, ulcers, or excoriations. Denies bruising. Hematologic/Lymphatic:  Denies bruising or bleeding.     Physical Exam:  I/O this shift:  In: 240 [P.O.:240]  Out: -     Intake/Output Summary (Last 24 hours) at 10/5/2021 1256  Last data filed at 10/5/2021 0903  Gross per 24 hour   Intake 420 ml   Output 0 ml   Net 420 ml   I/O last 3 completed shifts: In: 360 [P.O.:360]  Out: 0   Patient Vitals for the past 96 hrs (Last 3 readings):   Weight   10/03/21 1026 218 lb (98.9 kg)     Vital Signs:   Blood pressure 128/72, pulse 69, temperature 96.8 °F (36 °C), temperature source Infrared, resp. rate 18, weight 218 lb (98.9 kg), SpO2 95 %. General appearance:  Alert, responsive, oriented to person, place, and time. Well preserved, alert, no distress. Head:  Normocephalic. No masses, lesions or tenderness. Eyes:  PERRLA. EOMI. Sclera clear. Buccal mucosa moist.  ENT:  Ears normal. Mucosa normal.  2 L of nasal cannula oxygen are in place. Neck:    Supple. Trachea midline. No thyromegaly. No JVD. No bruits. Heart:    Rhythm regular. Rate controlled. No murmurs. Lungs:    Symmetrical. Clear to auscultation bilaterally. No wheezes. No rhonchi. No rales. Abdomen:   Soft. Non-tender. Non-distended. Bowel sounds positive. No organomegaly or masses. No pain on palpation. Extremities:    Peripheral pulses present. No peripheral edema. No ulcers. No cyanosis. No clubbing. Neurologic:    Alert x 3. No focal deficit. Cranial nerves grossly intact. No focal weakness. Psych:   Behavior is normal. Mood appears normal. Speech is not rapid and/or pressured. Musculoskeletal:   Spine ROM normal. Muscular strength intact. Gait not assessed. Integumentary:  No rashes  Skin normal color and texture.   Genitalia/Breast:  Deferred    Medication:  Scheduled Meds:   vitamin E  400 Units Oral Daily    sodium chloride flush  5-40 mL IntraVENous 2 times per day    enoxaparin  30 mg SubCUTAneous BID    dexamethasone  6 mg IntraVENous Q24H    finasteride  5 mg Oral Daily    fluticasone  2 spray Each Nostril Daily    zinc sulfate  50 mg Oral Daily  ascorbic acid  500 mg Oral Daily    budesonide-formoterol  2 puff Inhalation BID    albuterol-ipratropium  1 puff Inhalation Q6H    baricitinib  4 mg Oral Daily     Continuous Infusions:   sodium chloride         Objective Data:  CBC with Differential:    Lab Results   Component Value Date    WBC 8.7 10/04/2021    RBC 5.16 10/04/2021    HGB 14.7 10/04/2021    HCT 44.4 10/04/2021     10/04/2021    MCV 86.0 10/04/2021    MCH 28.5 10/04/2021    MCHC 33.1 10/04/2021    RDW 13.7 10/04/2021    METASPCT 0.9 10/03/2021    LYMPHOPCT 10.2 10/04/2021    MONOPCT 3.1 10/04/2021    BASOPCT 0.1 10/04/2021    MONOSABS 0.27 10/04/2021    LYMPHSABS 0.89 10/04/2021    EOSABS 0.00 10/04/2021    BASOSABS 0.01 10/04/2021     BMP:    Lab Results   Component Value Date     10/05/2021    K 4.0 10/05/2021    K 3.9 10/04/2021     10/05/2021    CO2 26 10/05/2021    BUN 23 10/05/2021    LABALBU 3.1 10/04/2021    LABALBU 4.4 10/18/2010    CREATININE 0.8 10/05/2021    CALCIUM 8.5 10/05/2021    GFRAA >60 10/05/2021    LABGLOM >60 10/05/2021    GLUCOSE 133 10/05/2021    GLUCOSE 104 10/18/2010       Assessment:  1. Sepsis secondary to Covid pneumonia resulting in acute respiratory failure with hypoxia in an unvaccinated patient    Plan:   Nilson Higuera has been weaned to 2 L of nasal cannula oxygen and I anticipate continued weaning as the day progresses. We discussed the absolute need to become more ambulatory and to engage in frequent repositioning. He is doing very well utilizing the incentive spirometer. He continues to receive maximal Covid protocol. I anticipate discharging the patient home tomorrow. Greater than 40 minutes of critical care time was spent with the patient. This time included chart review, , and discussion with those consultants involved in the patient's care.     More than 50% of my  time was spent at the bedside counseling/coordinating care with the patient and/or family with face to face

## 2021-10-06 VITALS
HEART RATE: 66 BPM | WEIGHT: 218 LBS | BODY MASS INDEX: 28.76 KG/M2 | TEMPERATURE: 97.1 F | OXYGEN SATURATION: 94 % | DIASTOLIC BLOOD PRESSURE: 64 MMHG | SYSTOLIC BLOOD PRESSURE: 122 MMHG | RESPIRATION RATE: 16 BRPM

## 2021-10-06 LAB
ALBUMIN SERPL-MCNC: 3.3 G/DL (ref 3.5–5.2)
ALP BLD-CCNC: 49 U/L (ref 40–129)
ALT SERPL-CCNC: 66 U/L (ref 0–40)
ANION GAP SERPL CALCULATED.3IONS-SCNC: 10 MMOL/L (ref 7–16)
AST SERPL-CCNC: 65 U/L (ref 0–39)
BILIRUB SERPL-MCNC: 0.7 MG/DL (ref 0–1.2)
BILIRUBIN DIRECT: <0.2 MG/DL (ref 0–0.3)
BILIRUBIN, INDIRECT: ABNORMAL MG/DL (ref 0–1)
BUN BLDV-MCNC: 24 MG/DL (ref 6–20)
C-REACTIVE PROTEIN: 1.3 MG/DL (ref 0–0.4)
CALCIUM SERPL-MCNC: 9 MG/DL (ref 8.6–10.2)
CHLORIDE BLD-SCNC: 104 MMOL/L (ref 98–107)
CO2: 28 MMOL/L (ref 22–29)
CREAT SERPL-MCNC: 0.9 MG/DL (ref 0.7–1.2)
D DIMER: 666 NG/ML DDU
GFR AFRICAN AMERICAN: >60
GFR NON-AFRICAN AMERICAN: >60 ML/MIN/1.73
GLUCOSE BLD-MCNC: 117 MG/DL (ref 74–99)
POTASSIUM SERPL-SCNC: 3.8 MMOL/L (ref 3.5–5)
SODIUM BLD-SCNC: 142 MMOL/L (ref 132–146)
TOTAL PROTEIN: 6.4 G/DL (ref 6.4–8.3)

## 2021-10-06 PROCEDURE — 6370000000 HC RX 637 (ALT 250 FOR IP): Performed by: INTERNAL MEDICINE

## 2021-10-06 PROCEDURE — 94640 AIRWAY INHALATION TREATMENT: CPT

## 2021-10-06 PROCEDURE — 80076 HEPATIC FUNCTION PANEL: CPT

## 2021-10-06 PROCEDURE — 6370000000 HC RX 637 (ALT 250 FOR IP): Performed by: STUDENT IN AN ORGANIZED HEALTH CARE EDUCATION/TRAINING PROGRAM

## 2021-10-06 PROCEDURE — 2700000000 HC OXYGEN THERAPY PER DAY

## 2021-10-06 PROCEDURE — 6360000002 HC RX W HCPCS: Performed by: INTERNAL MEDICINE

## 2021-10-06 PROCEDURE — 36415 COLL VENOUS BLD VENIPUNCTURE: CPT

## 2021-10-06 PROCEDURE — 2580000003 HC RX 258: Performed by: INTERNAL MEDICINE

## 2021-10-06 PROCEDURE — 85378 FIBRIN DEGRADE SEMIQUANT: CPT

## 2021-10-06 PROCEDURE — 80048 BASIC METABOLIC PNL TOTAL CA: CPT

## 2021-10-06 PROCEDURE — 86140 C-REACTIVE PROTEIN: CPT

## 2021-10-06 RX ORDER — ASCORBIC ACID 500 MG
500 TABLET ORAL DAILY
Qty: 30 TABLET | Refills: 3 | Status: SHIPPED | OUTPATIENT
Start: 2021-10-07

## 2021-10-06 RX ORDER — BUDESONIDE AND FORMOTEROL FUMARATE DIHYDRATE 160; 4.5 UG/1; UG/1
2 AEROSOL RESPIRATORY (INHALATION) 2 TIMES DAILY
Qty: 10.2 G | Refills: 3 | Status: SHIPPED | OUTPATIENT
Start: 2021-10-06 | End: 2021-10-13

## 2021-10-06 RX ORDER — DEXAMETHASONE 6 MG/1
6 TABLET ORAL
Qty: 10 TABLET | Refills: 0 | Status: SHIPPED | OUTPATIENT
Start: 2021-10-06 | End: 2021-10-13

## 2021-10-06 RX ORDER — ZINC SULFATE 50(220)MG
50 CAPSULE ORAL DAILY
Qty: 30 CAPSULE | Refills: 3 | COMMUNITY
Start: 2021-10-07 | End: 2021-10-13 | Stop reason: SDUPTHER

## 2021-10-06 RX ORDER — MELATONIN
1000 DAILY
Qty: 30 TABLET | Refills: 0 | Status: SHIPPED | OUTPATIENT
Start: 2021-10-06 | End: 2021-10-13 | Stop reason: SDUPTHER

## 2021-10-06 RX ADMIN — FLUTICASONE PROPIONATE 2 SPRAY: 50 SPRAY, METERED NASAL at 10:43

## 2021-10-06 RX ADMIN — OXYCODONE HYDROCHLORIDE AND ACETAMINOPHEN 500 MG: 500 TABLET ORAL at 10:43

## 2021-10-06 RX ADMIN — Medication 10 ML: at 10:42

## 2021-10-06 RX ADMIN — ZINC SULFATE 220 MG (50 MG) CAPSULE 50 MG: CAPSULE at 10:42

## 2021-10-06 RX ADMIN — IPRATROPIUM BROMIDE AND ALBUTEROL 1 PUFF: 20; 100 SPRAY, METERED RESPIRATORY (INHALATION) at 05:32

## 2021-10-06 RX ADMIN — BUDESONIDE AND FORMOTEROL FUMARATE DIHYDRATE 2 PUFF: 160; 4.5 AEROSOL RESPIRATORY (INHALATION) at 05:33

## 2021-10-06 RX ADMIN — Medication 400 UNITS: at 10:43

## 2021-10-06 RX ADMIN — DEXAMETHASONE SODIUM PHOSPHATE 6 MG: 10 INJECTION INTRAMUSCULAR; INTRAVENOUS at 10:42

## 2021-10-06 RX ADMIN — ENOXAPARIN SODIUM 30 MG: 30 INJECTION SUBCUTANEOUS at 10:42

## 2021-10-06 RX ADMIN — BARICITINIB 4 MG: 2 TABLET, FILM COATED ORAL at 10:42

## 2021-10-06 RX ADMIN — IPRATROPIUM BROMIDE AND ALBUTEROL 1 PUFF: 20; 100 SPRAY, METERED RESPIRATORY (INHALATION) at 09:38

## 2021-10-06 ASSESSMENT — ENCOUNTER SYMPTOMS: SHORTNESS OF BREATH: 1

## 2021-10-06 NOTE — PROGRESS NOTES
Took O2 off while in the room was off approximately 5 minutes, sat 83% on room air at rest.  O2 reapplied at 2 liters sat 94%.

## 2021-10-06 NOTE — PROGRESS NOTES
CLINICAL PHARMACY NOTE: MEDS TO BEDS    Total # of Prescriptions Filled: 3   The following medications were delivered to the patient:  · Vitamin d3 25 mcg  · vitcamin C 500 mg  · Dexamethasone 6 mg    Additional Documentation:  Combivent and Symbicort got transferred to [facility] in [facility].

## 2021-10-06 NOTE — DISCHARGE SUMMARY
Internal Medicine Progress Note     [facility]     Latoya Gonsales. Vance Son., BRYANONAOMY. Deven Siegel D.O., BRYANOELADIO Smith, MSN, APRN, NP-C  [facility]. Karen Amor, MSN, [id number] [facility]       Internal Medicine  Discharge Summary    NAME: Raul Harp  :  1965  MRN:  [id number]  PCP:Osei Shaffer DO  ADMITTED: 10/3/2021      DISCHARGED: 10/6/21    ADMITTING PHYSICIAN: Latoya Morgan DO    CONSULTANT(S):   PHARMACY TO DOSE MEDICATION  IP CONSULT TO INFECTIOUS DISEASES     ADMITTING DIAGNOSIS:   [facility]ute [facility] with [facility] [J96.01]  COVID [U07.1]     DISCHARGE DIAGNOSES:   1. Sepsis secondary to Covid pneumonia resulting in acute respiratory failure with hypoxia in an unvaccinated patient    BRIEF HISTORY OF PRESENT ILLNESS:   The patient is a 50-year-old male who presented to the emergency department with a complaint of cough, shortness of breath, generalized fatigue and myalgias that it started approximately 5 to 7 days ago. Patient states that on approximately  he came to the emergency department for the same complaints as well as diarrhea. He states that since that visit that the diarrhea has resolved. Patient states that over the past couple of days that all of his symptoms have worsened. Denies fever but admits to chills. Again patient states that nausea and diarrhea have resolved. Does admit to loss of appetite. Patient reports he has not had the Covid vaccination. As a result of worsening symptoms he presented to the emergency department for further evaluation. After evaluation in the emergency department-see emergency room notes-it was determined that the patient needed further evaluation and treatment was admitted to the services of Dr. Rosealee Sandifer and Dr. Poncho Domingo.     LABS[de-identified]  Lab Results   Component Value Date    WBC 8.7 10/04/2021    HGB 14.7 10/04/2021    HCT 44.4 10/04/2021     10/04/2021     10/06/2021    K 3.8 10/06/2021     10/06/2021    CREATININE 0.9 10/06/2021    BUN 24 (H) 10/06/2021    CO2 28 10/06/2021    GLUCOSE 117 (H) 10/06/2021    ALT 66 (H) 10/06/2021    AST 65 (H) 10/06/2021    INR 1.2 10/04/2021     Lab Results   Component Value Date    INR 1.2 10/04/2021    PROTIME 14.2 (H) 10/04/2021      Lab Results   Component Value Date    TSH 1.560 09/17/2021     Lab Results   Component Value Date    TRIG 84 09/17/2021    TRIG 102 05/28/2021    TRIG 130 04/22/2021     Lab Results   Component Value Date    HDL 41 09/17/2021    HDL 42 05/28/2021    HDL 41 04/22/2021     Lab Results   Component Value Date    LDLCALC 131 (H) 09/17/2021    LDLCALC 104 (H) 05/28/2021    LDLCALC 137 (H) 04/22/2021     Lab Results   Component Value Date    LABA1C 5.9 (H) 09/17/2021       IMAGING:  XR CHEST (2 VW)    Result Date: 10/1/2021  EXAMINATION: TWO XRAY VIEWS OF THE CHEST 10/1/2021 4:16 pm COMPARISON: None. HISTORY: ORDERING SYSTEM PROVIDED HISTORY: cough TECHNOLOGIST PROVIDED HISTORY: Reason for exam:->cough FINDINGS: Cardiomediastinal silhouette within normal limits. Multifocal airspace disease throughout the lungs bilaterally most consistent with multifocal pneumonia. No evidence of pleural effusions. Pulmonary vasculature is within normal limits. Findings consistent with multifocal pneumonia. CTA PULMONARY W CONTRAST    Result Date: 10/3/2021  EXAMINATION: CTA OF THE CHEST 10/3/2021 11:45 am TECHNIQUE: CTA of the chest was performed after the administration of intravenous contrast.  Multiplanar reformatted images are provided for review. MIP images are provided for review. Dose modulation, iterative reconstruction, and/or weight based adjustment of the mA/kV was utilized to reduce the radiation dose to as low as reasonably achievable. COMPARISON: None.  HISTORY: ORDERING SYSTEM PROVIDED HISTORY: Hypoxia, recent diagnosis of multifocal pneumonia TECHNOLOGIST PROVIDED HISTORY: Reason for exam:->Hypoxia, recent diagnosis of multifocal pneumonia Decision Support Exception - unselect if not a suspected or confirmed emergency medical condition->Emergency Medical Condition (MA) FINDINGS: Pulmonary Arteries: Pulmonary arteries are adequately opacified for evaluation. No evidence of intraluminal filling defect to suggest pulmonary embolism. Main pulmonary artery is normal in caliber. Mediastinum: No evidence of mediastinal lymphadenopathy. The heart and pericardium demonstrate no acute abnormality. There is no acute abnormality of the thoracic aorta. Lungs/pleura: Multifocal bilateral ground-glass and semi solid pulmonary infiltrates are noted most consistent with COVID pneumonia. There is no pleural effusion or pleural thickening. Mary Melissa Upper Abdomen: Limited images of the upper abdomen are unremarkable. Soft Tissues/Bones: No acute bone or soft tissue abnormality. 1. There is no evidence of a pulmonary embolus 2. Multifocal bilateral ground-glass and semi solid pulmonary infiltrates most consistent with COVID pneumonia. HOSPITAL COURSE:   Juan Plasencia is a 65-year-old gentleman who presented to [address] [place] [address] emergency department for the evaluation of shortness of breath. He is unvaccinated and was found to be suffering from Covid pneumonia. He initially required significant oxygen support but has now been weaned to 3 L of nasal cannula oxygen. He received maximal Covid protocol. Medications were transitioned to oral.  He will complete a course of corticosteroids and will also be discharged home with appropriate vitamin supplementation and inhaler therapy. He understands the importance of utilizing the incentive spirometer and frequent repositioning. He has obtained a pulse oximeter and will continue nasal cannula oxygen weaning as an outpatient. Otherwise he improved dramatically was deemed acceptable for discharge home.      BRIEF PHYSICAL EXAMINATION AND LABORATORIES ON DAY OF DISCHARGE:  VITALS:  /64   Pulse 66   Temp 97.1 °F (36.2 °C) (Infrared)   Resp 16   Wt 218 lb (98.9 kg)   SpO2 94%   BMI 28.76 kg/m²     HEENT:  PERRLA. EOMI. Sclera clear. Buccal mucosa moist.  Nasal cannula oxygen was in place. Neck:  Supple. Trachea midline. No thyromegaly. No JVD. No bruits. Heart:  Rhythm regular, rate controlled. No murmurs. Lungs:  Symmetrical. Clear to auscultation bilaterally. No wheezes. No rhonchi. No rales. Abdomen: Soft. Non-tender. Non-distended. Bowel sounds positive. No organomegaly or masses. No pain on palpation    Extremities:  Peripheral pulses present. No peripheral edema. No ulcers. Neurologic:  Alert x 3. No focal deficit. Cranial nerves grossly intact. Skin:  No petechia. No hemorrhage. No wounds. DISPOSITION:  The patient's condition is good. At this time the patient is without objective evidence of an acute process requiring continuing hospitalization or inpatient management. They are stable for discharge with outpatient follow-up. I have spoken with the patient and discussed the results of the current hospitalization, in addition to providing specific details for the plan of care and counseling regarding the diagnosis and prognosis. The plan has been discussed in detail and they are aware of the specific conditions for emergent return, as well as the importance of follow-up.   Their questions are answered at this time and they are agreeable with the plan for discharge to home    DISCHARGE MEDICATIONS:    [place], [address] [facility] Medication Instructions RRK:[id number]    Printed on:10/06/21 1144   Medication Information                      albuterol-ipratropium (COMBIVENT RESPIMAT)  MCG/ACT AERS inhaler  Inhale 1 puff into the lungs every 6 hours             ascorbic acid (VITAMIN C) 500 MG tablet  Take 1 tablet by mouth daily             benzonatate (TESSALON) 100 MG capsule  Take 1 capsule by mouth 2 times daily as needed for Cough             budesonide-formoterol (SYMBICORT) 160-4.5 MCG/ACT AERO  Inhale 2 puffs into the lungs 2 times daily             dexamethasone (DECADRON) 6 MG tablet  Take 1 tablet by mouth daily (with breakfast) for 10 days             finasteride (PROSCAR) 5 MG tablet  Take 1 tablet by mouth daily             fluticasone (FLONASE) 50 MCG/ACT nasal spray  2 sprays by Each Nostril route daily             vitamin D3 (CHOLECALCIFEROL) 25 MCG (1000 UT) TABS tablet  Take 1 tablet by mouth daily             zinc sulfate (ZINCATE) 220 (50 Zn) MG capsule  Take 1 capsule by mouth daily                 FOLLOW UP/INSTRUCTIONS:  · This patient is instructed to follow-up with his primary care physician. · Patient is instructed to follow-up with the consults listed above as directed by them. · he is instructed to resume home medications and take new medications as indicated in the list above. · If the patient has a recurrence of symptoms, he is instructed to go to the ED. Preparing for this patient's discharge, including paperwork, orders, instructions, and meeting with patient did require > 40 minutes.     Viri Steel DO   10/6/2021  11:44 AM

## 2021-10-06 NOTE — CARE COORDINATION
CM Note: 10/6/2021 at 12:27pm: COVID POSITIVE - test done on 10/3. Ambulatory pulse ox testing done and patient did qualify for home O2. Home oxygen order written by Physician. CM talked to the patient on the phone and discussed DME companies who could provide oxygen and he selected [employer] (Phone: [phone] / Fax: [phone]). His home address was confirmed. CM called and spoke to Trinean who states they can accept - requests that CM fax demos, ambulatory pulse ox testing, O2 order and documentation supporting home O2 - done. Cricket Kwon states that [employer] will deliver and O2 tank to the hospital today.  Patient instructed to call [facility] on his way home or right when he gets home so that [employer] can

## 2021-10-06 NOTE — PROGRESS NOTES
[facility] [employer] [facility]     [address] [facility] [address]  [place], [address]  Phone [phone]   [phone]      Admit Date: 10/3/2021  9:56 AM  Pt Name: Ricki Tony  MRN: [id number]  : 1965  Reason for Consult:    Chief Complaint   Patient presents with    Shortness of Breath     pending Covid results from [address] , done at [facility]    Fatigue     with generalized weakness     Requesting Physician:  Lion Palma DO  PCP: Lachelle Wall DO  History Obtained From:  patient, chart   ID consulted for Acute respiratory failure with hypoxia ([facility] [address] [J96.01]  COVID [U07.1]  on hospital day [address] [place]       Chief Complaint   Patient presents with    Shortness of Breath     pending Covid results from [address] , done at [facility] aid    Fatigue     with generalized weakness     HISTORYOF PRESENT ILLNESS   Ricki Tony is a 61 y.o. male who presents with   has no past medical history on file. ED TRIAGEVITALS  BP: 122/64, Temp: 97.1 °F (36.2 °C), Pulse: 66, Resp: 16, SpO2: 94 %  Shortness of Breath    Fatigue  Associated symptoms include fatigue. Pt came in with c/o cough. diarrhea/vomiting on 10/1  Pt ha sx for 3 days PTA  Wbc6.6 cr1 lactic acid2.8  cxry multifocal pneumonia   Pt was d/c to home  Pt comes in today with SOB/covid +  cills/fatigue/cough/sob/chst pain /nausea  UNVACCINATED   o2 sat 88%on RA  cta 1. There is no evidence of a pulmonary embolus   2. Multifocal bilateral ground-glass and semi solid pulmonary infiltrates   most consistent with COVID pneumonia.    Afebrile wbc5.8 cr0.7  PT LIVES WITH WIFE   HE HAS GUESTS AT HIS HOUSE  UNSURE OF  HIS EXPOSURE  HE WORKS A FACTORY  PT TOOK HIS O2 OFF TO EAT O2 SAT 85% PLACED BACKON 5L     CURRENT VISIT  10/6/2021  Afebrile on 2L pt for d/c to home on o2  No f/c/n/v/d    10/4/2021  PT IN BED USING IS HE AH'S NO C/O F/C/N/V/D    REVIEW OF SYSTEMS     CONSTITUTIONAL:  As in hpi     Medications Prior to Admission: capsule 50 mg, 50 mg, Oral, Daily, Fabian Schultzel, DO, 50 mg at 10/06/21 1042    ascorbic acid (VITAMIN C) tablet 500 mg, 500 mg, Oral, Daily, Fabian DENISSE Bisel, DO, 500 mg at 10/06/21 1043    budesonide-formoterol (SYMBICORT) 160-4.5 MCG/ACT inhaler 2 puff, 2 puff, Inhalation, BID, Fabian DENISSE Schultzel, DO, 2 puff at 10/06/21 0533    albuterol-ipratropium (COMBIVENT RESPIMAT)  MCG/ACT inhaler 1 puff, 1 puff, Inhalation, Q6H, Fabian Schultzel, DO, 1 puff at 10/06/21 0938    baricitinib (OLUMIANT) tablet 4 mg, 4 mg, Oral, Daily, Ival Ghee, DO, 4 mg at 10/06/21 1042  ALLERGIES     Patient has no known allergies. Immunization History   Administered Date(s) Administered    Influenza Virus Vaccine 10/30/2014    Influenza, Quadv, IM, PF (6 mo and older Fluzone, Flulaval, Fluarix, and 3 yrs and older Afluria) 11/03/2017, 10/13/2020    Pneumococcal Polysaccharide ([id number]) 09/23/2015      Internal Administration   First Dose      Second Dose           Last COVID Lab SARS-CoV-2, NAAT (no units)   Date Value   10/03/2021 DETECTED (A)            PAST MEDICAL HISTORY   History reviewed. No pertinent past medical history. SURGICAL HISTORY       Past Surgical History:   Procedure Laterality Date    CYST REMOVAL Left     as kid ganglion    HERNIA REPAIR Left     Inguinal    TONSILLECTOMY       FAMILY HISTORY     History reviewed. No pertinent family history. ·      PHYSICAL EXAM          Vitals:    10/05/21 2130 10/06/21 0545 10/06/21 1030 10/06/21 1035   BP: 124/78 130/74 122/64    Pulse: 59 65 66    Resp: 18 18 16    Temp: 97.7 °F (36.5 °C) 97.6 °F (36.4 °C) 97.1 °F (36.2 °C)    TempSrc: Infrared Infrared Infrared    SpO2: 96% 94% (!) 83% 94%   Weight:         Physical Exam   Constitutional/General: Alert and oriented, NAD  Head: NC/AT  Eyes: PERRL   Pulmonary: Lungs dec to auscultation bilaterally POST    Cardiovascular:  Regular rate and rhythm,    Abdomen: Soft, + BS.    distension. Nontender.     Extremities: Moves all extremities x 4. Skin: Warm and dry without rash  Neurologic:    No focal deficits  Psych: Normal Affect     DIAGNOSTIC RESULTS   RADIOLOGY:   XR CHEST (2 VW)    Result Date: 10/1/2021  EXAMINATION: TWO XRAY VIEWS OF THE CHEST 10/1/2021 4:16 pm COMPARISON: None. HISTORY: ORDERING SYSTEM PROVIDED HISTORY: cough TECHNOLOGIST PROVIDED HISTORY: Reason for exam:->cough FINDINGS: Cardiomediastinal silhouette within normal limits. Multifocal airspace disease throughout the lungs bilaterally most consistent with multifocal pneumonia. No evidence of pleural effusions. Pulmonary vasculature is within normal limits. Findings consistent with multifocal pneumonia. CTA PULMONARY W CONTRAST    Result Date: 10/3/2021  EXAMINATION: CTA OF THE CHEST 10/3/2021 11:45 am TECHNIQUE: CTA of the chest was performed after the administration of intravenous contrast.  Multiplanar reformatted images are provided for review. MIP images are provided for review. Dose modulation, iterative reconstruction, and/or weight based adjustment of the mA/kV was utilized to reduce the radiation dose to as low as reasonably achievable. COMPARISON: None. HISTORY: ORDERING SYSTEM PROVIDED HISTORY: Hypoxia, recent diagnosis of multifocal pneumonia TECHNOLOGIST PROVIDED HISTORY: Reason for exam:->Hypoxia, recent diagnosis of multifocal pneumonia Decision Support Exception - unselect if not a suspected or confirmed emergency medical condition->Emergency Medical Condition (MA) FINDINGS: Pulmonary Arteries: Pulmonary arteries are adequately opacified for evaluation. No evidence of intraluminal filling defect to suggest pulmonary embolism. Main pulmonary artery is normal in caliber. Mediastinum: No evidence of mediastinal lymphadenopathy. The heart and pericardium demonstrate no acute abnormality. There is no acute abnormality of the thoracic aorta.  Lungs/pleura: Multifocal bilateral ground-glass and semi solid pulmonary infiltrates are HDL 41 09/17/2021    LDLCALC 131 09/17/2021    LABVLDL 17 09/17/2021     Lab Results   Component Value Date    HCVABI Non-Reactive 02/03/2020     Hep C Ab Interp   Date Value Ref Range Status   02/03/2020 Non-Reactive NON REACT Final        MICROBIOLOGY:       FINAL IMPRESSION    Patient is a 61 y.o. male who presented with   Chief Complaint   Patient presents with    Shortness of Breath     pending Covid results from [address] , done at [facility] aid    Fatigue     with generalized weakness    and admitted for Acute respiratory failure with hypoxia (HCC) [J96.01]  COVID [U07.1] pneumonia        enoxaparin (LOVENOX) injection 30 mg, BID discussed  Risk of dvt/pe     dexamethasone (DECADRON) injection 6 mg, Q24H for 10 days      baricitinib (OLUMIANT) tablet 4 mg, Daily can stop on d/c    FOLLOW LABS  DISCUSSED IMPORTANCE OF PRONING/IS     Can d/c   F/u prn   Imaging and labs were reviewed per medical records and any ID pertinent labs were addressed with the patient. The patient/FAMILY  was educated about the diagnosis, prognosis, indications, risks and benefits of treatment. An opportunity to ask questions was given to the patient/FAMILY and questions were answered. Thank you for involving me in the care of Arnulfo Loaiza. Please do not hesitate to call for any questions or concerns.          Electronically signed by Sarkis Ware MD on 10/6/2021 at 2:50 PM

## 2021-10-07 ENCOUNTER — CARE COORDINATION (OUTPATIENT)
Dept: CASE MANAGEMENT | Age: 56
End: 2021-10-07

## 2021-10-07 ENCOUNTER — TELEPHONE (OUTPATIENT)
Dept: FAMILY MEDICINE CLINIC | Age: 56
End: 2021-10-07

## 2021-10-07 DIAGNOSIS — U07.1 COVID-19: Primary | ICD-10-CM

## 2021-10-07 NOTE — TELEPHONE ENCOUNTER
----- Message from Joshua Evangelista sent at 10/7/2021  1:33 PM EDT -----  Subject: Appointment Request    Reason for Call: Routine Hospital Follow Up    QUESTIONS  Type of Appointment? Established Patient  Reason for appointment request? No appointments available during search  Additional Information for Provider? Pt needs to be seen for a hospital   follow up, was seen at [address] [facility] from 10/3-10/6 for covid and pneumonia,   would like to do a VV my chart visit.   ---------------------------------------------------------------------------  --------------  [address]  What is the best way for the office to contact you? OK to leave message on   voicemail  Preferred Call Back Phone Number? [phone]  ---------------------------------------------------------------------------  --------------  SCRIPT ANSWERS  Relationship to Patient? Other  Representative Name? Alex Goldberg  Additional information verified (besides Name and Date of Birth)? Address  (Patient requests to see provider urgently. )? No  (Has the patient been discharged from the hospital within 2 business days   AND does not have a Telephone Encounter - Follow Up From Hospital   documented in [employer]?)? No  Do you have any questions for your primary care provider that need to be   answered prior to your appointment? (Use RN Triage if question pertains to   anything on the red flag list)? No  (Patient needs follow up visit after hospital discharge) Book first   available appointment within 7 days OF DISCHARGE, if no appt, proceed to   book the next available time slot within 14 days OF DISCHARGE AND Send   Message to Provider. [facility] Follow Up appointment cannot be booked   beyond 14 Days and should result in a Message to Provider. ? Yes   Have you been diagnosed with, awaiting test results for, or told that you   are suspected of having COVID-19 (Coronavirus)?  (If patient has tested   negative or was tested as a requirement for work, school, or travel and   not based on symptoms, answer no)? Yes  Did your symptoms begin within the past 14 days or was your positive test   result within the past 14 days?  Yes

## 2021-10-07 NOTE — CARE COORDINATION
[facility] Transitions Initial Follow Up Call    Call within 2 business days of discharge: Yes    Patient: Arnulfo Loaiza Patient : 1965   MRN: <[id number]>  Reason for Admission: 10/3/2021 - 10/6/2021 [address] CV-19, New O2. Discharge Date: 10/6/21 RARS: Readmission Risk Score: 14  NR  CV-19    Last Discharge [facility]       Complaint Diagnosis Description Type Department Provider    10/3/21 Shortness of Breath; Fatigue Acute respiratory failure with hypoxia ([facility] [address] . .. ED to Hosp-Admission (Discharged) (ADMITTED) [facility] Vicky Chiang DO; Navjot Sanz. .. PCP 10/27 2:00    Patient contacted regarding COVID-19 diagnosis. Discussed COVID-19 related testing which was available at this time. Test results were positive. Patient informed of results, if available? Yes. Care Transition Nurse contacted the patient by telephone to perform post discharge assessment. Call within 2 business days of discharge: Yes. Verified name and  with patient as identifiers. Provided introduction to self, and explanation of the CTN/ACM role, and reason for call due to risk factors for infection and/or exposure to COVID-19. Symptoms reviewed with patient who verbalized the following symptoms: cough and shortness of breath. Alberta Blake reports he has cough worse in AM. States some exertional SOB. Sats 90% on 2L NC. Increased to 3L NC and will monitor. Pt r/u to report trending sats 90% or less/go to ED. Normal appetite and diarrhea currently resolved. No fevers, chills, or acute pain concerns. Due to no new or worsening symptoms encounter was not routed to provider for escalation. Discussed follow-up appointments. If no appointment was previously scheduled, appointment scheduling offered: Enc to move appt sooner for VV, v/u. Has PCP appt 10/27 2:00.   [address] follow up appointment(s):   Future Appointments   Date Time Provider Debra Mendoza   10/27/2021  2:00 PM Alycia Aleman DO Robyn Maldonado [facility]     Non-[facility] follow up appointment(s):     Non-face-to-face services provided:  Reviewed s/s CV-19 to report to physician/go to ED. Advance Care Planning:   Does patient have an Advance Directive:  reviewed and current. Educated patient about risk for severe COVID-19 due to risk factors according to CDC guidelines. CTN reviewed discharge instructions, medical action plan and red flag symptoms with the patient who verbalized understanding. Discussed COVID vaccination status: Yes. Education provided on COVID-19 vaccination as appropriate. Discussed exposure protocols and quarantine with CDC Guidelines. Patient was given an opportunity to verbalize any questions and concerns and agrees to contact CTN or health care provider for questions related to their healthcare. Reviewed and educated patient on any new and changed medications related to discharge diagnosis     Was patient discharged with a pulse oximeter? Yes Discussed and confirmed pulse oximeter discharge instructions and when to notify provider or seek emergency care. CTN provided contact information. Plan for follow-up call in 5-7 days based on severity of symptoms and risk factors.     Care Transitions 24 Hour Call    Do you have any ongoing symptoms?: Yes  Patient-reported symptoms: Cough, Shortness of Breath  Do you have a copy of your discharge instructions?: Yes  Do you have all of your prescriptions and are they filled?: Yes  Have you scheduled your follow up appointment?: Yes  Were you discharged with any Home Care or Post Acute Services: No  Do you feel like you have everything you need to keep you well at home?: Yes  Care Transitions Interventions         Follow Up  Future Appointments   Date Time Provider Debra Mendoza   10/27/2021  2:00 PM 431Roseann Chen RN            Discharge Instructions for [address]    Your Emergency Department provider has diagnosed you or suspects you have the COVID-19 illness. At this time, you are medically safe to be discharged home. We are giving you a Pulse Oximeter to check your blood oxygen level. Normal blood oxygen level is between 93% to 100%. For patients like you with COVID-19, your oxygen level sometimes drops below 93%.  Please use the Pulse-Oximeter at home to check your blood oxygen level twice per day or anytime you have worsening shortness of breath.  Heres how you use the Pulse-Oximeter:        -Place the Pulse-Oximeter on your index finger (remove any nail polish if present). -Direct the red light downwards towards your finger, on top of your fingernail.  -Hold your finger still while the machine reads your blood oxygen level.  If you notice that your blood oxygen level stays below 88% while being active OR stays below 90% while sitting or standing, PLEASE RETURN IMMEDIATELY TO THE EMERGENCY DEPARTMENT.  If you normally require home Oxygen (even before you got COVID-19) and you notice that you need more than 4 liters of Oxygen to keep your oxygen level above 88%, PLEASE RETURN IMMEDIATELY TO THE EMERGENCY DEPARTMENT.  If your shortness of breath is severe or getting worse, no matter what your oxygen level states, PLEASE RETURN IMMEDIATELY TO THE EMERGENCY DEPARTMENT.  If you have chest pain, fainting episodes, heart palpitations, or any other serious medical issue, PLEASE RETURN IMMEDIATELY TO THE EMERGENCY DEPARTMENT.  A [facility] Nurse will follow up with you via a phone call within 24-48 hours after your discharge from the ER to check on how you are doing, discuss pulse-oximeter use, and help you with your follow up appointments.

## 2021-10-13 ENCOUNTER — CARE COORDINATION (OUTPATIENT)
Dept: CASE MANAGEMENT | Age: 56
End: 2021-10-13

## 2021-10-13 ENCOUNTER — TELEMEDICINE (OUTPATIENT)
Dept: FAMILY MEDICINE CLINIC | Age: 56
End: 2021-10-13
Payer: COMMERCIAL

## 2021-10-13 DIAGNOSIS — U07.1 COVID-19: Primary | ICD-10-CM

## 2021-10-13 PROCEDURE — 99495 TRANSJ CARE MGMT MOD F2F 14D: CPT | Performed by: FAMILY MEDICINE

## 2021-10-13 PROCEDURE — 1111F DSCHRG MED/CURRENT MED MERGE: CPT | Performed by: FAMILY MEDICINE

## 2021-10-13 RX ORDER — BENZONATATE 200 MG/1
200 CAPSULE ORAL 3 TIMES DAILY PRN
Qty: 30 CAPSULE | Refills: 0 | Status: SHIPPED | OUTPATIENT
Start: 2021-10-13 | End: 2021-10-23

## 2021-10-13 RX ORDER — DEXAMETHASONE 4 MG/1
4 TABLET ORAL 2 TIMES DAILY WITH MEALS
Qty: 20 TABLET | Refills: 0 | Status: SHIPPED | OUTPATIENT
Start: 2021-10-13 | End: 2021-10-23

## 2021-10-13 RX ORDER — ZINC SULFATE 50(220)MG
50 CAPSULE ORAL DAILY
Qty: 30 CAPSULE | Refills: 3 | COMMUNITY
Start: 2021-10-13 | End: 2021-11-12

## 2021-10-13 RX ORDER — AZITHROMYCIN 250 MG/1
250 TABLET, FILM COATED ORAL SEE ADMIN INSTRUCTIONS
Qty: 6 TABLET | Refills: 0 | Status: SHIPPED | OUTPATIENT
Start: 2021-10-13 | End: 2021-10-18

## 2021-10-13 RX ORDER — MELATONIN
1000 DAILY
Qty: 30 TABLET | Refills: 0 | Status: SHIPPED | OUTPATIENT
Start: 2021-10-13

## 2021-10-13 NOTE — CARE COORDINATION
Tom[facility] Transitions Follow Up Call    10/13/2021    Patient: Eligio Rodriguez  Patient : 1965   MRN: <[id number]>  Reason for Admission: 10/3/2021 - 10/6/2021 [address] CV-19, New O2. Discharge Date: 10/6/21 RARS: Readmission Risk Score: 14  NR  CV-19       PCP 10/13 4:15    Patient contacted regarding COVID-19 diagnosis and pulse oximeter ordered at discharge. Discussed COVID-19 related testing which was available at this time. Test results were positive. Patient informed of results, if available? Yes    Care Transition Nurse contacted the patient by telephone to perform follow-up assessment. Verified name and  with patient as identifiers. Patient has following risk factors of: CV-19, Planter fasciitis. Symptoms reviewed with patient who verbalized the following symptoms: Vaibhav Cheri reports slight pedal and toe edema. States soles of feet ache. Has h/oplanter fasciitis. Advised some covid pts have foot and/or hand neuropathy type discomfort. Enc gentle foot/leg exercises and gentle ambulation. Enc to elevate legs when at rest. Admits exertional activities w/ desats. Enc freq rest periods and avoid strenuous activities until covid symptoms alleviated, v/u. Using 2L NC PRN daytime and at HA w/ sat range 91-95%. Routine sat in low 90's. Enc pt to use 2L NC con't w/ activities/ambulation and at HS and to monitor sats to keep 94% or greater, v/u. Reviewed slow wean attempt for next week, v/u. No elimination or appetite concerns. No fevers, chills, or flu-like symptoms. To have PCP fu today. Due to no new or worsening symptoms encounter was not routed to provider for escalation. Educated patient about risk for severe COVID-19 due to risk factors according to CDC guidelines. CTN reviewed discharge instructions, medical action plan and red flag symptoms with the patient who verbalized understanding. Discussed COVID vaccination status: Yes.  Education provided on COVID-19 vaccination as appropriate. Discussed exposure protocols and quarantine with CDC Guidelines. Patient was given an opportunity to verbalize any questions and concerns and agrees to contact CTN or health care provider for questions related to their healthcare. Was patient discharged with a pulse oximeter? Yes Discussed and confirmed pulse oximeter discharge instructions and when to notify provider or seek emergency care. CTN provided contact information. Plan for follow-up call in 5-7 days based on severity of symptoms and risk factors. Care Transitions Subsequent and Final Call    Subsequent and Final Calls  Do you have any ongoing symptoms?: Yes  Patient-reported symptoms: Cough, Shortness of Breath, Other  Have your medications changed?: No  Do you have any questions related to your medications?: No  Do you currently have any active services?: No  Do you have any needs or concerns that I can assist you with?: No  Identified Barriers: Lack of Education  Care Transitions Interventions  Other Interventions:            Follow Up  Future Appointments   Date Time Provider Debra Mendoza   10/13/2021  4:15 PM DO Gayatri Dumont [facility]   10/27/2021  2:00 PM DO Gayatri Dumont [address]

## 2021-10-18 NOTE — PROGRESS NOTES
Jorge Luis Enriquez is a 61 y.o. male  . Subjective:      Patient is post covid admit. Has been improving. Still on oxygen. We will continue . We will follow closely. Patient to have a low threshold for going to ER if symptoms worsen or are not improving. Review of Systems   Constitutional: Positive for fatigue. Negative for activity change, appetite change, chills, diaphoresis, fever and unexpected weight change. HENT: Positive for congestion, postnasal drip, rhinorrhea and sneezing. Negative for dental problem, drooling, ear discharge, ear pain, facial swelling, hearing loss, mouth sores, nosebleeds, sinus pressure, sore throat, tinnitus, trouble swallowing and voice change. Eyes: Negative for photophobia, pain, discharge, redness, itching and visual disturbance. Respiratory: Positive for wheezing. Negative for apnea, cough, choking, chest tightness, shortness of breath and stridor. Cardiovascular: Negative for chest pain, palpitations and leg swelling. Gastrointestinal: Negative for abdominal distention, abdominal pain, anal bleeding, blood in stool, constipation, diarrhea, nausea, rectal pain and vomiting. Endocrine: Negative for cold intolerance, heat intolerance, polydipsia, polyphagia and polyuria. Genitourinary: Negative for decreased urine volume, difficulty urinating, discharge, dysuria, enuresis, flank pain, frequency, genital sores, hematuria, penile pain, penile swelling, scrotal swelling, testicular pain and urgency. Musculoskeletal: Negative for arthralgias, back pain, gait problem, joint swelling, myalgias, neck pain and neck stiffness. Skin: Negative for color change, pallor, rash and wound. Allergic/Immunologic: Negative for environmental allergies, food allergies and immunocompromised state. Neurological: Negative for dizziness, tremors, seizures, syncope, facial asymmetry, speech difficulty, weakness, light-headedness, numbness and headaches.    Hematological: Negative for adenopathy. Does not bruise/bleed easily. Psychiatric/Behavioral: Negative for agitation, behavioral problems, confusion, decreased concentration, dysphoric mood, hallucinations, self-injury, sleep disturbance and suicidal ideas. The patient is not nervous/anxious and is not hyperactive. No past medical history on file. Social History     Socioeconomic History    Marital status:      Spouse name: Not on file    Number of children: 0    Years of education: Not on file    Highest education level: Not on file   Occupational History    Not on file   Tobacco Use    Smoking status: Never Smoker    Smokeless tobacco: Never Used   Vaping Use    Vaping Use: Never used   Substance and Sexual Activity    Alcohol use: No     Comment: beer once a year    Drug use: No    Sexual activity: Yes     Partners: Female   Other Topics Concern    Not on file   Social History Narrative    Not on file     Social Determinants of Health     Financial Resource Strain:     Difficulty of Paying Living Expenses:    Food Insecurity:     Worried About Running Out of Food in the Last Year:     [address] in the Last Year:    Transportation Needs:     Lack of Transportation (Medical):  Lack of Transportation (Non-Medical):    Physical Activity:     Days of Exercise per Week:     Minutes of Exercise per Session:    Stress:     Feeling of Stress :    Social Connections:     Frequency of Communication with Friends and Family:     Frequency of Social Gatherings with Friends and Family:     Attends Sikhism Services:     Active Member of Clubs or Organizations:     Attends Club or Organization Meetings:     Marital Status:    Intimate Partner Violence:     Fear of Current or Ex-Partner:     Emotionally Abused:     Physically Abused:     Sexually Abused:        No family history on file.     Current Outpatient Medications on File Prior to Visit   Medication Sig Dispense Refill    by mouth See Admin Instructions for 5 days 500mg on day 1 followed by 250mg on days 2 - 5  -     SD DISCHARGE MEDS RECONCILED W/ CURRENT OUTPATIENT MED LIST        Reviewed healthmaintenance report. Patient is aware of deficiencies and suggested preventative tests. Post-Discharge Transitional Care Management Services or Hospital Follow Up      Katlyn Jones   YOB: 1965    Date of Office Visit:  10/13/2021  Date of Hospital Admission: 10/3/21  Date of Hospital Discharge: 10/6/21  Risk of hospital readmission (high >=14%.  Medium >=10%) :Readmission Risk Score: 14      Care management risk score Rising risk (score 2-5) and Complex Care (Scores >=6): 0     Non face to face  following discharge, date last encounter closed (first attempt may have been earlier): 10/7/2021  2:12 PM    Call initiated 2 business days of discharge: Yes    Patient Active Problem List   Diagnosis    Acromioclavicular joint arthritis    Rotator cuff tendonitis    Bursitis of right shoulder    Impingement syndrome of right shoulder    Plantar fasciitis    Difficulty walking    Acute respiratory failure with hypoxia ([address]    Diarrhea due to COVID-19    COVID-19       No Known Allergies    Medications listed as ordered at the time of discharge from hospital   Jang, [address] [facility] Medication Instructions TAVARES:    Printed on:10/17/21 7817   Medication Information                      albuterol-ipratropium (COMBIVENT RESPIMAT)  MCG/ACT AERS inhaler  Inhale 1 puff into the lungs every 6 hours             ascorbic acid (VITAMIN C) 500 MG tablet  Take 1 tablet by mouth daily             azithromycin (ZITHROMAX) 250 MG tablet  Take 1 tablet by mouth See Admin Instructions for 5 days 500mg on day 1 followed by 250mg on days 2 - 5             benzonatate (TESSALON) 200 MG capsule  Take 1 capsule by mouth 3 times daily as needed for Cough             dexamethasone (DECADRON) 4 MG tablet  Take 1 tablet by mouth 2 times daily (with meals) for 10 days             finasteride (PROSCAR) 5 MG tablet  Take 1 tablet by mouth daily             fluticasone (FLONASE) 50 MCG/ACT nasal spray  2 sprays by Each Nostril route daily             mometasone-formoterol (DULERA) 200-5 MCG/ACT inhaler  Inhale 2 puffs into the lungs 2 times daily Rinse mouth after using. vitamin D3 (CHOLECALCIFEROL) 25 MCG (1000 UT) TABS tablet  Take 1 tablet by mouth daily             zinc sulfate (ZINCATE) 220 (50 Zn) MG capsule  Take 1 capsule by mouth daily                   Medications marked \"taking\" at this time  Outpatient Medications Marked as Taking for the 10/13/21 encounter (Telemedicine) with Becca Florian, DO   Medication Sig Dispense Refill    mometasone-formoterol (DULERA) 200-5 MCG/ACT inhaler Inhale 2 puffs into the lungs 2 times daily Rinse mouth after using. 1 each 5    dexamethasone (DECADRON) 4 MG tablet Take 1 tablet by mouth 2 times daily (with meals) for 10 days 20 tablet 0    zinc sulfate (ZINCATE) 220 (50 Zn) MG capsule Take 1 capsule by mouth daily 30 capsule 3    vitamin D3 (CHOLECALCIFEROL) 25 MCG (1000 UT) TABS tablet Take 1 tablet by mouth daily 30 tablet 0    benzonatate (TESSALON) 200 MG capsule Take 1 capsule by mouth 3 times daily as needed for Cough 30 capsule 0    azithromycin (ZITHROMAX) 250 MG tablet Take 1 tablet by mouth See Admin Instructions for 5 days 500mg on day 1 followed by 250mg on days 2 - 5 6 tablet 0        Medications patient taking as of now reconciled against medications ordered at time of hospital discharge: Yes    No chief complaint on file. History of Present illness - Follow up of Hospital diagnosis(es): covid 19    Inpatient course: Discharge summary reviewed- see chart. Interval history/Current status: improved    A comprehensive review of systems was negative except for what was noted in the HPI. There were no vitals filed for this visit.   There is no height or weight on file to calculate BMI.   Wt Readings from Last 3 Encounters:   10/03/21 218 lb (98.9 kg)   10/01/21 218 lb (98.9 kg)   04/22/21 235 lb (106.6 kg)     BP Readings from Last 3 Encounters:   10/06/21 122/64   10/01/21 (!) 100/59   04/22/21 118/80        Physical Exam:      Assessment/Plan:  1. COVID-19    - mometasone-formoterol (DULERA) 200-5 MCG/ACT inhaler; Inhale 2 puffs into the lungs 2 times daily Rinse mouth after using. Dispense: 1 each; Refill: 5  - dexamethasone (DECADRON) 4 MG tablet; Take 1 tablet by mouth 2 times daily (with meals) for 10 days  Dispense: 20 tablet; Refill: 0  - zinc sulfate (ZINCATE) 220 (50 Zn) MG capsule; Take 1 capsule by mouth daily  Dispense: 30 capsule; Refill: 3  - vitamin D3 (CHOLECALCIFEROL) 25 MCG (1000 UT) TABS tablet; Take 1 tablet by mouth daily  Dispense: 30 tablet; Refill: 0  - benzonatate (TESSALON) 200 MG capsule; Take 1 capsule by mouth 3 times daily as needed for Cough  Dispense: 30 capsule; Refill: 0  - azithromycin (ZITHROMAX) 250 MG tablet; Take 1 tablet by mouth See Admin Instructions for 5 days 500mg on day 1 followed by 250mg on days 2 - 5  Dispense: 6 tablet; Refill: 0  - NE DISCHARGE MEDS RECONCILED W/ CURRENT OUTPATIENT MED LIST        Medical Decision Making: moderate complexity    TeleMedicine Video Visit    Shashank Méndez, was evaluated through a synchronous (real-time) audio-video encounter. The patient (or guardian if applicable) is aware that this is a billable service. Verbal consent to proceed has been obtained within the past 12 months. The visit was conducted pursuant to the emergency declaration under the [facility], [address] [place] [address] authority and the [employer] and [employer] General Act. Patient identification was verified, and a caregiver was present when appropriate. The patient was located in a state where the provider was credentialed to provide care.      Patient identification was verified at the start of the visit, including the patient's telephone number and physical location. I discussed with the patient the nature of our telehealth visits, that:     1. Due to the nature of an audio- video modality, the only components of a physical exam that could be done are the elements supported by direct observation. 2. I would evaluate the patient and recommend diagnostics and treatments based on my assessment. 3. If it was felt that the patient should be evaluated in clinic or an emergency room setting, then they would be directed there. 4. Our sessions are not being recorded and that personal health information is protected. 5. Our team would provide follow up care in person if/when the patient needs it.            Patient's location: home address in [facility]  Physician  location other address in [facility] other people involved in call  , wife present       ,     This visit was completed virtually using My Chart/Haiku/Luli

## 2021-10-19 ASSESSMENT — ENCOUNTER SYMPTOMS
PHOTOPHOBIA: 0
COLOR CHANGE: 0
EYE DISCHARGE: 0
FACIAL SWELLING: 0
EYE REDNESS: 0
CONSTIPATION: 0
ABDOMINAL PAIN: 0
TROUBLE SWALLOWING: 0
EYE PAIN: 0
RECTAL PAIN: 0
CHEST TIGHTNESS: 0
VOMITING: 0
WHEEZING: 1
SORE THROAT: 0
BACK PAIN: 0
STRIDOR: 0
SINUS PRESSURE: 0
ANAL BLEEDING: 0
RHINORRHEA: 1
APNEA: 0
NAUSEA: 0
SHORTNESS OF BREATH: 0
EYE ITCHING: 0
CHOKING: 0
VOICE CHANGE: 0
DIARRHEA: 0
COUGH: 0
ABDOMINAL DISTENTION: 0
BLOOD IN STOOL: 0

## 2021-10-20 ENCOUNTER — CARE COORDINATION (OUTPATIENT)
Dept: CASE MANAGEMENT | Age: 56
End: 2021-10-20

## 2021-10-20 NOTE — CARE COORDINATION
[facility] Transitions Follow Up Call    10/20/2021    Patient: Faith Gore  Patient : 1965   MRN: <[id number]>  Reason for Admission: 10/3/2021 - 10/6/2021 [address] CV-19, New O2. Discharge Date: 10/6/21 RARS: Readmission Risk Score: 14  NR  CV-19                   PCP 10/13 4:15. Attended. Patient contacted regarding COVID-19 diagnosis and pulse oximeter ordered at discharge. Care Transition Nurse contacted the patient by telephone to perform follow-up assessment. Verified name and  with patient as identifiers. Patient has following risk factors of: CV-19. Symptoms reviewed with patient who verbalized the following symptoms: Janak Mendoza reports overall improvement. Reports he sats 92-94% on average. Has seen resting sats on 2L NC 95-96%. Cough produces clear mucus and he shares during his hosp stay he noted some blood-tinge to mucus and did have dk sputum after discharge home which is now clear. Reports good appetite and eating healthy food choice/hydrating well. Normal elimination patterns. Is walking daily and states it is energizing and sat tolerance at 94%. No fevers, chills, sensory changes, or acute pain concerns. States pedal edema decreased but continues to have some \"numbness\" to soles of feet. No acute foot pain. Reports stable ambulation w/ steady gait. Ambulates independently. Has inhaler and completing steroid and z-pantera. No HHC or med needs. States physician advised first O2 wean attempt can begin next wk. Does share he has trialed off O2 at rest w/no further desat concerns. Spouse also recovering from covid w/ no current health concerns. Due to no new or worsening symptoms encounter was not routed to provider for escalation. Educated patient about risk for severe COVID-19 due to risk factors according to CDC guidelines.  CTN reviewed discharge instructions, medical action plan and red flag symptoms with the patient who verbalized understanding. Discussed COVID vaccination status: Yes. Education provided on COVID-19 vaccination as appropriate. Discussed exposure protocols and quarantine with CDC Guidelines. Patient was given an opportunity to verbalize any questions and concerns and agrees to contact CTN or health care provider for questions related to their healthcare. Was patient discharged with a pulse oximeter? Yes Discussed and confirmed pulse oximeter discharge instructions and when to notify provider or seek emergency care. CTN provided contact information. Plan for follow-up call in 5-7 days based on severity of symptoms and risk factors. Care Transitions Subsequent and Final Call    Subsequent and Final Calls  Do you have any ongoing symptoms?: Yes  Patient-reported symptoms: Shortness of Breath, Cough, Other  Have your medications changed?: No  Do you have any questions related to your medications?: No  Do you currently have any active services?: No  Do you have any needs or concerns that I can assist you with?: No  Identified Barriers: Lack of Education  Care Transitions Interventions  Other Interventions:            Follow Up  Future Appointments   Date Time Provider Debra Mendoza   10/27/2021  2:00 PM [address] [place] [address] will see patient in ED/ENT

## 2021-10-27 ENCOUNTER — OFFICE VISIT (OUTPATIENT)
Dept: FAMILY MEDICINE CLINIC | Age: 56
End: 2021-10-27
Payer: COMMERCIAL

## 2021-10-27 VITALS
RESPIRATION RATE: 18 BRPM | SYSTOLIC BLOOD PRESSURE: 114 MMHG | HEIGHT: 73 IN | HEART RATE: 59 BPM | WEIGHT: 203 LBS | DIASTOLIC BLOOD PRESSURE: 70 MMHG | OXYGEN SATURATION: 98 % | BODY MASS INDEX: 26.9 KG/M2

## 2021-10-27 DIAGNOSIS — E78.2 MIXED HYPERLIPIDEMIA: ICD-10-CM

## 2021-10-27 DIAGNOSIS — R73.01 IFG (IMPAIRED FASTING GLUCOSE): ICD-10-CM

## 2021-10-27 DIAGNOSIS — U07.1 COVID-19: Primary | ICD-10-CM

## 2021-10-27 DIAGNOSIS — R53.82 CHRONIC FATIGUE: ICD-10-CM

## 2021-10-27 PROCEDURE — 99213 OFFICE O/P EST LOW 20 MIN: CPT | Performed by: FAMILY MEDICINE

## 2021-10-27 SDOH — ECONOMIC STABILITY: FOOD INSECURITY: WITHIN THE PAST 12 MONTHS, THE FOOD YOU BOUGHT JUST DIDN'T LAST AND YOU DIDN'T HAVE MONEY TO GET MORE.: NEVER TRUE

## 2021-10-27 SDOH — ECONOMIC STABILITY: FOOD INSECURITY: WITHIN THE PAST 12 MONTHS, YOU WORRIED THAT YOUR FOOD WOULD RUN OUT BEFORE YOU GOT MONEY TO BUY MORE.: NEVER TRUE

## 2021-10-27 ASSESSMENT — SOCIAL DETERMINANTS OF HEALTH (SDOH): HOW HARD IS IT FOR YOU TO PAY FOR THE VERY BASICS LIKE FOOD, HOUSING, MEDICAL CARE, AND HEATING?: NOT HARD AT ALL

## 2021-10-27 NOTE — LETTER
[address] [id number]  Phone: [phone]  Fax: [address], DO        October 27, 2021    Faith Gore  [address] [place] [address]      To Whom It May Concern,     Patient unable to work until projected date of 11/22/2021 due to medical reasons. If you have any questions or concerns, please don't hesitate to call.     Sincerely,        Jose Macedo, DO

## 2021-10-27 NOTE — PROGRESS NOTES
Negative for adenopathy. Does not bruise/bleed easily. Psychiatric/Behavioral: Negative for agitation, behavioral problems, confusion, decreased concentration, dysphoric mood, hallucinations, self-injury, sleep disturbance and suicidal ideas. The patient is not nervous/anxious and is not hyperactive. No past medical history on file. Social History     Socioeconomic History    Marital status:      Spouse name: Not on file    Number of children: 0    Years of education: Not on file    Highest education level: Not on file   Occupational History    Not on file   Tobacco Use    Smoking status: Never Smoker    Smokeless tobacco: Never Used   Vaping Use    Vaping Use: Never used   Substance and Sexual Activity    Alcohol use: No     Comment: beer once a year    Drug use: No    Sexual activity: Yes     Partners: Female   Other Topics Concern    Not on file   Social History Narrative    Not on file     Social Determinants of Health     Financial Resource Strain: Low Risk     Difficulty of Paying Living Expenses: Not hard at all   Food Insecurity: No Food Insecurity    Worried About [address] in the Last Year: Never true    [address] [employer] in the Last Year: Never true   Transportation Needs:     Lack of Transportation (Medical):  Lack of Transportation (Non-Medical):    Physical Activity:     Days of Exercise per Week:     Minutes of Exercise per Session:    Stress:     Feeling of Stress :    Social Connections:     Frequency of Communication with Friends and Family:     Frequency of Social Gatherings with Friends and Family:     Attends Confucianist Services:     Active Member of Clubs or Organizations:     Attends Club or Organization Meetings:     Marital Status:    Intimate Partner Violence:     Fear of Current or Ex-Partner:     Emotionally Abused:     Physically Abused:     Sexually Abused:        No family history on file.     Current Outpatient Medications on File Prior to Visit   Medication Sig Dispense Refill    mometasone-formoterol (DULERA) 200-5 MCG/ACT inhaler Inhale 2 puffs into the lungs 2 times daily Rinse mouth after using. 1 each 5    zinc sulfate (ZINCATE) 220 (50 Zn) MG capsule Take 1 capsule by mouth daily 30 capsule 3    vitamin D3 (CHOLECALCIFEROL) 25 MCG (1000 UT) TABS tablet Take 1 tablet by mouth daily 30 tablet 0    albuterol-ipratropium (COMBIVENT RESPIMAT)  MCG/ACT AERS inhaler Inhale 1 puff into the lungs every 6 hours 5 each 0    ascorbic acid (VITAMIN C) 500 MG tablet Take 1 tablet by mouth daily 30 tablet 3    finasteride (PROSCAR) 5 MG tablet Take 1 tablet by mouth daily 30 tablet 5    fluticasone (FLONASE) 50 MCG/ACT nasal spray 2 sprays by Each Nostril route daily 1 Bottle 5     No current facility-administered medications on file prior to visit. No Known Allergies    I have reviewed his allergies, medications, problem list, medical,social and family history and have updated as needed in the electronic medical record. Objective:     Physical Exam  Vitals and nursing note reviewed. Constitutional:       General: He is not in acute distress. Appearance: He is well-developed. He is not diaphoretic. HENT:      Head: Normocephalic and atraumatic. Right Ear: External ear normal.      Left Ear: External ear normal.      Nose: Nose normal.      Mouth/Throat:      Pharynx: No oropharyngeal exudate. Eyes:      General: No scleral icterus. Right eye: No discharge. Left eye: No discharge. Conjunctiva/sclera: Conjunctivae normal.      Pupils: Pupils are equal, round, and reactive to light. Neck:      Thyroid: No thyromegaly. Vascular: No JVD. Trachea: No tracheal deviation. Cardiovascular:      Rate and Rhythm: Normal rate and regular rhythm. Heart sounds: Normal heart sounds. No murmur heard. No friction rub. No gallop.     Pulmonary:      Effort: Pulmonary effort is normal. No respiratory distress. Breath sounds: Normal breath sounds. No stridor. No wheezing or rales. Chest:      Chest wall: No tenderness. Abdominal:      General: Bowel sounds are normal. There is no distension. Palpations: Abdomen is soft. There is no mass. Tenderness: There is no abdominal tenderness. There is no guarding or rebound. Genitourinary:     Comments: Deferred by patient  Musculoskeletal:         General: No tenderness. Normal range of motion. Cervical back: Normal range of motion and neck supple. Lymphadenopathy:      Cervical: No cervical adenopathy. Skin:     General: Skin is warm and dry. Coloration: Skin is not pale. Findings: No erythema or rash. Neurological:      Mental Status: He is alert and oriented to person, place, and time. Cranial Nerves: No cranial nerve deficit. Motor: No abnormal muscle tone. Coordination: Coordination normal.      Deep Tendon Reflexes: Reflexes are normal and symmetric. Reflexes normal.   Psychiatric:         Behavior: Behavior normal.         Thought Content: Thought content normal.         Judgment: Judgment normal.         Assessment / Plan:   Barb Lee was seen today for 6 month follow-up. Diagnoses and all orders for this visit:    COVID-19    Mixed hyperlipidemia    IFG (impaired fasting glucose)  -     Hemoglobin A1C; Future    Chronic fatigue  -     CBC Auto Differential; Future  -     Comprehensive Metabolic Panel; Future  -     TSH without Reflex; Future  -     Vitamin B12 & Folate; Future        Reviewed healthmaintenance report. Patient is aware of deficiencies and suggested preventative tests.

## 2021-10-28 ASSESSMENT — ENCOUNTER SYMPTOMS
CHEST TIGHTNESS: 0
CONSTIPATION: 0
TROUBLE SWALLOWING: 0
CHOKING: 0
EYE REDNESS: 0
PHOTOPHOBIA: 0
RECTAL PAIN: 0
ABDOMINAL PAIN: 0
EYE ITCHING: 0
BLOOD IN STOOL: 0
VOICE CHANGE: 0
DIARRHEA: 0
SORE THROAT: 0
RHINORRHEA: 0
APNEA: 0
SHORTNESS OF BREATH: 0
COUGH: 1
EYE PAIN: 0
ANAL BLEEDING: 0
STRIDOR: 0
WHEEZING: 1
BACK PAIN: 0
COLOR CHANGE: 0
EYE DISCHARGE: 0
FACIAL SWELLING: 0
ABDOMINAL DISTENTION: 0
SINUS PRESSURE: 0
VOMITING: 0
NAUSEA: 0

## 2021-10-29 ENCOUNTER — CARE COORDINATION (OUTPATIENT)
Dept: CASE MANAGEMENT | Age: 56
End: 2021-10-29

## 2021-10-29 NOTE — CARE COORDINATION
[facility] Transitions Follow Up Call    10/29/2021    Patient: Shashank Méndez  Patient : 1965   MRN: <[id number]>  Reason for Admission: 10/3/2021 - 10/6/2021 [address] CV-19, New O2. Discharge Date: 10/6/21 RARS: Readmission Risk Score: 14    Patient contacted regarding COVID-19 diagnosis. Care Transition Nurse contacted the patient by telephone to perform follow-up assessment. Verified name and  with patient as identifiers. Patient has following risk factors of: CV-19. Symptoms reviewed with patient who verbalized the following symptoms: States some sinus drainage. Sats 94-95% on 1L NC. Takes off at rest. Advied to trial oxygen wean during day to keep sats 94% or higher, v/u. Denies any SOB or cough concerns. No acute pain concerns. Continues to have some foot sole numbess. Advised to use frozen water bottle to roll foot across as therapy, v/u. Denies any home or med needs. .   Due to no new or worsening symptoms encounter was not routed to provider for escalation. Educated patient about risk for severe COVID-19 due to risk factors according to CDC guidelines. CTN reviewed discharge instructions, medical action plan and red flag symptoms with the patient who verbalized understanding. Discussed COVID vaccination status: Yes. Education provided on COVID-19 vaccination as appropriate. Discussed exposure protocols and quarantine with CDC Guidelines. Patient was given an opportunity to verbalize any questions and concerns and agrees to contact CTN or health care provider for questions related to their healthcare. CTN provided contact information. No further follow-up call identified based on severity of symptoms and risk factors. CTN s/o.      Care Transitions Subsequent and Final Call    Subsequent and Final Calls  Do you have any ongoing symptoms?: Yes  Patient-reported symptoms: Other  Have your medications changed?: No  Do you have any questions related to

## 2021-10-31 DIAGNOSIS — R97.20 ELEVATED PSA: ICD-10-CM

## 2021-10-31 DIAGNOSIS — J30.1 SEASONAL ALLERGIC RHINITIS DUE TO POLLEN: ICD-10-CM

## 2021-11-01 RX ORDER — FLUTICASONE PROPIONATE 50 MCG
2 SPRAY, SUSPENSION (ML) NASAL DAILY
Qty: 1 EACH | Refills: 5 | Status: SHIPPED
Start: 2021-11-01 | End: 2021-11-22

## 2021-11-01 RX ORDER — FINASTERIDE 5 MG/1
5 TABLET, FILM COATED ORAL DAILY
Qty: 30 TABLET | Refills: 5 | Status: SHIPPED
Start: 2021-11-01 | End: 2022-02-18 | Stop reason: SDUPTHER

## 2021-11-08 ENCOUNTER — HOSPITAL ENCOUNTER (EMERGENCY)
Age: 56
Discharge: HOME | End: 2021-11-08
Payer: COMMERCIAL

## 2021-11-08 VITALS
RESPIRATION RATE: 20 BRPM | SYSTOLIC BLOOD PRESSURE: 109 MMHG | DIASTOLIC BLOOD PRESSURE: 70 MMHG | OXYGEN SATURATION: 97 % | WEIGHT: 191 LBS | TEMPERATURE: 97 F | HEART RATE: 64 BPM | BODY MASS INDEX: 25.31 KG/M2 | HEIGHT: 73 IN
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
## 2021-11-08 PROCEDURE — 99211 OFF/OP EST MAY X REQ PHY/QHP: CPT

## 2021-11-08 RX ORDER — DOXYCYCLINE HYCLATE 100 MG
100 TABLET ORAL 2 TIMES DAILY
Qty: 28 TABLET | Refills: 0 | Status: SHIPPED | OUTPATIENT
Start: 2021-11-08 | End: 2021-11-22

## 2021-11-08 RX ORDER — DOXYCYCLINE HYCLATE 100 MG
100 TABLET ORAL 2 TIMES DAILY
Qty: 20 TABLET | Refills: 0 | Status: SHIPPED | OUTPATIENT
Start: 2021-11-08 | End: 2021-11-08 | Stop reason: SDUPTHER

## 2021-11-08 NOTE — ED PROVIDER NOTES
Department of Emergency [address] [facility]  Provider Note  Admit Date/Time: 2021 12:06 PM  Room:   NAME: Jessica Wilkerson  : 1965  MRN: [id number]     Chief Complaint:  Tick Removal (noticed  saturday    ring around  right forearm   was outside   did not see tick)    History of Present Illness        Jessica Wilkerson is a 61 y.o. male who has a past medical history of: History reviewed. No pertinent past medical history. presents to the urgent care center by private car for double tick bite. He said [place]ab[place]ance of ticks there he said he might pull 10 ticks off for you at a time he noticed on his right forearm he has a circular rash with a central clearing he thinks he probably had a tick bite there but he is not sure he did not really pull a tick off. He is not complaining of fever, body aches or flulike symptoms. But he is concerned about Lyme disease. He said he is seeing his doctor next week and will get the blood work done then. ROS    Pertinent positives and negatives are stated within HPI, all other systems reviewed and are negative. Past Surgical History:   Procedure Laterality Date    CYST REMOVAL Left     as kid ganglion    HERNIA REPAIR Left     Inguinal    TONSILLECTOMY     Social History:  reports that he has never smoked. He has never used smokeless tobacco. He reports that he does not drink alcohol and does not use drugs. Family History: family history is not on file. Allergies: Patient has no known allergies. Physical Exam   Oxygen Saturation Interpretation: Normal.   ED Triage Vitals [21 1208]   BP Temp Temp Source Pulse Resp SpO2 Height Weight   109/70 97 °F (36.1 °C) Infrared 64 20 97 % 6' 1\" (1.854 m) 191 lb (86.6 kg)       Physical Exam  · Constitutional/General: Alert and oriented x3, well appearing, non toxic in NAD  · HEENT:  NC/NT.  Clear conjunctiva,   · Neck: Supple, full ROM, · Respiratory: Lungs clear to auscultation bilaterally, no wheezes, rales, or rhonchi. Not in respiratory distress  · CV:  Regular rate. Regular rhythm. · Musculoskeletal: Moves all extremities x 4.   · Integument: skin warm and dry. In the right forearm he has a small circular rash with central clearing that appears to be erythema migrans it is about the size of a nickel. He has no rashes anywhere else. · Lymphatic: no lymphadenopathy noted  · Neurologic: GCS 15, no focal deficits,  · Psychiatric: Normal Affect    Lab / Imaging Results   (All laboratory and radiology results have been personally reviewed by myself)  Labs:  No results found for this visit on 11/08/21. Imaging: All Radiology results interpreted by Radiologist unless otherwise noted. No orders to display       ED Course / Medical Decision Making   Medications - No data to display       Consult(s):   None      MDM:   Patient is concerned with Lyme disease he is seeing his doctor next week and he said he will get the blood work drawn for Lyme disease he wants treated I did start him on the doxycycline advised if he has any worsening symptoms she should get reevaluated        Assessment      1. Tick bite, unspecified site, initial encounter      Plan   Discharge to home and advised to contact Bernie Corrales, [address] [place] [id number]  [phone]    Schedule an appointment as soon as possible for a visit      Patient condition is good    New Medications     Current Discharge Medication List      START taking these medications    Details   doxycycline hyclate (VIBRA-TABS) 100 MG tablet Take 1 tablet by mouth 2 times daily for 14 days  Qty: 28 tablet, Refills: 0           Electronically signed by DEBRA Neal CNP   DD: 11/8/21  **This report was transcribed using voice recognition software. Every effort was made to ensure accuracy; however, inadvertent computerized transcription errors may be present.   END OF ED

## 2021-11-12 DIAGNOSIS — R53.82 CHRONIC FATIGUE: ICD-10-CM

## 2021-11-12 DIAGNOSIS — R73.01 IFG (IMPAIRED FASTING GLUCOSE): ICD-10-CM

## 2021-11-12 LAB
ALBUMIN SERPL-MCNC: 3.9 G/DL (ref 3.5–5.2)
ALP BLD-CCNC: 53 U/L (ref 40–129)
ALT SERPL-CCNC: 35 U/L (ref 0–40)
ANION GAP SERPL CALCULATED.3IONS-SCNC: 15 MMOL/L (ref 7–16)
AST SERPL-CCNC: 24 U/L (ref 0–39)
BASOPHILS ABSOLUTE: 0.03 E9/L (ref 0–0.2)
BASOPHILS RELATIVE PERCENT: 0.5 % (ref 0–2)
BILIRUB SERPL-MCNC: 1.6 MG/DL (ref 0–1.2)
BUN BLDV-MCNC: 18 MG/DL (ref 6–20)
CALCIUM SERPL-MCNC: 9.4 MG/DL (ref 8.6–10.2)
CHLORIDE BLD-SCNC: 109 MMOL/L (ref 98–107)
CO2: 19 MMOL/L (ref 22–29)
CREAT SERPL-MCNC: 0.8 MG/DL (ref 0.7–1.2)
EOSINOPHILS ABSOLUTE: 0.1 E9/L (ref 0.05–0.5)
EOSINOPHILS RELATIVE PERCENT: 1.7 % (ref 0–6)
FOLATE: 16.9 NG/ML (ref 4.8–24.2)
GFR AFRICAN AMERICAN: >60
GFR NON-AFRICAN AMERICAN: >60 ML/MIN/1.73
GLUCOSE BLD-MCNC: 89 MG/DL (ref 74–99)
HBA1C MFR BLD: 5.6 % (ref 4–5.6)
HCT VFR BLD CALC: 41.5 % (ref 37–54)
HEMOGLOBIN: 13.1 G/DL (ref 12.5–16.5)
IMMATURE GRANULOCYTES #: 0.03 E9/L
IMMATURE GRANULOCYTES %: 0.5 % (ref 0–5)
LYMPHOCYTES ABSOLUTE: 1.37 E9/L (ref 1.5–4)
LYMPHOCYTES RELATIVE PERCENT: 23.3 % (ref 20–42)
MCH RBC QN AUTO: 28 PG (ref 26–35)
MCHC RBC AUTO-ENTMCNC: 31.6 % (ref 32–34.5)
MCV RBC AUTO: 88.7 FL (ref 80–99.9)
MONOCYTES ABSOLUTE: 0.61 E9/L (ref 0.1–0.95)
MONOCYTES RELATIVE PERCENT: 10.4 % (ref 2–12)
NEUTROPHILS ABSOLUTE: 3.75 E9/L (ref 1.8–7.3)
NEUTROPHILS RELATIVE PERCENT: 63.6 % (ref 43–80)
PDW BLD-RTO: 15.6 FL (ref 11.5–15)
PLATELET # BLD: 253 E9/L (ref 130–450)
PMV BLD AUTO: 10.5 FL (ref 7–12)
POTASSIUM SERPL-SCNC: 4.2 MMOL/L (ref 3.5–5)
RBC # BLD: 4.68 E12/L (ref 3.8–5.8)
SODIUM BLD-SCNC: 143 MMOL/L (ref 132–146)
TOTAL PROTEIN: 6.4 G/DL (ref 6.4–8.3)
TSH SERPL DL<=0.05 MIU/L-ACNC: 1.97 UIU/ML (ref 0.27–4.2)
VITAMIN B-12: 466 PG/ML (ref 211–946)
WBC # BLD: 5.9 E9/L (ref 4.5–11.5)

## 2021-11-22 ENCOUNTER — OFFICE VISIT (OUTPATIENT)
Dept: FAMILY MEDICINE CLINIC | Age: 56
End: 2021-11-22
Payer: COMMERCIAL

## 2021-11-22 VITALS
RESPIRATION RATE: 18 BRPM | OXYGEN SATURATION: 99 % | HEIGHT: 73 IN | BODY MASS INDEX: 26.37 KG/M2 | DIASTOLIC BLOOD PRESSURE: 70 MMHG | SYSTOLIC BLOOD PRESSURE: 116 MMHG | HEART RATE: 84 BPM | WEIGHT: 199 LBS

## 2021-11-22 DIAGNOSIS — J30.1 SEASONAL ALLERGIC RHINITIS DUE TO POLLEN: ICD-10-CM

## 2021-11-22 DIAGNOSIS — Z23 NEED FOR INFLUENZA VACCINATION: ICD-10-CM

## 2021-11-22 DIAGNOSIS — S50.861D TICK BITE OF RIGHT FOREARM, SUBSEQUENT ENCOUNTER: ICD-10-CM
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
## 2021-11-22 DIAGNOSIS — Z86.16 HISTORY OF COVID-19: ICD-10-CM

## 2021-11-22 DIAGNOSIS — Z86.16 HISTORY OF COVID-19: Primary | ICD-10-CM

## 2021-11-22 DIAGNOSIS — Z23 NEED FOR SHINGLES VACCINE: ICD-10-CM

## 2021-11-22 PROCEDURE — 99213 OFFICE O/P EST LOW 20 MIN: CPT | Performed by: FAMILY MEDICINE

## 2021-11-22 PROCEDURE — 90674 CCIIV4 VAC NO PRSV 0.5 ML IM: CPT | Performed by: FAMILY MEDICINE

## 2021-11-22 PROCEDURE — 90471 IMMUNIZATION ADMIN: CPT | Performed by: FAMILY MEDICINE

## 2021-11-22 RX ORDER — AZELASTINE 1 MG/ML
2 SPRAY, METERED NASAL 2 TIMES DAILY
Qty: 120 ML | Refills: 5 | Status: SHIPPED | OUTPATIENT
Start: 2021-11-22

## 2021-11-22 RX ORDER — ZOSTER VACCINE RECOMBINANT, ADJUVANTED 50 MCG/0.5
0.5 KIT INTRAMUSCULAR SEE ADMIN INSTRUCTIONS
Qty: 0.5 ML | Refills: 0 | Status: SHIPPED | OUTPATIENT
Start: 2021-11-22 | End: 2022-05-21

## 2021-11-22 NOTE — PROGRESS NOTES
Garth Cui is a 61 y.o. male  . Subjective:      Discussed covid vaccine. Discussed influenza vaccine. Has been two months since covid. Feeling much better. Still having a little neuropathy of feet. Sill having some post nasal drip. Discussed blood work at [facility]. Oxygen has been discontinued. Review of Systems   Constitutional: Negative for activity change, appetite change, chills, diaphoresis, fatigue, fever and unexpected weight change. HENT: Negative for congestion, dental problem, drooling, ear discharge, ear pain, facial swelling, hearing loss, mouth sores, nosebleeds, postnasal drip, rhinorrhea, sinus pressure, sneezing, sore throat, tinnitus, trouble swallowing and voice change. Eyes: Negative for photophobia, pain, discharge, redness, itching and visual disturbance. Respiratory: Negative for apnea, cough, choking, chest tightness, shortness of breath, wheezing and stridor. Cardiovascular: Negative for chest pain, palpitations and leg swelling. Gastrointestinal: Negative for abdominal distention, abdominal pain, anal bleeding, blood in stool, constipation, diarrhea, nausea, rectal pain and vomiting. Endocrine: Negative for cold intolerance, heat intolerance, polydipsia, polyphagia and polyuria. Genitourinary: Negative for decreased urine volume, difficulty urinating, dysuria, enuresis, flank pain, frequency, genital sores, hematuria, penile discharge, penile pain, penile swelling, scrotal swelling, testicular pain and urgency. Musculoskeletal: Negative for arthralgias, back pain, gait problem, joint swelling, myalgias, neck pain and neck stiffness. Skin: Negative for color change, pallor, rash and wound. Allergic/Immunologic: Negative for environmental allergies, food allergies and immunocompromised state. Neurological: Negative for dizziness, tremors, seizures, syncope, facial asymmetry, speech difficulty, weakness, light-headedness, numbness and headaches.    Hematological: Negative for adenopathy. Does not bruise/bleed easily. Psychiatric/Behavioral: Negative for agitation, behavioral problems, confusion, decreased concentration, dysphoric mood, hallucinations, self-injury, sleep disturbance and suicidal ideas. The patient is not nervous/anxious and is not hyperactive. No past medical history on file. Social History     Socioeconomic History    Marital status:      Spouse name: Not on file    Number of children: 0    Years of education: Not on file    Highest education level: Not on file   Occupational History    Not on file   Tobacco Use    Smoking status: Never Smoker    Smokeless tobacco: Never Used   Vaping Use    Vaping Use: Never used   Substance and Sexual Activity    Alcohol use: No     Comment: beer once a year    Drug use: No    Sexual activity: Yes     Partners: Female   Other Topics Concern    Not on file   Social History Narrative    Not on file     Social Determinants of Health     Financial Resource Strain: Low Risk     Difficulty of Paying Living Expenses: Not hard at all   Food Insecurity: No Food Insecurity    Worried About [address] in the Last Year: Never true    [address] in the Last Year: Never true   Transportation Needs:     Lack of Transportation (Medical): Not on file    Lack of Transportation (Non-Medical):  Not on file   Physical Activity:     Days of Exercise per Week: Not on file    Minutes of Exercise per Session: Not on file   Stress:     Feeling of Stress : Not on file   Social Connections:     Frequency of Communication with Friends and Family: Not on file    Frequency of Social Gatherings with Friends and Family: Not on file    Attends Yarsani Services: Not on file    Active Member of Clubs or Organizations: Not on file    Attends Club or Organization Meetings: Not on file    Marital Status: Not on file   Intimate Partner Violence:     Fear of Current or Ex-Partner: Not on file   Jackeline Martinez Emotionally Abused: Not on file    Physically Abused: Not on file    Sexually Abused: Not on file   Housing Stability:     Unable to Pay for Housing in the Last Year: Not on file    Number of Places Lived in the Last Year: Not on file    Unstable Housing in the Last Year: Not on file       No family history on file. Current Outpatient Medications on File Prior to Visit   Medication Sig Dispense Refill    doxycycline hyclate (VIBRA-TABS) 100 MG tablet Take 1 tablet by mouth 2 times daily for 14 days 28 tablet 0    fluticasone (FLONASE) 50 MCG/ACT nasal spray 2 sprays by Each Nostril route daily 1 each 5    finasteride (PROSCAR) 5 MG tablet Take 1 tablet by mouth daily 30 tablet 5    mometasone-formoterol (DULERA) 200-5 MCG/ACT inhaler Inhale 2 puffs into the lungs 2 times daily Rinse mouth after using. 1 each 5    vitamin D3 (CHOLECALCIFEROL) 25 MCG (1000 UT) TABS tablet Take 1 tablet by mouth daily 30 tablet 0    albuterol-ipratropium (COMBIVENT RESPIMAT)  MCG/ACT AERS inhaler Inhale 1 puff into the lungs every 6 hours 5 each 0    ascorbic acid (VITAMIN C) 500 MG tablet Take 1 tablet by mouth daily 30 tablet 3    zinc sulfate (ZINCATE) 220 (50 Zn) MG capsule Take 1 capsule by mouth daily 30 capsule 3     No current facility-administered medications on file prior to visit. No Known Allergies    I have reviewed his allergies, medications, problem list, medical,social and family history and have updated as needed in the electronic medical record. Objective:     Physical Exam  Vitals and nursing note reviewed. Constitutional:       General: He is not in acute distress. Appearance: He is well-developed. He is not diaphoretic. HENT:      Head: Normocephalic and atraumatic. Right Ear: External ear normal.      Left Ear: External ear normal.      Nose: Nose normal.      Mouth/Throat:      Pharynx: No oropharyngeal exudate. Eyes:      General: No scleral icterus.         Right eye: No discharge. Left eye: No discharge. Conjunctiva/sclera: Conjunctivae normal.      Pupils: Pupils are equal, round, and reactive to light. Neck:      Thyroid: No thyromegaly. Vascular: No JVD. Trachea: No tracheal deviation. Cardiovascular:      Rate and Rhythm: Normal rate and regular rhythm. Heart sounds: Normal heart sounds. No murmur heard. No friction rub. No gallop. Pulmonary:      Effort: Pulmonary effort is normal. No respiratory distress. Breath sounds: Normal breath sounds. No stridor. No wheezing or rales. Chest:      Chest wall: No tenderness. Abdominal:      General: Bowel sounds are normal. There is no distension. Palpations: Abdomen is soft. There is no mass. Tenderness: There is no abdominal tenderness. There is no guarding or rebound. Genitourinary:     Comments: Deferred by patient  Musculoskeletal:         General: No tenderness. Normal range of motion. Cervical back: Normal range of motion and neck supple. Lymphadenopathy:      Cervical: No cervical adenopathy. Skin:     General: Skin is warm and dry. Coloration: Skin is not pale. Findings: No erythema or rash. Neurological:      Mental Status: He is alert and oriented to person, place, and time. Cranial Nerves: No cranial nerve deficit. Motor: No abnormal muscle tone. Coordination: Coordination normal.      Deep Tendon Reflexes: Reflexes are normal and symmetric. Reflexes normal.   Psychiatric:         Behavior: Behavior normal.         Thought Content: Thought content normal.         Judgment: Judgment normal.         Assessment / Plan:   Vivian Wong was seen today for 1 month follow-up. Diagnoses and all orders for this visit:    History of COVID-19  -     Cancel: Covid-19, Antibody; Future    Tick bite of right forearm, subsequent encounter  -     Lyme Disease Acute Reflexive Panel;  Future    Need for shingles vaccine  -     zoster recombinant adjuvanted vaccine [facility]) 50 MCG/0.5ML SUSR injection; Inject 0.5 mLs into the muscle See Admin Instructions 1 dose now and repeat in 2-6 months    Seasonal allergic rhinitis due to pollen  -     azelastine (ASTELIN) 0.1 % nasal spray; 2 sprays by Nasal route 2 times daily Use in each nostril as directed    Need for influenza vaccination  -     INFLUENZA, MDCK QUADV, 2 YRS AND OLDER, IM, PF, PREFILL SYR OR SDV, 0.5ML (FLUCELVAX QUADV, PF)        Reviewed healthmaintenance report. Patient is aware of deficiencies and suggested preventative tests.

## 2021-11-23 LAB — SARS-COV-2 ANTIBODY, TOTAL: REACTIVE

## 2021-11-24 ASSESSMENT — ENCOUNTER SYMPTOMS
RECTAL PAIN: 0
NAUSEA: 0
SORE THROAT: 0
SHORTNESS OF BREATH: 0
STRIDOR: 0
SINUS PRESSURE: 0
APNEA: 0
VOMITING: 0
COLOR CHANGE: 0
CHOKING: 0
EYE ITCHING: 0
RHINORRHEA: 0
DIARRHEA: 0
FACIAL SWELLING: 0
PHOTOPHOBIA: 0
TROUBLE SWALLOWING: 0
VOICE CHANGE: 0
EYE PAIN: 0
BACK PAIN: 0
BLOOD IN STOOL: 0
WHEEZING: 0
EYE REDNESS: 0
CHEST TIGHTNESS: 0
EYE DISCHARGE: 0
COUGH: 0
ABDOMINAL DISTENTION: 0
ABDOMINAL PAIN: 0
CONSTIPATION: 0
ANAL BLEEDING: 0

## 2021-11-25 LAB — LYME, EIA: 0.08 LIV (ref 0–1.2)

## 2022-02-18 DIAGNOSIS — R97.20 ELEVATED PSA: ICD-10-CM

## 2022-02-18 RX ORDER — FINASTERIDE 5 MG/1
5 TABLET, FILM COATED ORAL DAILY
Qty: 30 TABLET | Refills: 5 | Status: SHIPPED
Start: 2022-02-18 | End: 2022-08-26

## 2022-08-03 DIAGNOSIS — N40.0 BENIGN PROSTATIC HYPERPLASIA WITHOUT LOWER URINARY TRACT SYMPTOMS: Primary | ICD-10-CM

## 2022-08-22 DIAGNOSIS — R97.20 ELEVATED PSA: ICD-10-CM

## 2022-08-26 RX ORDER — FINASTERIDE 5 MG/1
TABLET, FILM COATED ORAL
Qty: 30 TABLET | Refills: 5 | Status: SHIPPED
Start: 2022-08-26 | End: 2022-09-19 | Stop reason: SDUPTHER

## 2022-09-15 ENCOUNTER — OFFICE VISIT (OUTPATIENT)
Dept: FAMILY MEDICINE CLINIC | Age: 57
End: 2022-09-15
Payer: COMMERCIAL

## 2022-09-15 VITALS
OXYGEN SATURATION: 98 % | HEART RATE: 60 BPM | BODY MASS INDEX: 26.24 KG/M2 | SYSTOLIC BLOOD PRESSURE: 118 MMHG | WEIGHT: 198 LBS | RESPIRATION RATE: 18 BRPM | HEIGHT: 73 IN | DIASTOLIC BLOOD PRESSURE: 70 MMHG

## 2022-09-15 DIAGNOSIS — R53.82 CHRONIC FATIGUE: ICD-10-CM

## 2022-09-15 DIAGNOSIS — E55.9 VITAMIN D DEFICIENCY: ICD-10-CM

## 2022-09-15 DIAGNOSIS — E78.2 MIXED HYPERLIPIDEMIA: ICD-10-CM

## 2022-09-15 DIAGNOSIS — R73.01 IFG (IMPAIRED FASTING GLUCOSE): ICD-10-CM

## 2022-09-15 DIAGNOSIS — R97.20 ELEVATED PSA: ICD-10-CM

## 2022-09-15 DIAGNOSIS — Z12.5 SCREENING FOR MALIGNANT NEOPLASM OF PROSTATE: ICD-10-CM

## 2022-09-15 DIAGNOSIS — R73.01 IFG (IMPAIRED FASTING GLUCOSE): Primary | ICD-10-CM

## 2022-09-15 LAB
ALBUMIN SERPL-MCNC: 4.5 G/DL (ref 3.5–5.2)
ALP BLD-CCNC: 72 U/L (ref 40–129)
ALT SERPL-CCNC: 24 U/L (ref 0–40)
ANION GAP SERPL CALCULATED.3IONS-SCNC: 12 MMOL/L (ref 7–16)
AST SERPL-CCNC: 23 U/L (ref 0–39)
BILIRUB SERPL-MCNC: 1.1 MG/DL (ref 0–1.2)
BUN BLDV-MCNC: 25 MG/DL (ref 6–20)
CALCIUM SERPL-MCNC: 9.5 MG/DL (ref 8.6–10.2)
CHLORIDE BLD-SCNC: 102 MMOL/L (ref 98–107)
CHOLESTEROL, TOTAL: 182 MG/DL (ref 0–199)
CO2: 25 MMOL/L (ref 22–29)
CREAT SERPL-MCNC: 0.9 MG/DL (ref 0.7–1.2)
FOLATE: 12.2 NG/ML (ref 4.8–24.2)
GFR AFRICAN AMERICAN: >60
GFR NON-AFRICAN AMERICAN: >60 ML/MIN/1.73
GLUCOSE BLD-MCNC: 81 MG/DL (ref 74–99)
HBA1C MFR BLD: 5.8 % (ref 4–5.6)
HCT VFR BLD CALC: 46.3 % (ref 37–54)
HDLC SERPL-MCNC: 44 MG/DL
HEMOGLOBIN: 15.3 G/DL (ref 12.5–16.5)
LDL CHOLESTEROL CALCULATED: 118 MG/DL (ref 0–99)
MCH RBC QN AUTO: 29 PG (ref 26–35)
MCHC RBC AUTO-ENTMCNC: 33 % (ref 32–34.5)
MCV RBC AUTO: 87.9 FL (ref 80–99.9)
PDW BLD-RTO: 13.2 FL (ref 11.5–15)
PLATELET # BLD: 258 E9/L (ref 130–450)
PMV BLD AUTO: 10.4 FL (ref 7–12)
POTASSIUM SERPL-SCNC: 4.3 MMOL/L (ref 3.5–5)
PROSTATE SPECIFIC ANTIGEN: 3.85 NG/ML (ref 0–4)
RBC # BLD: 5.27 E12/L (ref 3.8–5.8)
SODIUM BLD-SCNC: 139 MMOL/L (ref 132–146)
T4 FREE: 1.25 NG/DL (ref 0.93–1.7)
TOTAL PROTEIN: 7.2 G/DL (ref 6.4–8.3)
TRIGL SERPL-MCNC: 99 MG/DL (ref 0–149)
TSH SERPL DL<=0.05 MIU/L-ACNC: 2.19 UIU/ML (ref 0.27–4.2)
VITAMIN B-12: 808 PG/ML (ref 211–946)
VITAMIN D 25-HYDROXY: 45 NG/ML (ref 30–100)
VLDLC SERPL CALC-MCNC: 20 MG/DL
WBC # BLD: 5.5 E9/L (ref 4.5–11.5)

## 2022-09-15 PROCEDURE — 99214 OFFICE O/P EST MOD 30 MIN: CPT | Performed by: FAMILY MEDICINE

## 2022-09-15 ASSESSMENT — PATIENT HEALTH QUESTIONNAIRE - PHQ9
SUM OF ALL RESPONSES TO PHQ9 QUESTIONS 1 & 2: 0
2. FEELING DOWN, DEPRESSED OR HOPELESS: 0
SUM OF ALL RESPONSES TO PHQ QUESTIONS 1-9: 0
1. LITTLE INTEREST OR PLEASURE IN DOING THINGS: 0
SUM OF ALL RESPONSES TO PHQ QUESTIONS 1-9: 0

## 2022-09-15 ASSESSMENT — LIFESTYLE VARIABLES: HOW OFTEN DO YOU HAVE A DRINK CONTAINING ALCOHOL: NEVER

## 2022-09-19 DIAGNOSIS — R97.20 ELEVATED PSA: ICD-10-CM

## 2022-09-19 RX ORDER — FINASTERIDE 5 MG/1
TABLET, FILM COATED ORAL
Qty: 90 TABLET | Refills: 5 | OUTPATIENT
Start: 2022-09-19

## 2022-09-19 RX ORDER — FINASTERIDE 5 MG/1
TABLET, FILM COATED ORAL
Qty: 90 TABLET | Refills: 5
Start: 2022-09-19

## 2022-09-19 ASSESSMENT — ENCOUNTER SYMPTOMS
RHINORRHEA: 0
BACK PAIN: 0
TROUBLE SWALLOWING: 0
FACIAL SWELLING: 0
STRIDOR: 0
VOMITING: 0
EYE ITCHING: 0
SINUS PRESSURE: 0
CONSTIPATION: 0
DIARRHEA: 0
EYE DISCHARGE: 0
SHORTNESS OF BREATH: 0
NAUSEA: 0
CHOKING: 0
EYE PAIN: 0
ABDOMINAL PAIN: 0
SORE THROAT: 0
ANAL BLEEDING: 0
APNEA: 0
VOICE CHANGE: 0
PHOTOPHOBIA: 0
EYE REDNESS: 0
BLOOD IN STOOL: 0
CHEST TIGHTNESS: 0
RECTAL PAIN: 0
COUGH: 0
COLOR CHANGE: 0
ABDOMINAL DISTENTION: 0
WHEEZING: 0

## 2022-09-19 NOTE — PROGRESS NOTES
Jorge Luis Enriquez is a 61 y.o. male  . Subjective:      Feeling well overall. Doing well. Has followed up with urology. Is due for blood work. Blood pressures been well controlled. Has been working out and exercising and watching diet. Review of Systems   Constitutional:  Negative for activity change, appetite change, chills, diaphoresis, fatigue, fever and unexpected weight change. HENT:  Negative for congestion, dental problem, drooling, ear discharge, ear pain, facial swelling, hearing loss, mouth sores, nosebleeds, postnasal drip, rhinorrhea, sinus pressure, sneezing, sore throat, tinnitus, trouble swallowing and voice change. Eyes:  Negative for photophobia, pain, discharge, redness, itching and visual disturbance. Respiratory:  Negative for apnea, cough, choking, chest tightness, shortness of breath, wheezing and stridor. Cardiovascular:  Negative for chest pain, palpitations and leg swelling. Gastrointestinal:  Negative for abdominal distention, abdominal pain, anal bleeding, blood in stool, constipation, diarrhea, nausea, rectal pain and vomiting. Endocrine: Negative for cold intolerance, heat intolerance, polydipsia, polyphagia and polyuria. Genitourinary:  Negative for decreased urine volume, difficulty urinating, dysuria, enuresis, flank pain, frequency, genital sores, hematuria, penile discharge, penile pain, penile swelling, scrotal swelling, testicular pain and urgency. Musculoskeletal:  Negative for arthralgias, back pain, gait problem, joint swelling, myalgias, neck pain and neck stiffness. Skin:  Negative for color change, pallor, rash and wound. Allergic/Immunologic: Negative for environmental allergies, food allergies and immunocompromised state. Neurological:  Negative for dizziness, tremors, seizures, syncope, facial asymmetry, speech difficulty, weakness, light-headedness, numbness and headaches. Hematological:  Negative for adenopathy.  Does not bruise/bleed easily. Psychiatric/Behavioral:  Negative for agitation, behavioral problems, confusion, decreased concentration, dysphoric mood, hallucinations, self-injury, sleep disturbance and suicidal ideas. The patient is not nervous/anxious and is not hyperactive. No past medical history on file. Social History     Socioeconomic History    Marital status:      Spouse name: Not on file    Number of children: 0    Years of education: Not on file    Highest education level: Not on file   Occupational History    Not on file   Tobacco Use    Smoking status: Never    Smokeless tobacco: Never   Vaping Use    Vaping Use: Never used   Substance and Sexual Activity    Alcohol use: No     Comment: beer once a year    Drug use: No    Sexual activity: Yes     Partners: Female   Other Topics Concern    Not on file   Social History Narrative    Not on file     Social Determinants of Health     Financial Resource Strain: Low Risk     Difficulty of Paying Living Expenses: Not hard at all   Food Insecurity: No Food Insecurity    Worried About Running Out of Food in the Last Year: Never true    Ran Out of Food in the Last Year: Never true   Transportation Needs: Not on file   Physical Activity: Not on file   Stress: Not on file   Social Connections: Not on file   Intimate Partner Violence: Not on file   Housing Stability: Not on file       No family history on file. Current Outpatient Medications on File Prior to Visit   Medication Sig Dispense Refill    finasteride (PROSCAR) 5 MG tablet take 1 tablet by mouth once daily 30 tablet 5    azelastine (ASTELIN) 0.1 % nasal spray 2 sprays by Nasal route 2 times daily Use in each nostril as directed 120 mL 5    mometasone-formoterol (DULERA) 200-5 MCG/ACT inhaler Inhale 2 puffs into the lungs 2 times daily Rinse mouth after using.  1 each 5    vitamin D3 (CHOLECALCIFEROL) 25 MCG (1000 UT) TABS tablet Take 1 tablet by mouth daily 30 tablet 0    albuterol-ipratropium (COMBIVENT RESPIMAT)  MCG/ACT AERS inhaler Inhale 1 puff into the lungs every 6 hours 5 each 0    ascorbic acid (VITAMIN C) 500 MG tablet Take 1 tablet by mouth daily 30 tablet 3    zinc sulfate (ZINCATE) 220 (50 Zn) MG capsule Take 1 capsule by mouth daily 30 capsule 3     No current facility-administered medications on file prior to visit. No Known Allergies    I have reviewed his allergies, medications, problem list, medical,social and family history and have updated as needed in the electronic medical record. Objective:     Physical Exam  Vitals and nursing note reviewed. Constitutional:       General: He is not in acute distress. Appearance: He is well-developed. He is not diaphoretic. HENT:      Head: Normocephalic and atraumatic. Right Ear: External ear normal.      Left Ear: External ear normal.      Nose: Nose normal.      Mouth/Throat:      Pharynx: No oropharyngeal exudate. Eyes:      General: No scleral icterus. Right eye: No discharge. Left eye: No discharge. Conjunctiva/sclera: Conjunctivae normal.      Pupils: Pupils are equal, round, and reactive to light. Neck:      Thyroid: No thyromegaly. Vascular: No JVD. Trachea: No tracheal deviation. Cardiovascular:      Rate and Rhythm: Normal rate and regular rhythm. Heart sounds: Normal heart sounds. No murmur heard. No friction rub. No gallop. Pulmonary:      Effort: Pulmonary effort is normal. No respiratory distress. Breath sounds: Normal breath sounds. No stridor. No wheezing or rales. Chest:      Chest wall: No tenderness. Abdominal:      General: Bowel sounds are normal. There is no distension. Palpations: Abdomen is soft. There is no mass. Tenderness: There is no abdominal tenderness. There is no guarding or rebound. Genitourinary:     Comments: Deferred by patient   Musculoskeletal:         General: No tenderness. Normal range of motion.       Cervical back: Normal range of motion and neck supple. Lymphadenopathy:      Cervical: No cervical adenopathy. Skin:     General: Skin is warm and dry. Coloration: Skin is not pale. Findings: No erythema or rash. Neurological:      Mental Status: He is alert and oriented to person, place, and time. Cranial Nerves: No cranial nerve deficit. Motor: No abnormal muscle tone. Coordination: Coordination normal.      Deep Tendon Reflexes: Reflexes are normal and symmetric. Reflexes normal.   Psychiatric:         Behavior: Behavior normal.         Thought Content: Thought content normal.         Judgment: Judgment normal.       Assessment / Plan:   Janak Mendoza was seen today for follow-up. Diagnoses and all orders for this visit:    IFG (impaired fasting glucose)  -     Hemoglobin A1C; Future    Mixed hyperlipidemia  -     Lipid Panel; Future    Screening for malignant neoplasm of prostate  -     PSA Screening; Future    Chronic fatigue  -     CBC; Future  -     Comprehensive Metabolic Panel; Future  -     TSH; Future  -     T4, Free; Future  -     Vitamin B12 & Folate; Future    Vitamin D deficiency  -     Vitamin D 25 Hydroxy; Future    Elevated PSA  -     finasteride (PROSCAR) 5 MG tablet; take 1 tablet by mouth once daily    This is a 33-minute visit  Reviewed healthmaintenance report. Patient is aware of deficiencies and suggested preventative tests.

## 2023-03-15 DIAGNOSIS — R73.01 IFG (IMPAIRED FASTING GLUCOSE): Primary | ICD-10-CM

## 2023-03-15 DIAGNOSIS — R53.82 CHRONIC FATIGUE: ICD-10-CM

## 2023-03-15 DIAGNOSIS — R53.83 FATIGUE, UNSPECIFIED TYPE: ICD-10-CM

## 2023-03-15 DIAGNOSIS — E78.2 MIXED HYPERLIPIDEMIA: ICD-10-CM

## 2023-03-20 ENCOUNTER — OFFICE VISIT (OUTPATIENT)
Dept: FAMILY MEDICINE CLINIC | Age: 58
End: 2023-03-20

## 2023-03-20 VITALS
WEIGHT: 212 LBS | BODY MASS INDEX: 28.1 KG/M2 | OXYGEN SATURATION: 98 % | HEART RATE: 67 BPM | RESPIRATION RATE: 18 BRPM | DIASTOLIC BLOOD PRESSURE: 76 MMHG | SYSTOLIC BLOOD PRESSURE: 120 MMHG | HEIGHT: 73 IN

## 2023-03-20 DIAGNOSIS — R73.01 IFG (IMPAIRED FASTING GLUCOSE): ICD-10-CM

## 2023-03-20 DIAGNOSIS — E78.2 MIXED HYPERLIPIDEMIA: ICD-10-CM

## 2023-03-20 DIAGNOSIS — R53.82 CHRONIC FATIGUE: ICD-10-CM

## 2023-03-20 DIAGNOSIS — Z12.5 SCREENING FOR MALIGNANT NEOPLASM OF PROSTATE: Primary | ICD-10-CM

## 2023-03-20 DIAGNOSIS — J30.1 SEASONAL ALLERGIC RHINITIS DUE TO POLLEN: ICD-10-CM

## 2023-03-20 LAB
ALBUMIN SERPL-MCNC: 4.5 G/DL (ref 3.5–5.2)
ALP SERPL-CCNC: 74 U/L (ref 40–129)
ALT SERPL-CCNC: 19 U/L (ref 0–40)
ANION GAP SERPL CALCULATED.3IONS-SCNC: 11 MMOL/L (ref 7–16)
AST SERPL-CCNC: 22 U/L (ref 0–39)
BASOPHILS # BLD: 0.06 E9/L (ref 0–0.2)
BASOPHILS NFR BLD: 1 % (ref 0–2)
BILIRUB SERPL-MCNC: 1.1 MG/DL (ref 0–1.2)
BUN SERPL-MCNC: 20 MG/DL (ref 6–20)
CALCIUM SERPL-MCNC: 9.4 MG/DL (ref 8.6–10.2)
CHLORIDE SERPL-SCNC: 105 MMOL/L (ref 98–107)
CHOLESTEROL, TOTAL: 167 MG/DL (ref 0–199)
CO2 SERPL-SCNC: 26 MMOL/L (ref 22–29)
CREAT SERPL-MCNC: 0.9 MG/DL (ref 0.7–1.2)
EOSINOPHIL # BLD: 0.19 E9/L (ref 0.05–0.5)
EOSINOPHIL NFR BLD: 3.3 % (ref 0–6)
ERYTHROCYTE [DISTWIDTH] IN BLOOD BY AUTOMATED COUNT: 13.8 FL (ref 11.5–15)
GLUCOSE SERPL-MCNC: 90 MG/DL (ref 74–99)
HBA1C MFR BLD: 5.7 % (ref 4–5.6)
HCT VFR BLD AUTO: 48.4 % (ref 37–54)
HDLC SERPL-MCNC: 39 MG/DL
HGB BLD-MCNC: 15.2 G/DL (ref 12.5–16.5)
IMM GRANULOCYTES # BLD: 0.02 E9/L
IMM GRANULOCYTES NFR BLD: 0.3 % (ref 0–5)
LDLC SERPL CALC-MCNC: 106 MG/DL (ref 0–99)
LYMPHOCYTES # BLD: 1.47 E9/L (ref 1.5–4)
LYMPHOCYTES NFR BLD: 25.5 % (ref 20–42)
MCH RBC QN AUTO: 28.5 PG (ref 26–35)
MCHC RBC AUTO-ENTMCNC: 31.4 % (ref 32–34.5)
MCV RBC AUTO: 90.6 FL (ref 80–99.9)
MONOCYTES # BLD: 0.79 E9/L (ref 0.1–0.95)
MONOCYTES NFR BLD: 13.7 % (ref 2–12)
NEUTROPHILS # BLD: 3.24 E9/L (ref 1.8–7.3)
NEUTS SEG NFR BLD: 56.2 % (ref 43–80)
PLATELET # BLD AUTO: 284 E9/L (ref 130–450)
PMV BLD AUTO: 10.7 FL (ref 7–12)
POTASSIUM SERPL-SCNC: 4.9 MMOL/L (ref 3.5–5)
PROT SERPL-MCNC: 7.2 G/DL (ref 6.4–8.3)
RBC # BLD AUTO: 5.34 E12/L (ref 3.8–5.8)
SODIUM SERPL-SCNC: 142 MMOL/L (ref 132–146)
T4 FREE SERPL-MCNC: 1.13 NG/DL (ref 0.93–1.7)
TRIGL SERPL-MCNC: 112 MG/DL (ref 0–149)
TSH SERPL-MCNC: 2.77 UIU/ML (ref 0.27–4.2)
VLDLC SERPL CALC-MCNC: 22 MG/DL
WBC # BLD: 5.8 E9/L (ref 4.5–11.5)

## 2023-03-20 RX ORDER — IPRATROPIUM BROMIDE 42 UG/1
2 SPRAY, METERED NASAL 4 TIMES DAILY
Qty: 15 ML | Refills: 4 | Status: SHIPPED | OUTPATIENT
Start: 2023-03-20

## 2023-03-20 SDOH — ECONOMIC STABILITY: FOOD INSECURITY: WITHIN THE PAST 12 MONTHS, THE FOOD YOU BOUGHT JUST DIDN'T LAST AND YOU DIDN'T HAVE MONEY TO GET MORE.: PATIENT DECLINED

## 2023-03-20 SDOH — ECONOMIC STABILITY: HOUSING INSECURITY
IN THE LAST 12 MONTHS, WAS THERE A TIME WHEN YOU DID NOT HAVE A STEADY PLACE TO SLEEP OR SLEPT IN A SHELTER (INCLUDING NOW)?: PATIENT REFUSED

## 2023-03-20 SDOH — ECONOMIC STABILITY: INCOME INSECURITY: HOW HARD IS IT FOR YOU TO PAY FOR THE VERY BASICS LIKE FOOD, HOUSING, MEDICAL CARE, AND HEATING?: PATIENT DECLINED

## 2023-03-20 SDOH — ECONOMIC STABILITY: FOOD INSECURITY: WITHIN THE PAST 12 MONTHS, YOU WORRIED THAT YOUR FOOD WOULD RUN OUT BEFORE YOU GOT MONEY TO BUY MORE.: PATIENT DECLINED

## 2023-03-20 ASSESSMENT — PATIENT HEALTH QUESTIONNAIRE - PHQ9
SUM OF ALL RESPONSES TO PHQ QUESTIONS 1-9: 0
SUM OF ALL RESPONSES TO PHQ QUESTIONS 1-9: 0
2. FEELING DOWN, DEPRESSED OR HOPELESS: 0
1. LITTLE INTEREST OR PLEASURE IN DOING THINGS: 0
SUM OF ALL RESPONSES TO PHQ9 QUESTIONS 1 & 2: 0
SUM OF ALL RESPONSES TO PHQ QUESTIONS 1-9: 0
SUM OF ALL RESPONSES TO PHQ QUESTIONS 1-9: 0

## 2023-03-20 NOTE — PROGRESS NOTES
headaches. Hematological:  Negative for adenopathy. Does not bruise/bleed easily. Psychiatric/Behavioral:  Negative for agitation, behavioral problems, confusion, decreased concentration, dysphoric mood, hallucinations, self-injury, sleep disturbance and suicidal ideas. The patient is not nervous/anxious and is not hyperactive. No past medical history on file. Social History     Socioeconomic History    Marital status:      Spouse name: Not on file    Number of children: 0    Years of education: Not on file    Highest education level: Not on file   Occupational History    Not on file   Tobacco Use    Smoking status: Never    Smokeless tobacco: Never   Vaping Use    Vaping Use: Never used   Substance and Sexual Activity    Alcohol use: No     Comment: beer once a year    Drug use: No    Sexual activity: Yes     Partners: Female   Other Topics Concern    Not on file   Social History Narrative    Not on file     Social Determinants of Health     Financial Resource Strain: Unknown    Difficulty of Paying Living Expenses: Patient refused   Food Insecurity: Unknown    Worried About [address] in the Last Year: Patient refused    [address] in the Last Year: Patient refused   Transportation Needs: Unknown    Lack of Transportation (Medical): Not on file    Lack of Transportation (Non-Medical): Patient refused   Physical Activity: Not on file   Stress: Not on file   Social Connections: Not on file   Intimate Partner Violence: Not on file   Housing Stability: Unknown    Unable to Pay for Housing in the Last Year: Not on file    Number of Places Lived in the Last Year: Not on file    Unstable Housing in the Last Year: Patient refused       No family history on file.     Current Outpatient Medications on File Prior to Visit   Medication Sig Dispense Refill    finasteride (PROSCAR) 5 MG tablet take 1 tablet by mouth once daily 90 tablet 5    azelastine (ASTELIN) 0.1 % nasal spray 2 sprays by

## 2023-03-22 ASSESSMENT — ENCOUNTER SYMPTOMS
EYE DISCHARGE: 0
COUGH: 0
RECTAL PAIN: 0
RHINORRHEA: 0
COLOR CHANGE: 0
FACIAL SWELLING: 0
SINUS PRESSURE: 0
VOICE CHANGE: 0
CONSTIPATION: 0
APNEA: 0
WHEEZING: 0
VOMITING: 0
CHEST TIGHTNESS: 0
TROUBLE SWALLOWING: 0
EYE PAIN: 0
CHOKING: 0
BACK PAIN: 0
ANAL BLEEDING: 0
NAUSEA: 0
EYE ITCHING: 0
BLOOD IN STOOL: 0
ABDOMINAL PAIN: 0
ABDOMINAL DISTENTION: 0
STRIDOR: 0
DIARRHEA: 0
SHORTNESS OF BREATH: 0
EYE REDNESS: 0
PHOTOPHOBIA: 0
SORE THROAT: 0

## 2023-04-18 DIAGNOSIS — R97.20 ELEVATED PSA: ICD-10-CM

## 2023-04-18 RX ORDER — FINASTERIDE 5 MG/1
TABLET, FILM COATED ORAL
Qty: 90 TABLET | Refills: 5 | Status: SHIPPED | OUTPATIENT
Start: 2023-04-18

## 2023-09-25 ENCOUNTER — OFFICE VISIT (OUTPATIENT)
Dept: FAMILY MEDICINE CLINIC | Age: 58
End: 2023-09-25
Payer: COMMERCIAL

## 2023-09-25 VITALS
RESPIRATION RATE: 18 BRPM | DIASTOLIC BLOOD PRESSURE: 80 MMHG | WEIGHT: 204 LBS | OXYGEN SATURATION: 98 % | HEIGHT: 73 IN | HEART RATE: 56 BPM | BODY MASS INDEX: 27.04 KG/M2 | SYSTOLIC BLOOD PRESSURE: 126 MMHG

## 2023-09-25 DIAGNOSIS — R53.83 FATIGUE, UNSPECIFIED TYPE: ICD-10-CM

## 2023-09-25 DIAGNOSIS — R31.29 OTHER MICROSCOPIC HEMATURIA: ICD-10-CM

## 2023-09-25 DIAGNOSIS — E53.8 VITAMIN B 12 DEFICIENCY: ICD-10-CM

## 2023-09-25 DIAGNOSIS — E78.2 MIXED HYPERLIPIDEMIA: ICD-10-CM

## 2023-09-25 DIAGNOSIS — R35.0 BENIGN PROSTATIC HYPERPLASIA WITH URINARY FREQUENCY: ICD-10-CM

## 2023-09-25 DIAGNOSIS — M25.551 RIGHT HIP PAIN: Primary | ICD-10-CM

## 2023-09-25 DIAGNOSIS — R10.9 FLANK PAIN: ICD-10-CM

## 2023-09-25 DIAGNOSIS — Z12.5 SCREENING FOR MALIGNANT NEOPLASM OF PROSTATE: ICD-10-CM

## 2023-09-25 DIAGNOSIS — N40.1 BENIGN PROSTATIC HYPERPLASIA WITH URINARY FREQUENCY: ICD-10-CM

## 2023-09-25 DIAGNOSIS — R73.01 IFG (IMPAIRED FASTING GLUCOSE): ICD-10-CM

## 2023-09-25 DIAGNOSIS — R97.20 ELEVATED PSA: ICD-10-CM

## 2023-09-25 PROCEDURE — 99214 OFFICE O/P EST MOD 30 MIN: CPT | Performed by: FAMILY MEDICINE

## 2023-09-26 DIAGNOSIS — Z12.5 SCREENING FOR MALIGNANT NEOPLASM OF PROSTATE: ICD-10-CM

## 2023-09-26 DIAGNOSIS — E53.8 VITAMIN B 12 DEFICIENCY: ICD-10-CM

## 2023-09-26 DIAGNOSIS — R31.29 OTHER MICROSCOPIC HEMATURIA: ICD-10-CM

## 2023-09-26 DIAGNOSIS — R53.83 FATIGUE, UNSPECIFIED TYPE: ICD-10-CM

## 2023-09-26 DIAGNOSIS — R73.01 IFG (IMPAIRED FASTING GLUCOSE): ICD-10-CM

## 2023-09-26 LAB
ABSOLUTE IMMATURE GRANULOCYTE: 0.04 K/UL (ref 0–0.58)
BASOPHILS ABSOLUTE: 0.06 K/UL (ref 0–0.2)
BASOPHILS RELATIVE PERCENT: 1 % (ref 0–2)
BILIRUBIN URINE: NEGATIVE
COLOR: YELLOW
EOSINOPHILS ABSOLUTE: 0.19 K/UL (ref 0.05–0.5)
EOSINOPHILS RELATIVE PERCENT: 3 % (ref 0–6)
GLUCOSE URINE: NEGATIVE MG/DL
HCT VFR BLD CALC: 46.2 % (ref 37–54)
HEMOGLOBIN: 14.6 G/DL (ref 12.5–16.5)
IMMATURE GRANULOCYTES: 1 % (ref 0–5)
KETONES, URINE: NEGATIVE MG/DL
LEUKOCYTE ESTERASE, URINE: NEGATIVE
LYMPHOCYTES ABSOLUTE: 1.21 K/UL (ref 1.5–4)
LYMPHOCYTES RELATIVE PERCENT: 16 % (ref 20–42)
MCH RBC QN AUTO: 28.4 PG (ref 26–35)
MCHC RBC AUTO-ENTMCNC: 31.6 G/DL (ref 32–34.5)
MCV RBC AUTO: 89.9 FL (ref 80–99.9)
MONOCYTES ABSOLUTE: 0.78 K/UL (ref 0.1–0.95)
MONOCYTES RELATIVE PERCENT: 10 % (ref 2–12)
NEUTROPHILS ABSOLUTE: 5.23 K/UL (ref 1.8–7.3)
NEUTROPHILS RELATIVE PERCENT: 70 % (ref 43–80)
NITRITE, URINE: NEGATIVE
PDW BLD-RTO: 13.4 % (ref 11.5–15)
PH UA: 6 (ref 5–9)
PLATELET # BLD: 242 K/UL (ref 130–450)
PMV BLD AUTO: 10.3 FL (ref 7–12)
PROTEIN UA: NEGATIVE MG/DL
RBC # BLD: 5.14 M/UL (ref 3.8–5.8)
RBC UA: ABNORMAL /HPF
SPECIFIC GRAVITY UA: <1.005 (ref 1–1.03)
TURBIDITY: CLEAR
URINE HGB: ABNORMAL
UROBILINOGEN, URINE: 0.2 EU/DL (ref 0–1)
WBC # BLD: 7.5 K/UL (ref 4.5–11.5)
WBC UA: ABNORMAL /HPF

## 2023-09-27 LAB
ALBUMIN SERPL-MCNC: 4.2 G/DL (ref 3.5–5.2)
ALP BLD-CCNC: 79 U/L (ref 40–129)
ALT SERPL-CCNC: 25 U/L (ref 0–40)
ANION GAP SERPL CALCULATED.3IONS-SCNC: 14 MMOL/L (ref 7–16)
AST SERPL-CCNC: 22 U/L (ref 0–39)
BILIRUB SERPL-MCNC: 0.9 MG/DL (ref 0–1.2)
BUN BLDV-MCNC: 13 MG/DL (ref 6–20)
CALCIUM SERPL-MCNC: 9.3 MG/DL (ref 8.6–10.2)
CHLORIDE BLD-SCNC: 102 MMOL/L (ref 98–107)
CO2: 22 MMOL/L (ref 22–29)
CREAT SERPL-MCNC: 1 MG/DL (ref 0.7–1.2)
FOLATE: 13.3 NG/ML (ref 4.8–24.2)
GFR SERPL CREATININE-BSD FRML MDRD: >60 ML/MIN/1.73M2
GLUCOSE BLD-MCNC: 100 MG/DL (ref 74–99)
HBA1C MFR BLD: 5.7 % (ref 4–5.6)
POTASSIUM SERPL-SCNC: 4.8 MMOL/L (ref 3.5–5)
PROSTATE SPECIFIC ANTIGEN: 2.32 NG/ML (ref 0–4)
SODIUM BLD-SCNC: 138 MMOL/L (ref 132–146)
T4 FREE: 1.5 NG/DL (ref 0.9–1.7)
TOTAL PROTEIN: 6.8 G/DL (ref 6.4–8.3)
TSH SERPL DL<=0.05 MIU/L-ACNC: 2.25 UIU/ML (ref 0.27–4.2)
VITAMIN B-12: 1464 PG/ML (ref 211–946)

## 2023-09-28 LAB
CULTURE: NO GROWTH
Lab: NORMAL
SPECIMEN DESCRIPTION: NORMAL

## 2023-09-29 RX ORDER — FINASTERIDE 5 MG/1
5 TABLET, FILM COATED ORAL DAILY
Qty: 90 TABLET | Refills: 5 | Status: SHIPPED | OUTPATIENT
Start: 2023-09-29

## 2023-09-29 ASSESSMENT — ENCOUNTER SYMPTOMS
NAUSEA: 0
SINUS PRESSURE: 0
ABDOMINAL PAIN: 0
VOMITING: 0
CHEST TIGHTNESS: 0
WHEEZING: 0
ANAL BLEEDING: 0
BLOOD IN STOOL: 0
RHINORRHEA: 0
EYE ITCHING: 0
CHOKING: 0
VOICE CHANGE: 0
COLOR CHANGE: 0
FACIAL SWELLING: 0
SORE THROAT: 0
RECTAL PAIN: 0
TROUBLE SWALLOWING: 0
EYE PAIN: 0
PHOTOPHOBIA: 0
STRIDOR: 0
EYE REDNESS: 0
BACK PAIN: 0
CONSTIPATION: 0
COUGH: 0
EYE DISCHARGE: 0
ABDOMINAL DISTENTION: 0
SHORTNESS OF BREATH: 0
DIARRHEA: 0
APNEA: 0

## 2023-09-29 NOTE — PROGRESS NOTES
Comprehensive Metabolic Panel; Future  -     TSH; Future  -     T4, Free; Future    IFG (impaired fasting glucose)  -     Hemoglobin A1C; Future    Screening for malignant neoplasm of prostate  -     PSA Screening; Future    Benign prostatic hyperplasia with urinary frequency  -     External Referral To Urology  -     External Referral To Urology  -     finasteride (PROSCAR) 5 MG tablet; Take 1 tablet by mouth daily    Elevated PSA  -     External Referral To Urology  -     External Referral To Urology        Reviewed healthmaintenance report. Patient is aware of deficiencies and suggested preventative tests.

## 2023-10-15 ENCOUNTER — APPOINTMENT (OUTPATIENT)
Dept: GENERAL RADIOLOGY | Age: 58
End: 2023-10-15
Payer: COMMERCIAL

## 2023-10-15 ENCOUNTER — HOSPITAL ENCOUNTER (OUTPATIENT)
Age: 58
Setting detail: OBSERVATION
Discharge: ANOTHER HOSPITAL | End: 2023-10-16
Attending: EMERGENCY MEDICINE | Admitting: INTERNAL MEDICINE
Payer: COMMERCIAL

## 2023-10-15 ENCOUNTER — APPOINTMENT (OUTPATIENT)
Dept: CT IMAGING | Age: 58
End: 2023-10-15
Payer: COMMERCIAL

## 2023-10-15 DIAGNOSIS — R42 ACUTE SEVERE VERTIGO: Primary | ICD-10-CM

## 2023-10-15 LAB
ALBUMIN SERPL-MCNC: 4.1 G/DL (ref 3.5–5.2)
ALP SERPL-CCNC: 72 U/L (ref 40–129)
ALT SERPL-CCNC: 17 U/L (ref 0–40)
ANION GAP SERPL CALCULATED.3IONS-SCNC: 13 MMOL/L (ref 7–16)
AST SERPL-CCNC: 19 U/L (ref 0–39)
BASOPHILS # BLD: 0.05 K/UL (ref 0–0.2)
BASOPHILS NFR BLD: 1 % (ref 0–2)
BILIRUB SERPL-MCNC: 1.2 MG/DL (ref 0–1.2)
BUN SERPL-MCNC: 18 MG/DL (ref 6–20)
CALCIUM SERPL-MCNC: 9.2 MG/DL (ref 8.6–10.2)
CHLORIDE SERPL-SCNC: 103 MMOL/L (ref 98–107)
CO2 SERPL-SCNC: 23 MMOL/L (ref 22–29)
CREAT SERPL-MCNC: 0.9 MG/DL (ref 0.7–1.2)
EOSINOPHIL # BLD: 0.18 K/UL (ref 0.05–0.5)
EOSINOPHILS RELATIVE PERCENT: 2 % (ref 0–6)
ERYTHROCYTE [DISTWIDTH] IN BLOOD BY AUTOMATED COUNT: 13.2 % (ref 11.5–15)
GFR SERPL CREATININE-BSD FRML MDRD: >60 ML/MIN/1.73M2
GLUCOSE SERPL-MCNC: 115 MG/DL (ref 74–99)
HCT VFR BLD AUTO: 45.5 % (ref 37–54)
HGB BLD-MCNC: 14.9 G/DL (ref 12.5–16.5)
IMM GRANULOCYTES # BLD AUTO: 0.04 K/UL (ref 0–0.58)
IMM GRANULOCYTES NFR BLD: 0 % (ref 0–5)
LYMPHOCYTES NFR BLD: 2.82 K/UL (ref 1.5–4)
LYMPHOCYTES RELATIVE PERCENT: 30 % (ref 20–42)
MAGNESIUM SERPL-MCNC: 2.1 MG/DL (ref 1.6–2.6)
MCH RBC QN AUTO: 28.8 PG (ref 26–35)
MCHC RBC AUTO-ENTMCNC: 32.7 G/DL (ref 32–34.5)
MCV RBC AUTO: 87.8 FL (ref 80–99.9)
MONOCYTES NFR BLD: 0.61 K/UL (ref 0.1–0.95)
MONOCYTES NFR BLD: 7 % (ref 2–12)
NEUTROPHILS NFR BLD: 61 % (ref 43–80)
NEUTS SEG NFR BLD: 5.72 K/UL (ref 1.8–7.3)
PLATELET # BLD AUTO: 307 K/UL (ref 130–450)
PMV BLD AUTO: 10.8 FL (ref 7–12)
POTASSIUM SERPL-SCNC: 4.3 MMOL/L (ref 3.5–5)
PROT SERPL-MCNC: 7.1 G/DL (ref 6.4–8.3)
RBC # BLD AUTO: 5.18 M/UL (ref 3.8–5.8)
SODIUM SERPL-SCNC: 139 MMOL/L (ref 132–146)
TROPONIN I SERPL HS-MCNC: 10 NG/L (ref 0–11)
WBC OTHER # BLD: 9.4 K/UL (ref 4.5–11.5)

## 2023-10-15 PROCEDURE — 71045 X-RAY EXAM CHEST 1 VIEW: CPT

## 2023-10-15 PROCEDURE — 93005 ELECTROCARDIOGRAM TRACING: CPT | Performed by: EMERGENCY MEDICINE

## 2023-10-15 PROCEDURE — 6370000000 HC RX 637 (ALT 250 FOR IP): Performed by: EMERGENCY MEDICINE

## 2023-10-15 PROCEDURE — 2580000003 HC RX 258: Performed by: EMERGENCY MEDICINE

## 2023-10-15 PROCEDURE — 80053 COMPREHEN METABOLIC PANEL: CPT

## 2023-10-15 PROCEDURE — 83735 ASSAY OF MAGNESIUM: CPT

## 2023-10-15 PROCEDURE — 85025 COMPLETE CBC W/AUTO DIFF WBC: CPT

## 2023-10-15 PROCEDURE — 99285 EMERGENCY DEPT VISIT HI MDM: CPT

## 2023-10-15 PROCEDURE — 70450 CT HEAD/BRAIN W/O DYE: CPT

## 2023-10-15 PROCEDURE — 84484 ASSAY OF TROPONIN QUANT: CPT

## 2023-10-15 RX ORDER — 0.9 % SODIUM CHLORIDE 0.9 %
1000 INTRAVENOUS SOLUTION INTRAVENOUS ONCE
Status: COMPLETED | OUTPATIENT
Start: 2023-10-15 | End: 2023-10-15

## 2023-10-15 RX ORDER — ONDANSETRON 2 MG/ML
4 INJECTION INTRAMUSCULAR; INTRAVENOUS ONCE
Status: DISCONTINUED | OUTPATIENT
Start: 2023-10-15 | End: 2023-10-15

## 2023-10-15 RX ORDER — MECLIZINE HCL 12.5 MG/1
25 TABLET ORAL ONCE
Status: COMPLETED | OUTPATIENT
Start: 2023-10-15 | End: 2023-10-15

## 2023-10-15 RX ADMIN — SODIUM CHLORIDE 1000 ML: 9 INJECTION, SOLUTION INTRAVENOUS at 20:16

## 2023-10-15 RX ADMIN — MECLIZINE 25 MG: 12.5 TABLET ORAL at 20:14

## 2023-10-15 ASSESSMENT — LIFESTYLE VARIABLES
HOW OFTEN DO YOU HAVE A DRINK CONTAINING ALCOHOL: NEVER
HOW MANY STANDARD DRINKS CONTAINING ALCOHOL DO YOU HAVE ON A TYPICAL DAY: PATIENT DOES NOT DRINK

## 2023-10-15 NOTE — ED PROVIDER NOTES
[address]        Pt Name: Rafat Reynoso  MRN: [id number]  [facility] [facility] 1965  Date of evaluation: 10/15/2023  Provider: Samantha Cheatham DO  PCP: Shantelle Smith DO  Note Started: 7:52 PM EDT 10/15/23    CHIEF COMPLAINT       Chief Complaint   Patient presents with    Dizziness     Dizziness since last with vomiting tonight. HISTORY OF PRESENT ILLNESS: 1 or more Elements   History From: Patient    Limitations to history : None    Rafat Reynoso is a 60 y.o. male who presents with concern for feelings of dizziness since last evening. He says it began around [id number] last evening before going to work. It was relatively persistent throughout the night, it is greatly improved whenever he lays down, slightly worse whenever he looks to the left. He has never felt like this before. He has not been sick at all recently, no numbness, weakness, tingling, no difficulty with ambulation. He has had 1 episode of nausea and vomiting. No recent cough, congestion, respiratory symptoms. No headache or vision changes. No other associated complaints. Nursing Notes were all reviewed and agreed with or any disagreements were addressed in the HPI. REVIEW OF SYSTEMS :      Positives and Pertinent negatives as per HPI.      SURGICAL HISTORY     Past Surgical History:   Procedure Laterality Date    CYST REMOVAL Left     as kid ganglion    HERNIA REPAIR Left     Inguinal    TONSILLECTOMY         CURRENTMEDICATIONS       Previous Medications    ASCORBIC ACID (VITAMIN C) 500 MG TABLET    Take 1 tablet by mouth daily    AZELASTINE (ASTELIN) 0.1 % NASAL SPRAY    2 sprays by Nasal route 2 times daily Use in each nostril as directed    FINASTERIDE (PROSCAR) 5 MG TABLET    Take 1 tablet by mouth daily    IPRATROPIUM (ATROVENT) 0.06 % NASAL SPRAY    2 sprays by Each Nostril route 4 times daily    VITAMIN D3 (CHOLECALCIFEROL) 25 MCG (1000 UT) TABS

## 2023-10-16 ENCOUNTER — APPOINTMENT (OUTPATIENT)
Dept: MRI IMAGING | Age: 58
End: 2023-10-16
Payer: COMMERCIAL

## 2023-10-16 ENCOUNTER — HOSPITAL ENCOUNTER (INPATIENT)
Age: 58
LOS: 1 days | Discharge: HOME | DRG: 149 | End: 2023-10-17
Attending: INTERNAL MEDICINE | Admitting: STUDENT IN AN ORGANIZED HEALTH CARE EDUCATION/TRAINING PROGRAM
Payer: COMMERCIAL

## 2023-10-16 ENCOUNTER — APPOINTMENT (OUTPATIENT)
Dept: ULTRASOUND IMAGING | Age: 58
End: 2023-10-16
Payer: COMMERCIAL

## 2023-10-16 ENCOUNTER — APPOINTMENT (OUTPATIENT)
Dept: CT IMAGING | Age: 58
End: 2023-10-16
Payer: COMMERCIAL

## 2023-10-16 VITALS
HEIGHT: 73 IN | SYSTOLIC BLOOD PRESSURE: 126 MMHG | HEART RATE: 56 BPM | DIASTOLIC BLOOD PRESSURE: 81 MMHG | BODY MASS INDEX: 25.18 KG/M2 | TEMPERATURE: 97.9 F | RESPIRATION RATE: 18 BRPM | WEIGHT: 190 LBS | OXYGEN SATURATION: 99 %

## 2023-10-16 PROBLEM — G93.89 MASS OF FRONTAL LOBE: Status: ACTIVE | Noted: 2023-10-16

## 2023-10-16 PROBLEM — G93.89 BRAIN MASS: Status: ACTIVE | Noted: 2023-10-16

## 2023-10-16 PROBLEM — G93.9 BRAIN LESION: Status: ACTIVE | Noted: 2023-10-16

## 2023-10-16 LAB
ALBUMIN SERPL-MCNC: 3.8 G/DL (ref 3.5–5.2)
ALP SERPL-CCNC: 64 U/L (ref 40–129)
ALT SERPL-CCNC: 16 U/L (ref 0–40)
ANION GAP SERPL CALCULATED.3IONS-SCNC: 10 MMOL/L (ref 7–16)
AST SERPL-CCNC: 15 U/L (ref 0–39)
BASOPHILS # BLD: 0.03 K/UL (ref 0–0.2)
BASOPHILS NFR BLD: 0 % (ref 0–2)
BILIRUB SERPL-MCNC: 1 MG/DL (ref 0–1.2)
BUN SERPL-MCNC: 14 MG/DL (ref 6–20)
CALCIUM SERPL-MCNC: 8.6 MG/DL (ref 8.6–10.2)
CHLORIDE SERPL-SCNC: 109 MMOL/L (ref 98–107)
CHOLEST SERPL-MCNC: 151 MG/DL
CO2 SERPL-SCNC: 23 MMOL/L (ref 22–29)
CREAT SERPL-MCNC: 0.9 MG/DL (ref 0.7–1.2)
EKG ATRIAL RATE: 51 BPM
EKG P AXIS: 54 DEGREES
EKG P-R INTERVAL: 168 MS
EKG Q-T INTERVAL: 432 MS
EKG QRS DURATION: 100 MS
EKG QTC CALCULATION (BAZETT): 398 MS
EKG R AXIS: 48 DEGREES
EKG T AXIS: 44 DEGREES
EKG VENTRICULAR RATE: 51 BPM
EOSINOPHIL # BLD: 0.13 K/UL (ref 0.05–0.5)
EOSINOPHILS RELATIVE PERCENT: 2 % (ref 0–6)
ERYTHROCYTE [DISTWIDTH] IN BLOOD BY AUTOMATED COUNT: 13.3 % (ref 11.5–15)
GFR SERPL CREATININE-BSD FRML MDRD: >60 ML/MIN/1.73M2
GLUCOSE BLD-MCNC: 106 MG/DL (ref 74–99)
GLUCOSE SERPL-MCNC: 121 MG/DL (ref 74–99)
HBA1C MFR BLD: 5.4 % (ref 4–5.6)
HCT VFR BLD AUTO: 41.1 % (ref 37–54)
HDLC SERPL-MCNC: 34 MG/DL
HGB BLD-MCNC: 13.3 G/DL (ref 12.5–16.5)
IMM GRANULOCYTES # BLD AUTO: 0.04 K/UL (ref 0–0.58)
IMM GRANULOCYTES NFR BLD: 1 % (ref 0–5)
LDLC SERPL CALC-MCNC: 97 MG/DL
LYMPHOCYTES NFR BLD: 1.73 K/UL (ref 1.5–4)
LYMPHOCYTES RELATIVE PERCENT: 21 % (ref 20–42)
MAGNESIUM SERPL-MCNC: 2.2 MG/DL (ref 1.6–2.6)
MCH RBC QN AUTO: 28.2 PG (ref 26–35)
MCHC RBC AUTO-ENTMCNC: 32.4 G/DL (ref 32–34.5)
MCV RBC AUTO: 87.3 FL (ref 80–99.9)
MONOCYTES NFR BLD: 0.62 K/UL (ref 0.1–0.95)
MONOCYTES NFR BLD: 7 % (ref 2–12)
NEUTROPHILS NFR BLD: 70 % (ref 43–80)
NEUTS SEG NFR BLD: 5.83 K/UL (ref 1.8–7.3)
PHOSPHATE SERPL-MCNC: 2.6 MG/DL (ref 2.5–4.5)
PLATELET # BLD AUTO: 266 K/UL (ref 130–450)
PMV BLD AUTO: 10.4 FL (ref 7–12)
POTASSIUM SERPL-SCNC: 4 MMOL/L (ref 3.5–5)
PROCALCITONIN SERPL-MCNC: 0.09 NG/ML (ref 0–0.08)
PROT SERPL-MCNC: 6.1 G/DL (ref 6.4–8.3)
RBC # BLD AUTO: 4.71 M/UL (ref 3.8–5.8)
SODIUM SERPL-SCNC: 142 MMOL/L (ref 132–146)
T4 FREE SERPL-MCNC: 1.2 NG/DL (ref 0.9–1.7)
TRIGL SERPL-MCNC: 99 MG/DL
TSH SERPL DL<=0.05 MIU/L-ACNC: 0.44 UIU/ML (ref 0.27–4.2)
VLDLC SERPL CALC-MCNC: 20 MG/DL
WBC OTHER # BLD: 8.4 K/UL (ref 4.5–11.5)

## 2023-10-16 PROCEDURE — 2580000003 HC RX 258: Performed by: STUDENT IN AN ORGANIZED HEALTH CARE EDUCATION/TRAINING PROGRAM

## 2023-10-16 PROCEDURE — 71260 CT THORAX DX C+: CPT

## 2023-10-16 PROCEDURE — 84100 ASSAY OF PHOSPHORUS: CPT

## 2023-10-16 PROCEDURE — 96361 HYDRATE IV INFUSION ADD-ON: CPT

## 2023-10-16 PROCEDURE — 84439 ASSAY OF FREE THYROXINE: CPT

## 2023-10-16 PROCEDURE — 74177 CT ABD & PELVIS W/CONTRAST: CPT

## 2023-10-16 PROCEDURE — 36415 COLL VENOUS BLD VENIPUNCTURE: CPT

## 2023-10-16 PROCEDURE — 85025 COMPLETE CBC W/AUTO DIFF WBC: CPT

## 2023-10-16 PROCEDURE — 6360000004 HC RX CONTRAST MEDICATION: Performed by: RADIOLOGY

## 2023-10-16 PROCEDURE — 93880 EXTRACRANIAL BILAT STUDY: CPT

## 2023-10-16 PROCEDURE — 6370000000 HC RX 637 (ALT 250 FOR IP): Performed by: INTERNAL MEDICINE

## 2023-10-16 PROCEDURE — 2060000000 HC ICU INTERMEDIATE R&B

## 2023-10-16 PROCEDURE — 70553 MRI BRAIN STEM W/O & W/DYE: CPT

## 2023-10-16 PROCEDURE — 2580000003 HC RX 258: Performed by: INTERNAL MEDICINE

## 2023-10-16 PROCEDURE — A9577 INJ MULTIHANCE: HCPCS | Performed by: RADIOLOGY

## 2023-10-16 PROCEDURE — 96372 THER/PROPH/DIAG INJ SC/IM: CPT

## 2023-10-16 PROCEDURE — 96360 HYDRATION IV INFUSION INIT: CPT

## 2023-10-16 PROCEDURE — 84443 ASSAY THYROID STIM HORMONE: CPT

## 2023-10-16 PROCEDURE — 6360000002 HC RX W HCPCS: Performed by: NURSE PRACTITIONER

## 2023-10-16 PROCEDURE — 6370000000 HC RX 637 (ALT 250 FOR IP): Performed by: NURSE PRACTITIONER

## 2023-10-16 PROCEDURE — 99222 1ST HOSP IP/OBS MODERATE 55: CPT | Performed by: NEUROLOGICAL SURGERY

## 2023-10-16 PROCEDURE — 80061 LIPID PANEL: CPT

## 2023-10-16 PROCEDURE — G0378 HOSPITAL OBSERVATION PER HR: HCPCS

## 2023-10-16 PROCEDURE — 84145 PROCALCITONIN (PCT): CPT

## 2023-10-16 PROCEDURE — 83735 ASSAY OF MAGNESIUM: CPT

## 2023-10-16 PROCEDURE — 82962 GLUCOSE BLOOD TEST: CPT

## 2023-10-16 PROCEDURE — 93010 ELECTROCARDIOGRAM REPORT: CPT | Performed by: INTERNAL MEDICINE

## 2023-10-16 PROCEDURE — 80053 COMPREHEN METABOLIC PANEL: CPT

## 2023-10-16 PROCEDURE — 6360000002 HC RX W HCPCS: Performed by: INTERNAL MEDICINE

## 2023-10-16 PROCEDURE — 83036 HEMOGLOBIN GLYCOSYLATED A1C: CPT

## 2023-10-16 RX ORDER — ASCORBIC ACID 500 MG
500 TABLET ORAL DAILY
Status: DISCONTINUED | OUTPATIENT
Start: 2023-10-16 | End: 2023-10-17 | Stop reason: HOSPADM

## 2023-10-16 RX ORDER — SODIUM CHLORIDE 0.9 % (FLUSH) 0.9 %
5-40 SYRINGE (ML) INJECTION EVERY 12 HOURS SCHEDULED
Status: DISCONTINUED | OUTPATIENT
Start: 2023-10-16 | End: 2023-10-17 | Stop reason: HOSPADM

## 2023-10-16 RX ORDER — SODIUM CHLORIDE 0.9 % (FLUSH) 0.9 %
5-40 SYRINGE (ML) INJECTION PRN
Status: DISCONTINUED | OUTPATIENT
Start: 2023-10-16 | End: 2023-10-16 | Stop reason: HOSPADM

## 2023-10-16 RX ORDER — ACETAMINOPHEN 325 MG/1
650 TABLET ORAL EVERY 6 HOURS PRN
Status: DISCONTINUED | OUTPATIENT
Start: 2023-10-16 | End: 2023-10-16 | Stop reason: HOSPADM

## 2023-10-16 RX ORDER — MECLIZINE HCL 12.5 MG/1
25 TABLET ORAL EVERY 6 HOURS SCHEDULED
Status: DISCONTINUED | OUTPATIENT
Start: 2023-10-16 | End: 2023-10-16 | Stop reason: HOSPADM

## 2023-10-16 RX ORDER — GLUCAGON 1 MG/ML
1 KIT INJECTION PRN
Status: DISCONTINUED | OUTPATIENT
Start: 2023-10-16 | End: 2023-10-16

## 2023-10-16 RX ORDER — POLYETHYLENE GLYCOL 3350 17 G/17G
17 POWDER, FOR SOLUTION ORAL DAILY PRN
Status: DISCONTINUED | OUTPATIENT
Start: 2023-10-16 | End: 2023-10-16 | Stop reason: HOSPADM

## 2023-10-16 RX ORDER — 0.9 % SODIUM CHLORIDE 0.9 %
1000 INTRAVENOUS SOLUTION INTRAVENOUS ONCE
Status: COMPLETED | OUTPATIENT
Start: 2023-10-16 | End: 2023-10-16

## 2023-10-16 RX ORDER — MAGNESIUM SULFATE 1 G/100ML
1000 INJECTION INTRAVENOUS PRN
Status: DISCONTINUED | OUTPATIENT
Start: 2023-10-16 | End: 2023-10-16 | Stop reason: HOSPADM

## 2023-10-16 RX ORDER — ONDANSETRON 4 MG/1
4 TABLET, ORALLY DISINTEGRATING ORAL EVERY 8 HOURS PRN
Status: DISCONTINUED | OUTPATIENT
Start: 2023-10-16 | End: 2023-10-17 | Stop reason: HOSPADM

## 2023-10-16 RX ORDER — ENOXAPARIN SODIUM 100 MG/ML
40 INJECTION SUBCUTANEOUS DAILY
Status: DISCONTINUED | OUTPATIENT
Start: 2023-10-16 | End: 2023-10-16 | Stop reason: HOSPADM

## 2023-10-16 RX ORDER — ACETAMINOPHEN 650 MG/1
650 SUPPOSITORY RECTAL EVERY 6 HOURS PRN
Status: DISCONTINUED | OUTPATIENT
Start: 2023-10-16 | End: 2023-10-16 | Stop reason: HOSPADM

## 2023-10-16 RX ORDER — SODIUM CHLORIDE 9 MG/ML
INJECTION, SOLUTION INTRAVENOUS PRN
Status: DISCONTINUED | OUTPATIENT
Start: 2023-10-16 | End: 2023-10-17 | Stop reason: HOSPADM

## 2023-10-16 RX ORDER — ACETAMINOPHEN 650 MG/1
650 SUPPOSITORY RECTAL EVERY 6 HOURS PRN
Status: DISCONTINUED | OUTPATIENT
Start: 2023-10-16 | End: 2023-10-17 | Stop reason: HOSPADM

## 2023-10-16 RX ORDER — FINASTERIDE 5 MG/1
5 TABLET, FILM COATED ORAL DAILY
Status: DISCONTINUED | OUTPATIENT
Start: 2023-10-16 | End: 2023-10-16 | Stop reason: HOSPADM

## 2023-10-16 RX ORDER — POTASSIUM CHLORIDE 20 MEQ/1
40 TABLET, EXTENDED RELEASE ORAL PRN
Status: DISCONTINUED | OUTPATIENT
Start: 2023-10-16 | End: 2023-10-16 | Stop reason: HOSPADM

## 2023-10-16 RX ORDER — POTASSIUM CHLORIDE 7.45 MG/ML
10 INJECTION INTRAVENOUS PRN
Status: DISCONTINUED | OUTPATIENT
Start: 2023-10-16 | End: 2023-10-16 | Stop reason: HOSPADM

## 2023-10-16 RX ORDER — IPRATROPIUM BROMIDE 42 UG/1
2 SPRAY, METERED NASAL 4 TIMES DAILY
Status: DISCONTINUED | OUTPATIENT
Start: 2023-10-16 | End: 2023-10-16 | Stop reason: CLARIF

## 2023-10-16 RX ORDER — ACETAMINOPHEN 325 MG/1
650 TABLET ORAL EVERY 6 HOURS PRN
Status: DISCONTINUED | OUTPATIENT
Start: 2023-10-16 | End: 2023-10-17 | Stop reason: HOSPADM

## 2023-10-16 RX ORDER — SODIUM CHLORIDE AND POTASSIUM CHLORIDE 300; 900 MG/100ML; MG/100ML
INJECTION, SOLUTION INTRAVENOUS CONTINUOUS
Status: DISCONTINUED | OUTPATIENT
Start: 2023-10-16 | End: 2023-10-16 | Stop reason: HOSPADM

## 2023-10-16 RX ORDER — FINASTERIDE 5 MG/1
5 TABLET, FILM COATED ORAL DAILY
Status: DISCONTINUED | OUTPATIENT
Start: 2023-10-16 | End: 2023-10-17 | Stop reason: HOSPADM

## 2023-10-16 RX ORDER — SODIUM CHLORIDE 0.9 % (FLUSH) 0.9 %
5-40 SYRINGE (ML) INJECTION PRN
Status: DISCONTINUED | OUTPATIENT
Start: 2023-10-16 | End: 2023-10-17 | Stop reason: HOSPADM

## 2023-10-16 RX ORDER — VITAMIN B COMPLEX
1000 TABLET ORAL DAILY
Status: DISCONTINUED | OUTPATIENT
Start: 2023-10-16 | End: 2023-10-17 | Stop reason: HOSPADM

## 2023-10-16 RX ORDER — SODIUM CHLORIDE 0.9 % (FLUSH) 0.9 %
5-40 SYRINGE (ML) INJECTION EVERY 12 HOURS SCHEDULED
Status: DISCONTINUED | OUTPATIENT
Start: 2023-10-16 | End: 2023-10-16

## 2023-10-16 RX ORDER — SODIUM CHLORIDE 9 MG/ML
INJECTION, SOLUTION INTRAVENOUS PRN
Status: DISCONTINUED | OUTPATIENT
Start: 2023-10-16 | End: 2023-10-16 | Stop reason: HOSPADM

## 2023-10-16 RX ORDER — ENOXAPARIN SODIUM 100 MG/ML
40 INJECTION SUBCUTANEOUS DAILY
Status: DISCONTINUED | OUTPATIENT
Start: 2023-10-16 | End: 2023-10-16

## 2023-10-16 RX ORDER — POLYETHYLENE GLYCOL 3350 17 G/17G
17 POWDER, FOR SOLUTION ORAL DAILY PRN
Status: DISCONTINUED | OUTPATIENT
Start: 2023-10-16 | End: 2023-10-17 | Stop reason: HOSPADM

## 2023-10-16 RX ORDER — DEXTROSE MONOHYDRATE 100 MG/ML
INJECTION, SOLUTION INTRAVENOUS CONTINUOUS PRN
Status: DISCONTINUED | OUTPATIENT
Start: 2023-10-16 | End: 2023-10-16

## 2023-10-16 RX ORDER — ONDANSETRON 2 MG/ML
4 INJECTION INTRAMUSCULAR; INTRAVENOUS EVERY 6 HOURS PRN
Status: DISCONTINUED | OUTPATIENT
Start: 2023-10-16 | End: 2023-10-17 | Stop reason: HOSPADM

## 2023-10-16 RX ADMIN — IOPAMIDOL 75 ML: 755 INJECTION, SOLUTION INTRAVENOUS at 09:49

## 2023-10-16 RX ADMIN — Medication 10 ML: at 21:27

## 2023-10-16 RX ADMIN — FINASTERIDE 5 MG: 5 TABLET, FILM COATED ORAL at 10:08

## 2023-10-16 RX ADMIN — IOPAMIDOL 18 ML: 755 INJECTION, SOLUTION INTRAVENOUS at 09:49

## 2023-10-16 RX ADMIN — ENOXAPARIN SODIUM 40 MG: 100 INJECTION SUBCUTANEOUS at 10:08

## 2023-10-16 RX ADMIN — SODIUM CHLORIDE 1000 ML: 9 INJECTION, SOLUTION INTRAVENOUS at 01:46

## 2023-10-16 RX ADMIN — POTASSIUM CHLORIDE AND SODIUM CHLORIDE: 900; 300 INJECTION, SOLUTION INTRAVENOUS at 07:34

## 2023-10-16 RX ADMIN — GADOBENATE DIMEGLUMINE 19 ML: 529 INJECTION, SOLUTION INTRAVENOUS at 08:21

## 2023-10-16 RX ADMIN — MECLIZINE 25 MG: 12.5 TABLET ORAL at 07:35

## 2023-10-16 ASSESSMENT — LIFESTYLE VARIABLES
HOW MANY STANDARD DRINKS CONTAINING ALCOHOL DO YOU HAVE ON A TYPICAL DAY: PATIENT DOES NOT DRINK
HOW OFTEN DO YOU HAVE A DRINK CONTAINING ALCOHOL: NEVER

## 2023-10-16 ASSESSMENT — PAIN - FUNCTIONAL ASSESSMENT
PAIN_FUNCTIONAL_ASSESSMENT: NONE - DENIES PAIN

## 2023-10-16 ASSESSMENT — PAIN SCALES - GENERAL: PAINLEVEL_OUTOF10: 0

## 2023-10-16 NOTE — PROGRESS NOTES
10/16/2023 05:42 AM    HCT 41.1 10/16/2023 05:42 AM     10/16/2023 05:42 AM    MCV 87.3 10/16/2023 05:42 AM    MCH 28.2 10/16/2023 05:42 AM    MCHC 32.4 10/16/2023 05:42 AM    RDW 13.3 10/16/2023 05:42 AM    METASPCT 0.9 10/03/2021 10:21 AM    LYMPHOPCT 21 10/16/2023 05:42 AM    MONOPCT 7 10/16/2023 05:42 AM    BASOPCT 0 10/16/2023 05:42 AM    MONOSABS 0.62 10/16/2023 05:42 AM    LYMPHSABS 1.73 10/16/2023 05:42 AM    EOSABS 0.13 10/16/2023 05:42 AM    BASOSABS 0.03 10/16/2023 05:42 AM     BMP:    Lab Results   Component Value Date/Time     10/15/2023 10:37 PM    K 4.3 10/15/2023 10:37 PM    K 3.9 10/04/2021 04:47 AM     10/15/2023 10:37 PM    CO2 23 10/15/2023 10:37 PM    BUN 18 10/15/2023 10:37 PM    LABALBU 4.1 10/15/2023 10:37 PM    LABALBU 4.4 10/18/2010 09:35 AM    CREATININE 0.9 10/15/2023 10:37 PM    CALCIUM 9.2 10/15/2023 10:37 PM    GFRAA >60 09/15/2022 04:14 PM    LABGLOM >60 10/15/2023 10:37 PM    GLUCOSE 115 10/15/2023 10:37 PM    GLUCOSE 104 10/18/2010 09:35 AM       Assessment:  Focal hyperattenuating lesion within the left frontal lobe of unclear etiology resulting in significant dizziness and ataxic gait     Plan:   Arrangements have been made for transfer to [facility] pending bed availability. Neurosurgical evaluation is necessary and the patient voices understanding and agreement. MRI is necessary as well and has been ordered in the event a bed is not procured this morning. Continue current therapy. See orders for further plan of care. More than 50% of my  time was spent at the bedside counseling/coordinating care with the patient and/or family with face to face contact. This time was spent reviewing notes and laboratory data as well as instructing and counseling the patient. Time I spent with the family or surrogate(s) is included only if the patient was incapable of providing the necessary information or participating in medical decisions.  I also discussed

## 2023-10-16 NOTE — H&P
Department of Internal Medicine  History and Physical    PCP:   Admitting Physician: Dr. Ion Elkins  Consultants:   Date of Service: 10/15/2023    CHIEF COMPLAINT:  vertigo    HISTORY OF PRESENT ILLNESS:    Patient is 42-year-old male who presented to the ED due to vertiginous symptoms. Patient states that symptoms started either Friday night or Saturday morning. It has continued until today. It is worse with positional changes. He has associated nausea. He did have episode of emesis. He does admit to occipital headache. Denies any previous history of stroke. Denies any chest pain. Denies any palpitations or racing heartbeats    PAST MEDICAL Hx:  No past medical history on file. PAST SURGICAL Hx:   Past Surgical History:   Procedure Laterality Date    CYST REMOVAL Left     as kid ganglion    HERNIA REPAIR Left     Inguinal    TONSILLECTOMY         FAMILY Hx:  No family history on file. HOME MEDICATIONS:  Prior to Admission medications    Medication Sig Start Date End Date Taking? Authorizing Provider   finasteride (PROSCAR) 5 MG tablet Take 1 tablet by mouth daily 9/29/23   Bryan GAVIN,    ipratropium (ATROVENT) 0.06 % nasal spray 2 sprays by Each Nostril route 4 times daily 3/20/23   Verlyn Homero, DO   azelastine (ASTELIN) 0.1 % nasal spray 2 sprays by Nasal route 2 times daily Use in each nostril as directed 11/22/21   Verlyn Homero, DO   zinc sulfate (ZINCATE) 220 (50 Zn) MG capsule Take 1 capsule by mouth daily 10/13/21 11/12/21  Verlyn Homero, DO   vitamin D3 (CHOLECALCIFEROL) 25 MCG (1000 UT) TABS tablet Take 1 tablet by mouth daily 10/13/21   Verlyn Homero, DO   ascorbic acid (VITAMIN C) 500 MG tablet Take 1 tablet by mouth daily 10/7/21   Florencio Amaral DO       ALLERGIES:  Patient has no known allergies.     SOCIAL Hx:  Social History     Socioeconomic History    Marital status:      Spouse name: Not on file    Number of children: 0    Years of education: Not on file

## 2023-10-16 NOTE — ED NOTES
Bloodwork sent to lab. Awaiting availability for XR. Called x 3 without answer.        Raymundo Nance RN  10/15/23 8149

## 2023-10-16 NOTE — H&P
Hospital Medicine History & Physical      PCP: Keya Saba DO    Date of Admission: 10/16/2023    Date of Service: Pt seen/examined on 10/16/2023 and Admitted to Inpatient with expected LOS greater than two midnights due to medical therapy. Chief Complaint:  vertigo and ataxia      History Of Present Illness:   58-year-old male who presented to [facility] as a transfer for the evaluation of left frontal mass on CT scan. 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 Patient had presented to [facility] due to vertiginous symptoms. Patient states that symptoms started either Friday night or Saturday morning. .  It is worse with positional changes. He has associated nausea. He did have episode of emesis. He does admit to occipital headache. Denies any previous history of stroke. Denies any chest pain. Denies any palpitations or racing heartbeats      On evaluation patient states his vertigo and headache and nausia has resolved. Neurosurgery and neurology has been consulted for the evaluation of 6 mm cavernoma  Past Medical History:      No past medical history on file. Past Surgical History:          Procedure Laterality Date    CYST REMOVAL Left     as kid ganglion    HERNIA REPAIR Left     Inguinal    TONSILLECTOMY         Medications Prior to Admission:      Prior to Admission medications    Medication Sig Start Date End Date Taking?  Authorizing Provider   finasteride (PROSCAR) 5 MG tablet Take 1 tablet by mouth daily 9/29/23   [place] GAVIN, DO   ipratropium (ATROVENT) 0.06 % nasal spray 2 sprays by Each Nostril route 4 times daily  Patient taking differently: 2 sprays by Each Nostril route 4 times daily as needed for Rhinitis 3/20/23   [place] GAVIN, DO   azelastine (ASTELIN) 0.1 % nasal spray 2 sprays by Nasal route 2 times daily Use in each nostril as directed  Patient taking differently: 2 sprays by Nasal route 2 times daily as needed for

## 2023-10-17 VITALS
SYSTOLIC BLOOD PRESSURE: 134 MMHG | WEIGHT: 190 LBS | HEART RATE: 52 BPM | RESPIRATION RATE: 18 BRPM | BODY MASS INDEX: 25.18 KG/M2 | DIASTOLIC BLOOD PRESSURE: 67 MMHG | OXYGEN SATURATION: 98 % | HEIGHT: 73 IN | TEMPERATURE: 97.8 F

## 2023-10-17 PROBLEM — H81.11 BENIGN PAROXYSMAL POSITIONAL VERTIGO OF RIGHT EAR: Status: ACTIVE | Noted: 2023-10-17

## 2023-10-17 LAB
25(OH)D3 SERPL-MCNC: 36 NG/ML (ref 30–100)
ALBUMIN SERPL-MCNC: 3.8 G/DL (ref 3.5–5.2)
ALP SERPL-CCNC: 66 U/L (ref 40–129)
ALT SERPL-CCNC: 14 U/L (ref 0–40)
ANION GAP SERPL CALCULATED.3IONS-SCNC: 12 MMOL/L (ref 7–16)
AST SERPL-CCNC: 13 U/L (ref 0–39)
BASOPHILS # BLD: 0.05 K/UL (ref 0–0.2)
BASOPHILS NFR BLD: 1 % (ref 0–2)
BILIRUB DIRECT SERPL-MCNC: <0.2 MG/DL (ref 0–0.3)
BILIRUB INDIRECT SERPL-MCNC: ABNORMAL MG/DL (ref 0–1)
BILIRUB SERPL-MCNC: 1 MG/DL (ref 0–1.2)
BUN SERPL-MCNC: 12 MG/DL (ref 6–20)
CALCIUM SERPL-MCNC: 8.8 MG/DL (ref 8.6–10.2)
CHLORIDE SERPL-SCNC: 106 MMOL/L (ref 98–107)
CO2 SERPL-SCNC: 22 MMOL/L (ref 22–29)
CREAT SERPL-MCNC: 1 MG/DL (ref 0.7–1.2)
EOSINOPHIL # BLD: 0.2 K/UL (ref 0.05–0.5)
EOSINOPHILS RELATIVE PERCENT: 3 % (ref 0–6)
ERYTHROCYTE [DISTWIDTH] IN BLOOD BY AUTOMATED COUNT: 13.6 % (ref 11.5–15)
GFR SERPL CREATININE-BSD FRML MDRD: >60 ML/MIN/1.73M2
GLUCOSE SERPL-MCNC: 86 MG/DL (ref 74–99)
HCT VFR BLD AUTO: 42 % (ref 37–54)
HGB BLD-MCNC: 13.8 G/DL (ref 12.5–16.5)
IMM GRANULOCYTES # BLD AUTO: <0.03 K/UL (ref 0–0.58)
IMM GRANULOCYTES NFR BLD: 0 % (ref 0–5)
LYMPHOCYTES NFR BLD: 2.06 K/UL (ref 1.5–4)
LYMPHOCYTES RELATIVE PERCENT: 25 % (ref 20–42)
MCH RBC QN AUTO: 28.6 PG (ref 26–35)
MCHC RBC AUTO-ENTMCNC: 32.9 G/DL (ref 32–34.5)
MCV RBC AUTO: 87 FL (ref 80–99.9)
MONOCYTES NFR BLD: 0.74 K/UL (ref 0.1–0.95)
MONOCYTES NFR BLD: 9 % (ref 2–12)
NEUTROPHILS NFR BLD: 62 % (ref 43–80)
NEUTS SEG NFR BLD: 5.08 K/UL (ref 1.8–7.3)
PLATELET # BLD AUTO: 267 K/UL (ref 130–450)
PMV BLD AUTO: 10.5 FL (ref 7–12)
POTASSIUM SERPL-SCNC: 4.3 MMOL/L (ref 3.5–5)
PROT SERPL-MCNC: 6.2 G/DL (ref 6.4–8.3)
RBC # BLD AUTO: 4.83 M/UL (ref 3.8–5.8)
SODIUM SERPL-SCNC: 140 MMOL/L (ref 132–146)
WBC OTHER # BLD: 8.2 K/UL (ref 4.5–11.5)

## 2023-10-17 PROCEDURE — 80053 COMPREHEN METABOLIC PANEL: CPT

## 2023-10-17 PROCEDURE — 97530 THERAPEUTIC ACTIVITIES: CPT

## 2023-10-17 PROCEDURE — 82248 BILIRUBIN DIRECT: CPT

## 2023-10-17 PROCEDURE — 99222 1ST HOSP IP/OBS MODERATE 55: CPT | Performed by: PSYCHIATRY & NEUROLOGY

## 2023-10-17 PROCEDURE — 2580000003 HC RX 258: Performed by: STUDENT IN AN ORGANIZED HEALTH CARE EDUCATION/TRAINING PROGRAM

## 2023-10-17 PROCEDURE — 85025 COMPLETE CBC W/AUTO DIFF WBC: CPT

## 2023-10-17 PROCEDURE — 97165 OT EVAL LOW COMPLEX 30 MIN: CPT

## 2023-10-17 PROCEDURE — 6370000000 HC RX 637 (ALT 250 FOR IP): Performed by: STUDENT IN AN ORGANIZED HEALTH CARE EDUCATION/TRAINING PROGRAM

## 2023-10-17 PROCEDURE — 82306 VITAMIN D 25 HYDROXY: CPT

## 2023-10-17 PROCEDURE — 36415 COLL VENOUS BLD VENIPUNCTURE: CPT

## 2023-10-17 PROCEDURE — 97161 PT EVAL LOW COMPLEX 20 MIN: CPT

## 2023-10-17 RX ADMIN — Medication 1000 UNITS: at 10:09

## 2023-10-17 RX ADMIN — Medication 500 MG: at 10:09

## 2023-10-17 RX ADMIN — FINASTERIDE 5 MG: 5 TABLET, FILM COATED ORAL at 10:09

## 2023-10-17 RX ADMIN — Medication 10 ML: at 10:09

## 2023-10-17 NOTE — DISCHARGE SUMMARY
D3 25 MCG (1000 UT) Tabs tablet  Commonly known as: CHOLECALCIFEROL  Take 1 tablet by mouth daily     zinc sulfate 220 (50 Zn) MG capsule  Commonly known as: ZINCATE  Take 1 capsule by mouth daily              Time Spent on discharge is more than 45 minutes in the examination, evaluation, counseling and review of medications and discharge plan.    +++++++++++++++++++++++++++++++++++++++++++++++++  Robin Coronado MD  [place], [place]  +++++++++++++++++++++++++++++++++++++++++++++++++  NOTE: This report was transcribed using voice recognition software. Every effort was made to ensure accuracy; however, inadvertent computerized transcription errors may be present.

## 2023-10-17 NOTE — PROGRESS NOTES
4 Eyes Skin Assessment     NAME:  Aniceto Boyer  YOB: 1965  MEDICAL RECORD NUMBER:  [id number]    The patient is being assessed for  Admission    I agree that at least one RN has performed a thorough Head to Toe Skin Assessment on the patient. ALL assessment sites listed below have been assessed. Areas assessed by both nurses:    Head, Face, Ears, Shoulders, Back, Chest, Arms, Elbows, Hands, Sacrum. Buttock, Coccyx, Ischium, and Legs. Feet and Heels        Does the Patient have a Wound?  No noted wound(s)       Lucas Prevention initiated by RN: No  Wound Care Orders initiated by RN: No    Pressure Injury (Stage 3,4, Unstageable, DTI, NWPT, and Complex wounds) if present, place Wound referral order by RN under : No    New Ostomies, if present place, Ostomy referral order under : No     Nurse 1 eSignature: Electronically signed by César Ge RN on 10/16/23 at 6:33 PM EDT    **SHARE this note so that the co-signing nurse can place an eSignature**    Nurse 2 eSignature: Electronically signed by Gilberto Byrne RN on 10/16/23 at 6:34 PM EDT
Neurology consult sent to Dr. Johnathan Skiff and Neurosurgery consult sent to Dr. Adilson Pantoja.
Notified Dr. Selma Rocha pt arrive to unit and for admission orders.
Occupational Therapy  OCCUPATIONAL THERAPY INITIAL EVALUATION    MARY ANN [facility] [address] [facility]   [address] [place], [place]      ECGA:                                                Patient Name: Meme Becerra  MRN: [id number]  : 1965  Room: [address] [place] [address] #[facility]    Referring Provider: Jenny Saenz MD  Specific Provider Orders/Date: OT eval and treat 10/16/23     Diagnosis: Mass of frontal lobe [G93.89]  Brain lesion [G93.9]   Pt admitted to hospital on 10/15/23 for dizziness    Pertinent Medical History:  has no past medical history on file.        Precautions:  Fall Risk    Assessment of current deficits    [x] Functional mobility  [x]ADLs  [x] Strength               []Cognition    [x] Functional transfers   [x] IADLs         [x] Safety Awareness   [x]Endurance    [] Fine Coordination              [x] Balance      [] Vision/perception   []Sensation     []Gross Motor Coordination  [] ROM  [] Delirium                   [] Motor Control     OT PLAN OF CARE   OT POC based on physician orders, patient diagnosis and results of clinical assessment    Frequency/Duration 1-3 days/wk for 2 weeks PRN   Specific OT Treatment Interventions to include:   * Instruction/training on adapted ADL techniques and AE recommendations to increase functional independence within precautions       * Training on energy conservation strategies, correct breathing pattern and techniques to improve independence/tolerance for self-care routine  * Functional transfer/mobility training/DME recommendations for increased independence, safety, and fall prevention  * Patient/Family education to increase follow through with safety techniques and functional independence  * Recommendation of environmental modifications for increased safety with functional transfers/mobility and ADLs  * Therapeutic exercise to improve motor endurance, ROM, and functional
Pharmacy Note    This patient was ordered ipratropium (Atrovent) 0.06% nasal spray. [facility] this medication is non-formulary and not stocked by pharmacy for the reason indicated below. The medication can be reordered at discharge.      Medications in which risks outweigh benefits during hospitalization:           -  oral bisphosphonates         -  raloxifene (Evista)        -  SGLT2 inhibitors (ordered in the hospital for an indication other than heart failure or chronic kidney disease)    Medications that lack necessity during an acute hospital stay:        -  nasal antihistamines        -  nasal ipratropium 0.03% and 0.06%        -  nasal miacalcin        -  acyclovir topical cream/ointment orders for herpes labialis (cold sores)
See neurology consult. MRI suggesting cavernoma. .. Up to date states. Asymptomatic cavernous malformations are generally followed without intervention. Surgical resection may be indicated for accessible symptomatic lesions associated with progressive neurologic deficits, intractable epilepsy, and/or hemorrhage. Stereotactic radiosurgery is an option for nonoperable lesions, but long-term safety is uncertain. This MRI finding is asymptomatic and incidental.  Suggest follow up imaging studies in 6 to 12 months.
improve independence with functional mobility   [x] ROM to improve independence with functional mobility   [x] Balance Training to improve static/dynamic balance and to reduce fall risk  [x] Endurance Training to improve activity tolerance during functional mobility   [x] Transfer Training to improve safety and independence with all functional transfers   [x] Gait Training to improve gait mechanics, endurance and assess need for appropriate assistive device  [x] Stair Training in preparation for safe discharge home and/or into the community   [x] Positioning to prevent skin breakdown and contractures  [x] Safety and Education Training   [x] Patient/Caregiver Education   [] HEP  [] Other     PT long term treatment goals are located in above grid    Frequency of treatments: 2-5x/week x 1-2 weeks. Time in  1353  Time out  1420    Total Treatment Time  12 minutes     Evaluation Time includes thorough review of current medical information, gathering information on past medical history/social history and prior level of function, completion of standardized testing/informal observation of tasks, assessment of data and education on plan of care and goals.     CPT codes:  [x] Low Complexity PT evaluation 81040  [] Moderate Complexity PT evaluation 49365  [] High Complexity PT evaluation 82444  [] PT Re-evaluation 45691  [] Gait training 96475 0 minutes  [] Manual therapy 87297 0 minutes  [x] Therapeutic activities 22268 12 minutes  [] Therapeutic exercises 53867 0 minutes  [] Neuromuscular reeducation 85124 0 minutes     Estefani Melgar, PT, DPT  [id number]

## 2023-10-17 NOTE — CARE COORDINATION
Met with pt and wife in the room. Discharge order is in epic. OT saw pt with ampac of 20. PT to eval. Pt doesn't anticipate any rehab needs. Pt lives with wife. Pt works full time and was independent pta. No dme or hhc pta. The couple has a ranch home and enters thru the garage with 3 steps. Pt is not a . His pcp is Dr. Josue Lim and say him  about 3 weeks ago. KARY Vuong  .10/17/2023    Case Management Assessment  Initial Evaluation    Date/Time of Evaluation: 10/17/2023 1:50 PM  Assessment Completed by: KARY Vuong    If patient is discharged prior to next notation, then this note serves as note for discharge by case management. Patient Name: Alycia Rodas                   YOB: 1965  Diagnosis: Mass of frontal lobe [G93.89]  Brain lesion [G93.9]                   Date / Time: 10/16/2023 11:01 AM    Patient Admission Status: Inpatient   Readmission Risk (Low < 19, Mod (19-27), High > 27): Readmission Risk Score: 5    Current PCP: Radha Jackman, DO  PCP verified by CM? Yes    Chart Reviewed: Yes      History Provided by: Patient  Patient Orientation: Alert and Oriented    Patient Cognition: Alert    Hospitalization in the last 30 days (Readmission):  No    If yes, Readmission Assessment in CM Navigator will be completed. Advance Directives:      Code Status: Full Code   Patient's Primary Decision Maker is: Named in Scanned ACP Document    Primary Decision Maker: JangDottie badillo - Spouse - [phone]    Discharge Planning:    Patient lives with: Spouse/Significant Other Type of Home: House  Primary Care Giver: Self  Patient Support Systems include: Spouse/Significant Other   Current Financial resources:    Current community resources:    Current services prior to admission: None            Current DME:              Type of Home Care services:  None    ADLS  Prior functional level: Independent in ADLs/IADLs  Current functional level:  Independent in ADLs/IADLs    PT AM-PAC:

## 2023-10-17 NOTE — ACP (ADVANCE CARE PLANNING)
Advance Care Planning   Healthcare Decision Maker:    Primary Decision Maker: Branden Siddiqui - [phone]    Click here to complete Healthcare Decision Makers including selection of the Healthcare Decision Maker Relationship (ie \"Primary\").

## 2023-10-17 NOTE — CONSULTS
[address] [place] [address]  Neuro Inpatient Consult        Destiny Galarza is a 60 y.o. right handed male    Neurology was consulted for evaluation of left frontal mass on CT     Past Medical History:     Patient denies having any significant past medical diagnoses but admits to taking finasteride for urinary retention    Past Surgical History:     Past Surgical History:   Procedure Laterality Date    CYST REMOVAL Left     as kid ganglion    HERNIA REPAIR Left     Inguinal    TONSILLECTOMY         Allergies:     Patient has no known allergies. Medications:     Prior to Admission medications    Medication Sig Start Date End Date Taking? Authorizing Provider   finasteride (PROSCAR) 5 MG tablet Take 1 tablet by mouth daily 9/29/23   Jero GAVIN DO   ipratropium (ATROVENT) 0.06 % nasal spray 2 sprays by Each Nostril route 4 times daily  Patient taking differently: 2 sprays by Each Nostril route 4 times daily as needed for Rhinitis 3/20/23   Jero GAVIN DO   azelastine (ASTELIN) 0.1 % nasal spray 2 sprays by Nasal route 2 times daily Use in each nostril as directed  Patient taking differently: 2 sprays by Nasal route 2 times daily as needed for Rhinitis Use in each nostril as directed 11/22/21   Alfredo Zee DO   zinc sulfate (ZINCATE) 220 (50 Zn) MG capsule Take 1 capsule by mouth daily 10/13/21 11/12/21  Alfredo Zee DO   vitamin D3 (CHOLECALCIFEROL) 25 MCG (1000 UT) TABS tablet Take 1 tablet by mouth daily 10/13/21   Alfredo Zee DO   ascorbic acid (VITAMIN C) 500 MG tablet Take 1 tablet by mouth daily 10/7/21   Joselyn Melgar, DO       Social History:     Patient denies the use of tobacco, or illicit drugs, and admits to having about one alcoholic beverage per year.     Review of Systems:     No chest pain or palpitations  No SOB  Admits to vertigo with sensations of uneven ground, but denies lightheadedness or loss of consciousness  No falls, tripping or stumbling  No
see any big hemorrhage around it. I do not see other cavernomas. For this cavernoma I do not recommend surgery at this stage however I did  the patient regarding the finding and prognosis of cavernoma and risk of rebleeding and recommend that he gets another MRI of the brain in about a year or earlier if he develops any new symptoms.     Electronically signed by Celestino Caldera MD on 10/16/23 at 2:10 PM EDT
no...

## 2023-10-18 ENCOUNTER — TELEPHONE (OUTPATIENT)
Dept: FAMILY MEDICINE CLINIC | Age: 58
End: 2023-10-18

## 2023-10-18 NOTE — TELEPHONE ENCOUNTER
Care Transitions Initial Follow Up Call    Outreach made within 2 business days of discharge: Yes    Patient: Chris Sims Patient : 1965   MRN: [id number]  Reason for Admission: There are no discharge diagnoses documented for the most recent discharge. Discharge Date: 10/17/23       Spoke with: Harvey Cardozo, wife    Discharge department/facility: [facility] Interactive Patient Contact:  Was patient able to fill all prescriptions: No: N/A  Was patient instructed to bring all medications to the follow-up visit: Yes  Is patient taking all medications as directed in the discharge summary? Yes  Does patient understand their discharge instructions: Yes  Does patient have questions or concerns that need addressed prior to 7-14 day follow up office visit: no    Scheduled appointment with PCP within 7-14 days--appt scheduled 10/26/23.     Follow Up  Future Appointments   Date Time Provider [address]   10/26/2023  2:30 PM DO Onel Grier  [address] [facility]

## 2023-10-24 ENCOUNTER — TELEPHONE (OUTPATIENT)
Dept: FAMILY MEDICINE CLINIC | Age: 58
End: 2023-10-24

## 2023-10-24 NOTE — TELEPHONE ENCOUNTER
Action PT called she stated pt was seen on 10.23.23.but the order was from [facility] and there is no way to get a hold of the doctors who signed the order. It states Dr Sole Villalobos will follow the pt. So they are asking that Dr Sole Villalobos puts referral in dated 10.23.23.  for Vertigo due to frontal lobe mass. And they will send Dr Sole Villalobos the reports. Please advise.   Last seen 9/25/2023  Next appt 10/26/2023  Fax [phone]

## 2023-10-25 DIAGNOSIS — R42 VERTIGO: Primary | ICD-10-CM

## 2023-10-26 ENCOUNTER — OFFICE VISIT (OUTPATIENT)
Dept: FAMILY MEDICINE CLINIC | Age: 58
End: 2023-10-26

## 2023-10-26 VITALS
RESPIRATION RATE: 18 BRPM | BODY MASS INDEX: 28.08 KG/M2 | SYSTOLIC BLOOD PRESSURE: 118 MMHG | HEART RATE: 55 BPM | OXYGEN SATURATION: 98 % | DIASTOLIC BLOOD PRESSURE: 80 MMHG | HEIGHT: 73 IN | WEIGHT: 211.86 LBS

## 2023-10-26 DIAGNOSIS — Z09 HOSPITAL DISCHARGE FOLLOW-UP: ICD-10-CM

## 2023-10-26 DIAGNOSIS — R42 VERTIGO: Primary | ICD-10-CM

## 2023-10-26 DIAGNOSIS — G44.52 NEW DAILY PERSISTENT HEADACHE: ICD-10-CM

## 2023-10-26 DIAGNOSIS — D18.00 CAVERNOMA: ICD-10-CM

## 2023-10-26 RX ORDER — MECLIZINE HYDROCHLORIDE 25 MG/1
25 TABLET ORAL 3 TIMES DAILY PRN
Qty: 30 TABLET | Refills: 0 | Status: SHIPPED | OUTPATIENT
Start: 2023-10-26 | End: 2023-11-05

## 2023-10-27 NOTE — PROGRESS NOTES
Post-Discharge Transitional Care Management Progress Note      Bibi Wilkes   YOB: 1965    Date of Office Visit:  10/26/2023  Date of Hospital Admission: 10/16/2023  Date of Hospital Discharge: 10/17/2023    Care management risk score Rising risk (score 2-5) and Complex Care (Scores >=6): No Risk Score On File     Non face to face  following discharge, date last encounter closed (first attempt may have been earlier): 10/18/2023 10/18/2023    Call initiated 2 business days of discharge: Yes    ASSESSMENT/PLAN:   Vertigo  -     meclizine (ANTIVERT) 25 MG tablet; Take 1 tablet by mouth 3 times daily as needed for Dizziness, Disp-30 tablet, R-0Normal  Hospital discharge follow-up  -     DE DISCHARGE MEDS RECONCILED W/ CURRENT OUTPATIENT MED LIST      Medical Decision Making: moderate complexity  Return in 2 weeks (on 11/9/2023). Subjective:   HPI:  Follow up of Hospital problems/diagnosis(es): vertigo, cavernomoa    Inpatient course: Discharge summary reviewed- see chart. Interval history/Current status:improved    Patient Active Problem List   Diagnosis    Acromioclavicular joint arthritis    Rotator cuff tendonitis    Bursitis of right shoulder    Impingement syndrome of right shoulder    Plantar fasciitis    Difficulty walking    Acute respiratory failure with hypoxia (HCC)    Diarrhea due to COVID-19    COVID-19    Vertigo    Brain mass    Mass of frontal lobe    Brain lesion    Benign paroxysmal positional vertigo of right ear       Medications listed as ordered at the time of discharge from hospital     Medication List            Accurate as of October 26, 2023 11:59 PM. If you have any questions, ask your nurse or doctor.                 START taking these medications      meclizine 25 MG tablet  Commonly known as: ANTIVERT  Take 1 tablet by mouth 3 times daily as needed for Dizziness  Started by: Sandy Zamora, DO            CHANGE how you take these medications      ipratropium 0.06

## 2024-03-25 LAB — PROSTATE SPECIFIC ANTIGEN: 2.63 NG/ML (ref 0–4)

## 2024-08-08 DIAGNOSIS — N40.1 BENIGN PROSTATIC HYPERPLASIA WITH URINARY FREQUENCY: ICD-10-CM

## 2024-08-08 DIAGNOSIS — R35.0 BENIGN PROSTATIC HYPERPLASIA WITH URINARY FREQUENCY: ICD-10-CM

## 2024-08-09 RX ORDER — FINASTERIDE 5 MG/1
5 TABLET, FILM COATED ORAL DAILY
Qty: 90 TABLET | Refills: 5 | Status: SHIPPED | OUTPATIENT
Start: 2024-08-09

## 2024-08-18 DIAGNOSIS — R35.0 BENIGN PROSTATIC HYPERPLASIA WITH URINARY FREQUENCY: ICD-10-CM

## 2024-08-18 DIAGNOSIS — N40.1 BENIGN PROSTATIC HYPERPLASIA WITH URINARY FREQUENCY: ICD-10-CM

## 2024-08-20 RX ORDER — FINASTERIDE 5 MG/1
5 TABLET, FILM COATED ORAL DAILY
Qty: 90 TABLET | Refills: 5 | Status: SHIPPED | OUTPATIENT
Start: 2024-08-20

## 2024-09-17 LAB — PSA SERPL-MCNC: 3.34 NG/ML (ref 0–4)

## 2024-12-20 DIAGNOSIS — N40.1 BENIGN PROSTATIC HYPERPLASIA WITH URINARY FREQUENCY: ICD-10-CM

## 2024-12-20 DIAGNOSIS — R35.0 BENIGN PROSTATIC HYPERPLASIA WITH URINARY FREQUENCY: ICD-10-CM

## 2024-12-20 RX ORDER — FINASTERIDE 5 MG/1
5 TABLET, FILM COATED ORAL DAILY
Qty: 90 TABLET | Refills: 5 | OUTPATIENT
Start: 2024-12-20

## 2025-01-21 ENCOUNTER — OFFICE VISIT (OUTPATIENT)
Dept: FAMILY MEDICINE CLINIC | Age: 60
End: 2025-01-21

## 2025-01-21 VITALS
SYSTOLIC BLOOD PRESSURE: 120 MMHG | BODY MASS INDEX: 29.55 KG/M2 | WEIGHT: 223 LBS | OXYGEN SATURATION: 96 % | RESPIRATION RATE: 18 BRPM | HEIGHT: 73 IN | DIASTOLIC BLOOD PRESSURE: 78 MMHG | HEART RATE: 61 BPM

## 2025-01-21 DIAGNOSIS — E67.8 HYPERVITAMINOSIS, B COMPLEX: ICD-10-CM

## 2025-01-21 DIAGNOSIS — R53.83 FATIGUE, UNSPECIFIED TYPE: ICD-10-CM

## 2025-01-21 DIAGNOSIS — R97.20 ELEVATED PSA: ICD-10-CM

## 2025-01-21 DIAGNOSIS — G62.9 PERIPHERAL POLYNEUROPATHY: ICD-10-CM

## 2025-01-21 DIAGNOSIS — M65.342 TRIGGER FINGER, LEFT RING FINGER: ICD-10-CM

## 2025-01-21 DIAGNOSIS — E78.2 MIXED HYPERLIPIDEMIA: ICD-10-CM

## 2025-01-21 DIAGNOSIS — R73.01 IFG (IMPAIRED FASTING GLUCOSE): ICD-10-CM

## 2025-01-21 DIAGNOSIS — R53.82 CHRONIC FATIGUE: ICD-10-CM

## 2025-01-21 DIAGNOSIS — Z20.828 EXPOSURE TO THE FLU: ICD-10-CM

## 2025-01-21 DIAGNOSIS — D18.00 CAVERNOMA: ICD-10-CM

## 2025-01-21 DIAGNOSIS — G62.9 PERIPHERAL POLYNEUROPATHY: Primary | ICD-10-CM

## 2025-01-21 PROBLEM — J96.01 ACUTE RESPIRATORY FAILURE WITH HYPOXIA: Status: RESOLVED | Noted: 2021-10-03 | Resolved: 2025-01-21

## 2025-01-21 LAB
ALBUMIN: 4.4 G/DL (ref 3.5–5.2)
ALP BLD-CCNC: 71 U/L (ref 40–129)
ALT SERPL-CCNC: 30 U/L (ref 0–40)
ANION GAP SERPL CALCULATED.3IONS-SCNC: 12 MMOL/L (ref 7–16)
AST SERPL-CCNC: 26 U/L (ref 0–39)
BASOPHILS ABSOLUTE: 0.06 K/UL (ref 0–0.2)
BASOPHILS RELATIVE PERCENT: 1 % (ref 0–2)
BILIRUB SERPL-MCNC: 1.4 MG/DL (ref 0–1.2)
BUN BLDV-MCNC: 17 MG/DL (ref 6–23)
CALCIUM SERPL-MCNC: 9.9 MG/DL (ref 8.6–10.2)
CHLORIDE BLD-SCNC: 104 MMOL/L (ref 98–107)
CHOLESTEROL, TOTAL: 192 MG/DL
CO2: 25 MMOL/L (ref 22–29)
CREAT SERPL-MCNC: 1 MG/DL (ref 0.7–1.2)
EOSINOPHILS ABSOLUTE: 0.23 K/UL (ref 0.05–0.5)
EOSINOPHILS RELATIVE PERCENT: 4 % (ref 0–6)
FOLATE: 16.8 NG/ML (ref 4.8–24.2)
GFR, ESTIMATED: 86 ML/MIN/1.73M2
GLUCOSE BLD-MCNC: 94 MG/DL (ref 74–99)
HCT VFR BLD CALC: 46.9 % (ref 37–54)
HDLC SERPL-MCNC: 42 MG/DL
HEMOGLOBIN: 15.4 G/DL (ref 12.5–16.5)
IMMATURE GRANULOCYTES %: 0 % (ref 0–5)
IMMATURE GRANULOCYTES ABSOLUTE: <0.03 K/UL (ref 0–0.58)
LDL CHOLESTEROL: 132 MG/DL
LYMPHOCYTES ABSOLUTE: 1.99 K/UL (ref 1.5–4)
LYMPHOCYTES RELATIVE PERCENT: 30 % (ref 20–42)
MCH RBC QN AUTO: 28.5 PG (ref 26–35)
MCHC RBC AUTO-ENTMCNC: 32.8 G/DL (ref 32–34.5)
MCV RBC AUTO: 86.9 FL (ref 80–99.9)
MONOCYTES ABSOLUTE: 0.69 K/UL (ref 0.1–0.95)
MONOCYTES RELATIVE PERCENT: 10 % (ref 2–12)
NEUTROPHILS ABSOLUTE: 3.67 K/UL (ref 1.8–7.3)
NEUTROPHILS RELATIVE PERCENT: 55 % (ref 43–80)
PDW BLD-RTO: 12.9 % (ref 11.5–15)
PLATELET # BLD: 283 K/UL (ref 130–450)
PMV BLD AUTO: 10.6 FL (ref 7–12)
POTASSIUM SERPL-SCNC: 4.5 MMOL/L (ref 3.5–5)
PROSTATE SPECIFIC ANTIGEN: 2.77 NG/ML (ref 0–4)
RBC # BLD: 5.4 M/UL (ref 3.8–5.8)
SODIUM BLD-SCNC: 141 MMOL/L (ref 132–146)
T3 FREE: 3.58 PG/ML (ref 2–4.4)
T4 FREE: 1.1 NG/DL (ref 0.9–1.7)
TOTAL PROTEIN: 7.1 G/DL (ref 6.4–8.3)
TRIGL SERPL-MCNC: 90 MG/DL
TSH SERPL DL<=0.05 MIU/L-ACNC: 2.61 UIU/ML (ref 0.27–4.2)
VITAMIN B-12: 721 PG/ML (ref 211–946)
VLDLC SERPL CALC-MCNC: 18 MG/DL
WBC # BLD: 6.7 K/UL (ref 4.5–11.5)

## 2025-01-21 RX ORDER — OSELTAMIVIR PHOSPHATE 75 MG/1
75 CAPSULE ORAL 2 TIMES DAILY
Qty: 10 CAPSULE | Refills: 0 | Status: SHIPPED | OUTPATIENT
Start: 2025-01-21 | End: 2025-01-26

## 2025-01-21 SDOH — ECONOMIC STABILITY: FOOD INSECURITY: WITHIN THE PAST 12 MONTHS, THE FOOD YOU BOUGHT JUST DIDN'T LAST AND YOU DIDN'T HAVE MONEY TO GET MORE.: PATIENT DECLINED

## 2025-01-21 SDOH — ECONOMIC STABILITY: FOOD INSECURITY: WITHIN THE PAST 12 MONTHS, YOU WORRIED THAT YOUR FOOD WOULD RUN OUT BEFORE YOU GOT MONEY TO BUY MORE.: PATIENT DECLINED

## 2025-01-21 ASSESSMENT — PATIENT HEALTH QUESTIONNAIRE - PHQ9
SUM OF ALL RESPONSES TO PHQ9 QUESTIONS 1 & 2: 0
SUM OF ALL RESPONSES TO PHQ QUESTIONS 1-9: 0
1. LITTLE INTEREST OR PLEASURE IN DOING THINGS: NOT AT ALL
2. FEELING DOWN, DEPRESSED OR HOPELESS: NOT AT ALL
SUM OF ALL RESPONSES TO PHQ QUESTIONS 1-9: 0

## 2025-01-21 NOTE — PROGRESS NOTES
Osei Jang is a 59 y.o. male  .  Subjective:      Due for blood work. Has numbness to feet since Covid. Sock distribution. Some burning. Mostly at night. Will perform EMG, blood work and set up with neurology. Trigger finger worsening.        Review of Systems   Constitutional:  Negative for activity change, appetite change, chills, diaphoresis, fatigue, fever and unexpected weight change.   HENT:  Negative for congestion, dental problem, drooling, ear discharge, ear pain, facial swelling, hearing loss, mouth sores, nosebleeds, postnasal drip, rhinorrhea, sinus pressure, sneezing, sore throat, tinnitus, trouble swallowing and voice change.    Eyes:  Negative for photophobia, pain, discharge, redness, itching and visual disturbance.   Respiratory:  Negative for apnea, cough, choking, chest tightness, shortness of breath, wheezing and stridor.    Cardiovascular:  Negative for chest pain, palpitations and leg swelling.   Gastrointestinal:  Negative for abdominal distention, abdominal pain, anal bleeding, blood in stool, constipation, diarrhea, nausea, rectal pain and vomiting.   Endocrine: Negative for cold intolerance, heat intolerance, polydipsia, polyphagia and polyuria.   Genitourinary:  Negative for decreased urine volume, difficulty urinating, dysuria, enuresis, flank pain, frequency, genital sores, hematuria, penile discharge, penile pain, penile swelling, scrotal swelling, testicular pain and urgency.   Musculoskeletal:  Negative for arthralgias, back pain, gait problem, joint swelling, myalgias, neck pain and neck stiffness.   Skin:  Negative for color change, pallor, rash and wound.   Allergic/Immunologic: Negative for environmental allergies, food allergies and immunocompromised state.   Neurological:  Positive for numbness. Negative for dizziness, tremors, seizures, syncope, facial asymmetry, speech difficulty, weakness, light-headedness and headaches.   Hematological:  Negative for adenopathy. Does not

## 2025-01-22 LAB
ALBUMIN (CALCULATED): 3.6 G/DL (ref 3.5–4.7)
ALPHA-1-GLOBULIN: 0.2 G/DL (ref 0.2–0.4)
ALPHA-2-GLOBULIN: 0.7 G/DL (ref 0.5–1)
BETA GLOBULIN: 1.1 G/DL (ref 0.8–1.3)
GAMMA GLOBULIN: 1 G/DL (ref 0.7–1.6)
HEPATITIS C ANTIBODY: NONREACTIVE
PATHOLOGIST: NORMAL
PROTEIN ELECTROPHORESIS, SERUM: NORMAL
TOTAL PROTEIN: 6.7 G/DL (ref 6.4–8.3)

## 2025-01-23 ASSESSMENT — ENCOUNTER SYMPTOMS
PHOTOPHOBIA: 0
ABDOMINAL PAIN: 0
VOICE CHANGE: 0
CONSTIPATION: 0
WHEEZING: 0
EYE REDNESS: 0
APNEA: 0
COUGH: 0
VOMITING: 0
EYE ITCHING: 0
CHEST TIGHTNESS: 0
ABDOMINAL DISTENTION: 0
TROUBLE SWALLOWING: 0
FACIAL SWELLING: 0
EYE PAIN: 0
RECTAL PAIN: 0
STRIDOR: 0
EYE DISCHARGE: 0
COLOR CHANGE: 0
BACK PAIN: 0
DIARRHEA: 0
BLOOD IN STOOL: 0
CHOKING: 0
NAUSEA: 0
ANAL BLEEDING: 0
RHINORRHEA: 0
SORE THROAT: 0
SINUS PRESSURE: 0
SHORTNESS OF BREATH: 0

## 2025-01-24 LAB — VITAMIN B6: 125.9 NMOL/L (ref 20–125)

## 2025-01-26 LAB — VITAMIN B1 WHOLE BLOOD: 167 NMOL/L (ref 70–180)

## 2025-01-29 ENCOUNTER — PROCEDURE VISIT (OUTPATIENT)
Dept: PHYSICAL MEDICINE AND REHAB | Age: 60
End: 2025-01-29
Payer: COMMERCIAL

## 2025-01-29 VITALS — WEIGHT: 220 LBS | HEIGHT: 73 IN | BODY MASS INDEX: 29.16 KG/M2

## 2025-01-29 DIAGNOSIS — G62.9 PERIPHERAL POLYNEUROPATHY: ICD-10-CM

## 2025-01-29 PROCEDURE — 99203 OFFICE O/P NEW LOW 30 MIN: CPT | Performed by: PHYSICAL MEDICINE & REHABILITATION

## 2025-01-29 PROCEDURE — 95911 NRV CNDJ TEST 9-10 STUDIES: CPT | Performed by: PHYSICAL MEDICINE & REHABILITATION

## 2025-01-29 PROCEDURE — 95886 MUSC TEST DONE W/N TEST COMP: CPT | Performed by: PHYSICAL MEDICINE & REHABILITATION

## 2025-01-29 NOTE — PROGRESS NOTES
[facility]  Electrodiagnostic Laboratory  *Acc[facility] the [facility] with exemplary status  [address] [place] [address]  Phone: [phone]  Fax: [phone]    Referring Provider: Osei Maldonado DO  Primary Care Physician: Osei Maldonado DO  Patient Name: Osei Jang  Patient YOB: 1965  Gender: male  BMI: Body mass index is 29.03 kg/m².  Height 1.854 m (6' 1\"), weight 99.8 kg (220 lb).    2/5/2025    Reason for Referral: Peripheral polyneuropathy     Description of clinical problem:   Chief Complaint   Patient presents with    Extremity Pain     None    Numbness     Numbness in the toes and at times in the bottom of the feet. Symp a less while shoes are on. Symp started after covid in 2021.     Extremity Weakness     None     Sensory NCS      Nerve / Sites Rec. Site Peak Lat PP Amp Segments Distance Velocity Temp.     ms µV  cm m/s °C   R Sural - Ankle (Calf)      Calf Ankle 4.01 8.2 Calf - Ankle 14 45 31   R Superficial peroneal - Ankle      Lat leg Ankle 3.02 6.7 Lat leg - Ankle 10 47 31   R Medial plantar, Lateral plantar - Ankle (Medial, lateral sole)      Medial plantar Sole Ankle NR* NR Medial plantar Sole - Ankle 14 NR 31      Lateral plantar Sole Ankle NR* NR Lateral plantar Sole - Ankle 14 NR 31       Medial plantar Sole - Lateral plantar Sole   31   L Medial plantar, Lateral plantar - Ankle (Medial, lateral sole)      Medial plantar Sole Ankle 4.95* 4.1 Medial plantar Sole - Ankle 14 32 31      Lateral plantar Sole Ankle NR* NR Lateral plantar Sole - Ankle 14 NR 31       Medial plantar Sole - Lateral plantar Sole   31       Motor NCS      Nerve / Sites Muscle Onset Amplitude Segments Distance Velocity Temp.     ms mV  cm m/s °C   R Peroneal - EDB      Ankle EDB 5.05 5.3 Ankle - EDB 8  31      Fib head EDB 12.97 5.2 Fib head - Ankle 31 39 31      Above Knee EDB 15.42 5.1 Above Knee - Fib head 12 49 31   R Tibial - AH      Ankle AH 4.11 8.0 Ankle - AH

## 2025-01-29 NOTE — PATIENT INSTRUCTIONS
Electrodiagnotic Laboratory  [facility] the [facility]  SUMI Quinones D.O.   [facility]  [address] [place] [address]  Phone: [phone]  Fax: [phone]        Today you had an electrodiagnostic exam which included nerve conduction studies (NCS) and electromyography (EMG). This test evaluated the electrical activity of your nerves and muscles to help determine if you have a nerve or muscle disease.  This test can help determine the location and type of a nerve or muscle problem. This will help your referring doctor diagnose your condition and determine the appropriate next step in your treatment plan.     After your test:    1. There are no long lasting side effects of the test.     2. You may resume your normal activities without restrictions.     3.  Resume any medications that were stopped for the test.     4  If you have sore areas or bruising in your muscles where the needle was placed, apply a cold pack to the sore area for 15-20 minutes three to four times a day as needed for pain.  The soreness should go away in about 1-2 days.     5. Your results were provided  Briefly at the end of your test and the final detailed report will be provided to your referring physician, and/or primary care physician and any other parties you requested within 1-2 days of the examination. You may wish to contact your referring provider after a few days to determine what they would like you to do next.     6.  Please call [phone] with any questions or concerns and if you develop increased body temperature/fever, swelling, tenderness, increased pain and/or drainage from the sites where the needle was placed.     Thank you for choosing us for your health care needs.

## 2025-01-29 NOTE — PROGRESS NOTES
Electrodiagnostic Laboratory  *Accredited by the [facility] with exemplary status  [address] [place] [address]  Phone: [phone]  Fax: [phone]      Date of Examination: 01/29/25    Patient Name: Osei Jang    An independent historian was not needed.     Osei Jang  is a 59 y.o. year old male who was seen today regarding   Chief Complaint   Patient presents with    Extremity Pain     None    Numbness     Numbness in the toes and at times in the bottom of the feet. Symp a less while shoes are on. Symp started after covid in 2021.     Extremity Weakness     None   The symptoms are intermittent.       I have reviewed the referring provider's office note.    There is not a family history of neuromuscular conditions.     Physical Exam: General: The patient is in no apparent distress.  MSK: There is no joint effusion, deformity, instability, swelling, erythema or warmth.  AROM is full in the spine and extremities. SLR negative Neurologic: Plantar and distal leg sensory deficit to light touch, weakness in bilateral extensor hallucis longus of 4/5 , otherwise no focal sensorimotor deficit.  Reflexes 2+ and symmetric. Gait is normal.    Impression:     1. Peripheral polyneuropathy        Plan:   EMG is indicated to evaluate the above diagnosis.    EMG was done today and showed peripheral polyneuropathy of toxic metabolic type.  Distinguishing polyneuropathy from polyradiculopathy in this case was difficult but was primarily based on the abnormal plantar studies and normal proximal muscles on EMG. However, findings of unobtainable plantar mixed nerve studies must be interpreted with caution especially when bilateral.Medial plantar can be the first abnormality detected in a length dependent neuropathy.  Abnormal plantar mixed nerve studies demonstrated the only sensory abnormality on exam and sensory nerves are not affected in polyradiculopathy.  Typically, no diagnostic significance should